# Patient Record
Sex: FEMALE | Race: BLACK OR AFRICAN AMERICAN | NOT HISPANIC OR LATINO | Employment: UNEMPLOYED | RURAL
[De-identification: names, ages, dates, MRNs, and addresses within clinical notes are randomized per-mention and may not be internally consistent; named-entity substitution may affect disease eponyms.]

---

## 2017-05-15 ENCOUNTER — HISTORICAL (OUTPATIENT)
Dept: ADMINISTRATIVE | Facility: HOSPITAL | Age: 47
End: 2017-05-15

## 2017-05-16 LAB
LAB AP CLINICAL INFORMATION: NORMAL
LAB AP DIAGNOSIS - HISTORICAL: NORMAL
LAB AP GROSS PATHOLOGY - HISTORICAL: NORMAL
LAB AP SPECIMEN SUBMITTED - HISTORICAL: NORMAL

## 2017-06-02 ENCOUNTER — HISTORICAL (OUTPATIENT)
Dept: ADMINISTRATIVE | Facility: HOSPITAL | Age: 47
End: 2017-06-02

## 2020-09-28 ENCOUNTER — HISTORICAL (OUTPATIENT)
Dept: ADMINISTRATIVE | Facility: HOSPITAL | Age: 50
End: 2020-09-28

## 2020-10-22 ENCOUNTER — HISTORICAL (OUTPATIENT)
Dept: ADMINISTRATIVE | Facility: HOSPITAL | Age: 50
End: 2020-10-22

## 2020-10-22 LAB
ALBUMIN SERPL BCP-MCNC: 3.9 G/DL (ref 3.5–5)
ALBUMIN/GLOB SERPL: 1.1 {RATIO}
ALP SERPL-CCNC: 159 U/L (ref 39–100)
ALT SERPL W P-5'-P-CCNC: 14 U/L (ref 13–56)
ANION GAP SERPL CALCULATED.3IONS-SCNC: 7 MMOL/L (ref 7–16)
AST SERPL W P-5'-P-CCNC: 8 U/L (ref 15–37)
BACTERIA #/AREA URNS HPF: ABNORMAL /HPF
BILIRUB SERPL-MCNC: 0.3 MG/DL (ref 0–1.2)
BILIRUB UR QL STRIP: NEGATIVE MG/DL
BUN SERPL-MCNC: 18 MG/DL (ref 7–18)
BUN/CREAT SERPL: 14
CALCIUM SERPL-MCNC: 9 MG/DL (ref 8.5–10.1)
CHLORIDE SERPL-SCNC: 113 MMOL/L (ref 98–107)
CHOLEST SERPL-MCNC: 155 MG/DL
CHOLEST/HDLC SERPL: 2.9 {RATIO}
CLARITY UR: CLEAR
CO2 SERPL-SCNC: 28 MMOL/L (ref 21–32)
COLOR UR: YELLOW
CREAT SERPL-MCNC: 1.29 MG/DL (ref 0.5–1.02)
CREAT UR-MCNC: 73 MG/DL (ref 28–217)
GLOBULIN SER-MCNC: 3.6 G/DL (ref 2–4)
GLUCOSE SERPL-MCNC: 130 MG/DL (ref 74–106)
GLUCOSE UR STRIP-MCNC: NEGATIVE MG/DL
HDLC SERPL-MCNC: 53 MG/DL
KETONES UR STRIP-SCNC: NEGATIVE MG/DL
LDLC SERPL CALC-MCNC: 85 MG/DL
LEUKOCYTE ESTERASE UR QL STRIP: NEGATIVE LEU/UL
MICROALBUMIN UR-MCNC: 0.8 MG/DL (ref 0–2.8)
MICROALBUMIN/CREAT RATIO PNL UR: <30 MG/G (ref 0–30)
NITRITE UR QL STRIP: NEGATIVE
PH UR STRIP: 6 PH UNITS (ref 5–8)
POTASSIUM SERPL-SCNC: 4.1 MMOL/L (ref 3.5–5.1)
PROT SERPL-MCNC: 7.5 G/DL (ref 6.4–8.2)
PROT UR QL STRIP: NEGATIVE MG/DL
RBC # UR STRIP: NEGATIVE ERY/UL
RBC #/AREA URNS HPF: ABNORMAL /HPF (ref 0–3)
SODIUM SERPL-SCNC: 144 MMOL/L (ref 136–145)
SP GR UR STRIP: 1.01 (ref 1–1.03)
SQUAMOUS #/AREA URNS LPF: ABNORMAL /LPF
TRIGL SERPL-MCNC: 83 MG/DL
UROBILINOGEN UR STRIP-ACNC: 0.2 EU/DL
WBC #/AREA URNS HPF: ABNORMAL /HPF (ref 0–5)
YEAST #/AREA URNS HPF: ABNORMAL /HPF

## 2021-04-21 RX ORDER — INSULIN ASPART 100 [IU]/ML
INJECTION, SOLUTION INTRAVENOUS; SUBCUTANEOUS
COMMUNITY
End: 2021-04-22 | Stop reason: SDUPTHER

## 2021-04-21 RX ORDER — FLUTICASONE PROPIONATE 50 MCG
2 SPRAY, SUSPENSION (ML) NASAL DAILY
COMMUNITY

## 2021-04-21 RX ORDER — AMLODIPINE AND BENAZEPRIL HYDROCHLORIDE 5; 20 MG/1; MG/1
1 CAPSULE ORAL DAILY
COMMUNITY
End: 2021-07-22

## 2021-04-21 RX ORDER — PANTOPRAZOLE SODIUM 40 MG/1
80 TABLET, DELAYED RELEASE ORAL DAILY
COMMUNITY
End: 2022-11-01 | Stop reason: SDUPTHER

## 2021-04-21 RX ORDER — IBUPROFEN 200 MG
CAPSULE ORAL
COMMUNITY
End: 2021-07-22

## 2021-04-21 RX ORDER — PROMETHAZINE HYDROCHLORIDE 25 MG/1
25 TABLET ORAL EVERY 4 HOURS
COMMUNITY
End: 2023-01-25 | Stop reason: SDUPTHER

## 2021-04-21 RX ORDER — GALCANEZUMAB 120 MG/ML
120 INJECTION, SOLUTION SUBCUTANEOUS
COMMUNITY
End: 2021-05-17 | Stop reason: SDUPTHER

## 2021-04-21 RX ORDER — DIVALPROEX SODIUM 250 MG/1
500 TABLET, FILM COATED, EXTENDED RELEASE ORAL NIGHTLY
COMMUNITY
End: 2021-05-17

## 2021-04-21 RX ORDER — RIZATRIPTAN BENZOATE 10 MG/1
10 TABLET ORAL
COMMUNITY
End: 2021-05-17

## 2021-04-21 RX ORDER — HYDROXYZINE PAMOATE 25 MG/1
25 CAPSULE ORAL 3 TIMES DAILY PRN
COMMUNITY
End: 2023-02-21

## 2021-04-21 RX ORDER — INSULIN GLARGINE 100 [IU]/ML
65 INJECTION, SOLUTION SUBCUTANEOUS DAILY
COMMUNITY
End: 2021-04-22 | Stop reason: SDUPTHER

## 2021-04-21 RX ORDER — BLOOD SUGAR DIAGNOSTIC
STRIP MISCELLANEOUS
COMMUNITY
End: 2021-04-22 | Stop reason: SDUPTHER

## 2021-04-21 RX ORDER — GLUCAGON 1 MG
KIT INJECTION
COMMUNITY
End: 2023-06-13 | Stop reason: SDUPTHER

## 2021-04-21 RX ORDER — PRAVASTATIN SODIUM 40 MG/1
40 TABLET ORAL NIGHTLY
COMMUNITY
End: 2022-04-28 | Stop reason: SDUPTHER

## 2021-04-21 RX ORDER — ZINC GLUCONATE 13.3 MG
200 LOZENGE ORAL DAILY PRN
COMMUNITY
End: 2022-01-25

## 2021-04-21 RX ORDER — METOPROLOL SUCCINATE 25 MG/1
25 TABLET, EXTENDED RELEASE ORAL DAILY
COMMUNITY
End: 2022-05-16 | Stop reason: SDUPTHER

## 2021-04-22 ENCOUNTER — LAB VISIT (OUTPATIENT)
Dept: LAB | Facility: CLINIC | Age: 51
End: 2021-04-22
Payer: OTHER GOVERNMENT

## 2021-04-22 ENCOUNTER — OFFICE VISIT (OUTPATIENT)
Dept: DIABETES SERVICES | Facility: CLINIC | Age: 51
End: 2021-04-22
Payer: OTHER GOVERNMENT

## 2021-04-22 VITALS
RESPIRATION RATE: 16 BRPM | OXYGEN SATURATION: 98 % | WEIGHT: 207.19 LBS | BODY MASS INDEX: 47.95 KG/M2 | HEART RATE: 69 BPM | DIASTOLIC BLOOD PRESSURE: 68 MMHG | HEIGHT: 55 IN | SYSTOLIC BLOOD PRESSURE: 112 MMHG

## 2021-04-22 DIAGNOSIS — G43.009 MIGRAINE WITHOUT AURA AND WITHOUT STATUS MIGRAINOSUS, NOT INTRACTABLE: ICD-10-CM

## 2021-04-22 DIAGNOSIS — E10.65 TYPE 1 DIABETES MELLITUS WITH HYPERGLYCEMIA: ICD-10-CM

## 2021-04-22 DIAGNOSIS — E10.65 TYPE 1 DIABETES MELLITUS WITH HYPERGLYCEMIA: Primary | ICD-10-CM

## 2021-04-22 DIAGNOSIS — I10 HYPERTENSION, UNSPECIFIED TYPE: ICD-10-CM

## 2021-04-22 LAB
ALBUMIN SERPL BCP-MCNC: 3.6 G/DL (ref 3.5–5)
ALBUMIN/GLOB SERPL: 1 {RATIO}
ALP SERPL-CCNC: 155 U/L (ref 39–100)
ALT SERPL W P-5'-P-CCNC: 18 U/L (ref 13–56)
ANION GAP SERPL CALCULATED.3IONS-SCNC: 9 MMOL/L (ref 7–16)
AST SERPL W P-5'-P-CCNC: 9 U/L (ref 15–37)
BILIRUB SERPL-MCNC: 0.3 MG/DL (ref 0–1.2)
BUN SERPL-MCNC: 22 MG/DL (ref 7–18)
BUN/CREAT SERPL: 14 (ref 6–20)
CALCIUM SERPL-MCNC: 9 MG/DL (ref 8.5–10.1)
CHLORIDE SERPL-SCNC: 107 MMOL/L (ref 98–107)
CHOLEST SERPL-MCNC: 170 MG/DL (ref 0–200)
CHOLEST/HDLC SERPL: 4 {RATIO}
CO2 SERPL-SCNC: 28 MMOL/L (ref 21–32)
CREAT SERPL-MCNC: 1.57 MG/DL (ref 0.55–1.02)
GLOBULIN SER-MCNC: 3.7 G/DL (ref 2–4)
GLUCOSE SERPL-MCNC: 197 MG/DL (ref 74–106)
GLUCOSE SERPL-MCNC: 207 MG/DL (ref 70–110)
HBA1C MFR BLD: 7.9 %
HDLC SERPL-MCNC: 43 MG/DL (ref 40–60)
LDLC SERPL CALC-MCNC: 96 MG/DL
LDLC/HDLC SERPL: 2.2 {RATIO}
NONHDLC SERPL-MCNC: 127 MG/DL
POTASSIUM SERPL-SCNC: 4.9 MMOL/L (ref 3.5–5.1)
PROT SERPL-MCNC: 7.3 G/DL (ref 6.4–8.2)
SODIUM SERPL-SCNC: 139 MMOL/L (ref 136–145)
TRIGL SERPL-MCNC: 157 MG/DL (ref 35–150)
VLDLC SERPL-MCNC: 31 MG/DL

## 2021-04-22 PROCEDURE — 80053 COMPREHEN METABOLIC PANEL: CPT | Mod: ,,, | Performed by: CLINICAL MEDICAL LABORATORY

## 2021-04-22 PROCEDURE — 99999 PR PBB SHADOW E&M-EST. PATIENT-LVL V: ICD-10-PCS | Mod: PBBFAC,,, | Performed by: NURSE PRACTITIONER

## 2021-04-22 PROCEDURE — 99214 OFFICE O/P EST MOD 30 MIN: CPT | Mod: S$PBB,,, | Performed by: NURSE PRACTITIONER

## 2021-04-22 PROCEDURE — 82962 GLUCOSE BLOOD TEST: CPT | Mod: PBBFAC | Performed by: NURSE PRACTITIONER

## 2021-04-22 PROCEDURE — 83036 HEMOGLOBIN GLYCOSYLATED A1C: CPT | Mod: PBBFAC | Performed by: NURSE PRACTITIONER

## 2021-04-22 PROCEDURE — 80053 COMPREHENSIVE METABOLIC PANEL: ICD-10-PCS | Mod: ,,, | Performed by: CLINICAL MEDICAL LABORATORY

## 2021-04-22 PROCEDURE — 99999 PR PBB SHADOW E&M-EST. PATIENT-LVL V: CPT | Mod: PBBFAC,,, | Performed by: NURSE PRACTITIONER

## 2021-04-22 PROCEDURE — 99214 PR OFFICE/OUTPT VISIT, EST, LEVL IV, 30-39 MIN: ICD-10-PCS | Mod: S$PBB,,, | Performed by: NURSE PRACTITIONER

## 2021-04-22 PROCEDURE — 80061 LIPID PANEL: CPT | Mod: ,,, | Performed by: CLINICAL MEDICAL LABORATORY

## 2021-04-22 PROCEDURE — 36415 PR COLLECTION VENOUS BLOOD,VENIPUNCTURE: ICD-10-PCS | Mod: ,,, | Performed by: CLINICAL MEDICAL LABORATORY

## 2021-04-22 PROCEDURE — 36415 COLL VENOUS BLD VENIPUNCTURE: CPT

## 2021-04-22 PROCEDURE — 80061 LIPID PANEL: ICD-10-PCS | Mod: ,,, | Performed by: CLINICAL MEDICAL LABORATORY

## 2021-04-22 PROCEDURE — 36415 COLL VENOUS BLD VENIPUNCTURE: CPT | Mod: ,,, | Performed by: CLINICAL MEDICAL LABORATORY

## 2021-04-22 PROCEDURE — 99215 OFFICE O/P EST HI 40 MIN: CPT | Mod: PBBFAC | Performed by: NURSE PRACTITIONER

## 2021-04-22 RX ORDER — ACETAMINOPHEN 500 MG
5000 TABLET ORAL DAILY
COMMUNITY

## 2021-04-22 RX ORDER — BLOOD SUGAR DIAGNOSTIC
STRIP MISCELLANEOUS
Qty: 500 EACH | Refills: 3 | Status: SHIPPED | OUTPATIENT
Start: 2021-04-22 | End: 2021-07-22

## 2021-04-22 RX ORDER — BLOOD-GLUCOSE CONTROL, NORMAL
EACH MISCELLANEOUS
Qty: 40 EACH | Refills: 3 | Status: SHIPPED | OUTPATIENT
Start: 2021-04-22 | End: 2021-07-22 | Stop reason: SDUPTHER

## 2021-04-22 RX ORDER — MAGNESIUM GLUCONATE 27 MG(500)
27 TABLET ORAL NIGHTLY
COMMUNITY
End: 2022-08-10

## 2021-04-22 RX ORDER — METOCLOPRAMIDE 10 MG/1
10 TABLET ORAL EVERY 6 HOURS PRN
COMMUNITY
End: 2023-01-25

## 2021-04-22 RX ORDER — BUPROPION HYDROCHLORIDE 150 MG/1
150 TABLET ORAL DAILY
COMMUNITY
End: 2022-01-25

## 2021-04-22 RX ORDER — INSULIN ASPART 100 [IU]/ML
INJECTION, SOLUTION INTRAVENOUS; SUBCUTANEOUS
Qty: 45 ML | Refills: 1 | Status: SHIPPED | OUTPATIENT
Start: 2021-04-22 | End: 2021-07-22 | Stop reason: SDUPTHER

## 2021-04-22 RX ORDER — INSULIN GLARGINE 100 [IU]/ML
45 INJECTION, SOLUTION SUBCUTANEOUS 2 TIMES DAILY
Qty: 90 ML | Refills: 3 | Status: SHIPPED | OUTPATIENT
Start: 2021-04-22 | End: 2021-07-22 | Stop reason: SDUPTHER

## 2021-04-22 RX ORDER — AMLODIPINE AND BENAZEPRIL HYDROCHLORIDE 5; 40 MG/1; MG/1
1 CAPSULE ORAL DAILY
COMMUNITY
End: 2021-05-17 | Stop reason: SDUPTHER

## 2021-05-05 ENCOUNTER — HISTORICAL (OUTPATIENT)
Dept: ADMINISTRATIVE | Facility: HOSPITAL | Age: 51
End: 2021-05-05

## 2021-05-17 ENCOUNTER — OFFICE VISIT (OUTPATIENT)
Dept: OTOLARYNGOLOGY | Facility: CLINIC | Age: 51
End: 2021-05-17
Payer: OTHER GOVERNMENT

## 2021-05-17 ENCOUNTER — OFFICE VISIT (OUTPATIENT)
Dept: NEUROLOGY | Facility: CLINIC | Age: 51
End: 2021-05-17
Payer: OTHER GOVERNMENT

## 2021-05-17 VITALS
HEIGHT: 66 IN | SYSTOLIC BLOOD PRESSURE: 164 MMHG | BODY MASS INDEX: 33.54 KG/M2 | DIASTOLIC BLOOD PRESSURE: 60 MMHG | WEIGHT: 208.69 LBS | HEART RATE: 79 BPM | OXYGEN SATURATION: 97 %

## 2021-05-17 VITALS — HEIGHT: 55 IN | WEIGHT: 207 LBS | BODY MASS INDEX: 47.9 KG/M2

## 2021-05-17 DIAGNOSIS — R06.2 WHEEZING: Primary | ICD-10-CM

## 2021-05-17 DIAGNOSIS — J32.9 CHRONIC SINUSITIS, UNSPECIFIED LOCATION: Primary | ICD-10-CM

## 2021-05-17 DIAGNOSIS — H90.12 CONDUCTIVE HEARING LOSS OF LEFT EAR, UNSPECIFIED HEARING STATUS ON CONTRALATERAL SIDE: ICD-10-CM

## 2021-05-17 DIAGNOSIS — E55.9 VITAMIN D DEFICIENCY: ICD-10-CM

## 2021-05-17 DIAGNOSIS — G43.019 INTRACTABLE MIGRAINE WITHOUT AURA AND WITHOUT STATUS MIGRAINOSUS: Primary | ICD-10-CM

## 2021-05-17 DIAGNOSIS — H66.92 LEFT OTITIS MEDIA, UNSPECIFIED OTITIS MEDIA TYPE: ICD-10-CM

## 2021-05-17 PROBLEM — E66.9 OBESITY: Status: ACTIVE | Noted: 2021-04-13

## 2021-05-17 PROBLEM — N18.2 STAGE 2 CHRONIC KIDNEY DISEASE DUE TO TYPE 2 DIABETES MELLITUS: Status: ACTIVE | Noted: 2021-04-13

## 2021-05-17 PROBLEM — G47.20 SLEEP PATTERN DISTURBANCE: Status: ACTIVE | Noted: 2021-04-13

## 2021-05-17 PROBLEM — M26.609 TEMPOROMANDIBULAR JOINT DISORDER: Status: ACTIVE | Noted: 2021-04-13

## 2021-05-17 PROBLEM — H93.13 BILATERAL TINNITUS: Status: ACTIVE | Noted: 2021-04-13

## 2021-05-17 PROBLEM — E11.22 STAGE 2 CHRONIC KIDNEY DISEASE DUE TO TYPE 2 DIABETES MELLITUS: Status: ACTIVE | Noted: 2021-04-13

## 2021-05-17 PROBLEM — G43.709 CHRONIC MIGRAINE WITHOUT AURA: Status: ACTIVE | Noted: 2021-04-12

## 2021-05-17 PROBLEM — M54.2 NECK PAIN: Status: ACTIVE | Noted: 2021-04-13

## 2021-05-17 PROCEDURE — 99215 OFFICE O/P EST HI 40 MIN: CPT | Mod: PBBFAC | Performed by: NURSE PRACTITIONER

## 2021-05-17 PROCEDURE — 99203 PR OFFICE/OUTPT VISIT, NEW, LEVL III, 30-44 MIN: ICD-10-PCS | Mod: S$PBB,,, | Performed by: OTOLARYNGOLOGY

## 2021-05-17 PROCEDURE — 99213 OFFICE O/P EST LOW 20 MIN: CPT | Mod: S$PBB,,, | Performed by: NURSE PRACTITIONER

## 2021-05-17 PROCEDURE — 99215 OFFICE O/P EST HI 40 MIN: CPT | Mod: PBBFAC | Performed by: OTOLARYNGOLOGY

## 2021-05-17 PROCEDURE — 99213 PR OFFICE/OUTPT VISIT, EST, LEVL III, 20-29 MIN: ICD-10-PCS | Mod: S$PBB,,, | Performed by: NURSE PRACTITIONER

## 2021-05-17 PROCEDURE — 99203 OFFICE O/P NEW LOW 30 MIN: CPT | Mod: S$PBB,,, | Performed by: OTOLARYNGOLOGY

## 2021-05-17 RX ORDER — ONDANSETRON 8 MG/1
TABLET, ORALLY DISINTEGRATING ORAL
COMMUNITY
End: 2023-01-25

## 2021-05-17 RX ORDER — UBROGEPANT 100 MG/1
TABLET ORAL
COMMUNITY
Start: 2021-05-10 | End: 2023-01-25

## 2021-05-17 RX ORDER — AMLODIPINE AND BENAZEPRIL HYDROCHLORIDE 5; 40 MG/1; MG/1
CAPSULE ORAL
COMMUNITY
End: 2022-04-28 | Stop reason: SDUPTHER

## 2021-05-17 RX ORDER — TOPIRAMATE 50 MG/1
TABLET, FILM COATED ORAL
COMMUNITY
End: 2021-05-17

## 2021-05-17 RX ORDER — GALCANEZUMAB 120 MG/ML
INJECTION, SOLUTION SUBCUTANEOUS
Qty: 1 ML | Refills: 11 | Status: SHIPPED | OUTPATIENT
Start: 2021-05-17 | End: 2022-02-08

## 2021-05-17 RX ORDER — DORZOLAMIDE HYDROCHLORIDE AND TIMOLOL MALEATE 20; 5 MG/ML; MG/ML
SOLUTION/ DROPS OPHTHALMIC
COMMUNITY
Start: 2021-03-30

## 2021-05-17 RX ORDER — PEN NEEDLE, DIABETIC 31 GX5/16"
NEEDLE, DISPOSABLE MISCELLANEOUS
COMMUNITY
Start: 2021-04-30 | End: 2021-07-22

## 2021-05-17 RX ORDER — BUPROPION HYDROCHLORIDE 150 MG/1
TABLET, EXTENDED RELEASE ORAL
COMMUNITY
End: 2021-05-17 | Stop reason: SDUPTHER

## 2021-05-17 RX ORDER — ACETAMINOPHEN 500 MG
5000 TABLET ORAL
COMMUNITY
End: 2021-05-17 | Stop reason: SDUPTHER

## 2021-05-26 DIAGNOSIS — J32.9 CHRONIC SINUSITIS, UNSPECIFIED LOCATION: ICD-10-CM

## 2021-06-07 ENCOUNTER — OFFICE VISIT (OUTPATIENT)
Dept: OTOLARYNGOLOGY | Facility: CLINIC | Age: 51
End: 2021-06-07
Payer: OTHER GOVERNMENT

## 2021-06-07 ENCOUNTER — HOSPITAL ENCOUNTER (OUTPATIENT)
Dept: RADIOLOGY | Facility: HOSPITAL | Age: 51
Discharge: HOME | End: 2021-06-07
Attending: OTOLARYNGOLOGY
Payer: COMMERCIAL

## 2021-06-07 VITALS — HEIGHT: 66 IN | BODY MASS INDEX: 33.54 KG/M2 | WEIGHT: 208.69 LBS

## 2021-06-07 DIAGNOSIS — H92.02 LEFT EAR PAIN: ICD-10-CM

## 2021-06-07 DIAGNOSIS — J32.9 CHRONIC SINUSITIS, UNSPECIFIED LOCATION: ICD-10-CM

## 2021-06-07 DIAGNOSIS — H66.92 LEFT OTITIS MEDIA, UNSPECIFIED OTITIS MEDIA TYPE: Primary | ICD-10-CM

## 2021-06-07 PROCEDURE — 70486 CT MAXILLOFACIAL W/O DYE: CPT | Mod: TC

## 2021-06-07 PROCEDURE — 70486 CT SINUSES WITHOUT CONTRAST: ICD-10-PCS | Mod: 26,,, | Performed by: RADIOLOGY

## 2021-06-07 PROCEDURE — 99215 OFFICE O/P EST HI 40 MIN: CPT | Mod: PBBFAC,25 | Performed by: OTOLARYNGOLOGY

## 2021-06-07 PROCEDURE — 70486 CT MAXILLOFACIAL W/O DYE: CPT | Mod: 26,,, | Performed by: RADIOLOGY

## 2021-06-07 PROCEDURE — 99214 OFFICE O/P EST MOD 30 MIN: CPT | Mod: S$PBB,,, | Performed by: OTOLARYNGOLOGY

## 2021-06-07 PROCEDURE — 99214 PR OFFICE/OUTPT VISIT, EST, LEVL IV, 30-39 MIN: ICD-10-PCS | Mod: S$PBB,,, | Performed by: OTOLARYNGOLOGY

## 2021-06-07 RX ORDER — CLINDAMYCIN HYDROCHLORIDE 300 MG/1
300 CAPSULE ORAL 2 TIMES DAILY
Qty: 28 CAPSULE | Refills: 0 | Status: SHIPPED | OUTPATIENT
Start: 2021-06-07 | End: 2022-01-13

## 2021-06-17 DIAGNOSIS — J32.9 CHRONIC SINUSITIS, UNSPECIFIED LOCATION: Primary | ICD-10-CM

## 2021-06-17 RX ORDER — SULFAMETHOXAZOLE AND TRIMETHOPRIM 800; 160 MG/1; MG/1
1 TABLET ORAL 2 TIMES DAILY
Qty: 20 TABLET | Refills: 0 | Status: SHIPPED | OUTPATIENT
Start: 2021-06-17 | End: 2022-01-13 | Stop reason: ALTCHOICE

## 2021-06-22 ENCOUNTER — OFFICE VISIT (OUTPATIENT)
Dept: OTOLARYNGOLOGY | Facility: CLINIC | Age: 51
End: 2021-06-22
Payer: OTHER GOVERNMENT

## 2021-06-22 ENCOUNTER — OFFICE VISIT (OUTPATIENT)
Dept: PULMONOLOGY | Facility: CLINIC | Age: 51
End: 2021-06-22
Payer: COMMERCIAL

## 2021-06-22 VITALS
HEART RATE: 77 BPM | SYSTOLIC BLOOD PRESSURE: 122 MMHG | RESPIRATION RATE: 18 BRPM | BODY MASS INDEX: 34.23 KG/M2 | WEIGHT: 213 LBS | OXYGEN SATURATION: 98 % | DIASTOLIC BLOOD PRESSURE: 62 MMHG | HEIGHT: 66 IN

## 2021-06-22 VITALS — HEIGHT: 66 IN | WEIGHT: 213 LBS | BODY MASS INDEX: 34.23 KG/M2

## 2021-06-22 DIAGNOSIS — R06.2 WHEEZING: Primary | ICD-10-CM

## 2021-06-22 DIAGNOSIS — H92.02 LEFT EAR PAIN: ICD-10-CM

## 2021-06-22 DIAGNOSIS — H66.92 LEFT OTITIS MEDIA, UNSPECIFIED OTITIS MEDIA TYPE: Primary | ICD-10-CM

## 2021-06-22 PROCEDURE — 99213 PR OFFICE/OUTPT VISIT, EST, LEVL III, 20-29 MIN: ICD-10-PCS | Mod: S$PBB,,, | Performed by: INTERNAL MEDICINE

## 2021-06-22 PROCEDURE — 99213 OFFICE O/P EST LOW 20 MIN: CPT | Mod: PBBFAC | Performed by: OTOLARYNGOLOGY

## 2021-06-22 PROCEDURE — 99214 OFFICE O/P EST MOD 30 MIN: CPT | Mod: S$PBB,,, | Performed by: OTOLARYNGOLOGY

## 2021-06-22 PROCEDURE — 99214 PR OFFICE/OUTPT VISIT, EST, LEVL IV, 30-39 MIN: ICD-10-PCS | Mod: S$PBB,,, | Performed by: OTOLARYNGOLOGY

## 2021-06-22 PROCEDURE — 99215 OFFICE O/P EST HI 40 MIN: CPT | Mod: PBBFAC | Performed by: INTERNAL MEDICINE

## 2021-06-22 PROCEDURE — 99213 OFFICE O/P EST LOW 20 MIN: CPT | Mod: S$PBB,,, | Performed by: INTERNAL MEDICINE

## 2021-06-22 RX ORDER — ALBUTEROL SULFATE 90 UG/1
2 AEROSOL, METERED RESPIRATORY (INHALATION) EVERY 6 HOURS PRN
Qty: 18 G | Refills: 0 | Status: SHIPPED | OUTPATIENT
Start: 2021-06-22 | End: 2021-09-07 | Stop reason: SDUPTHER

## 2021-07-14 ENCOUNTER — OFFICE VISIT (OUTPATIENT)
Dept: OTOLARYNGOLOGY | Facility: CLINIC | Age: 51
End: 2021-07-14
Payer: OTHER GOVERNMENT

## 2021-07-14 VITALS — WEIGHT: 213 LBS | BODY MASS INDEX: 34.23 KG/M2 | HEIGHT: 66 IN

## 2021-07-14 DIAGNOSIS — H66.90 OTITIS MEDIA, UNSPECIFIED LATERALITY, UNSPECIFIED OTITIS MEDIA TYPE: Primary | ICD-10-CM

## 2021-07-14 PROCEDURE — 99214 OFFICE O/P EST MOD 30 MIN: CPT | Mod: S$PBB,,, | Performed by: OTOLARYNGOLOGY

## 2021-07-14 PROCEDURE — 99215 OFFICE O/P EST HI 40 MIN: CPT | Mod: PBBFAC | Performed by: OTOLARYNGOLOGY

## 2021-07-14 PROCEDURE — 99214 PR OFFICE/OUTPT VISIT, EST, LEVL IV, 30-39 MIN: ICD-10-PCS | Mod: S$PBB,,, | Performed by: OTOLARYNGOLOGY

## 2021-07-19 ENCOUNTER — OFFICE VISIT (OUTPATIENT)
Dept: FAMILY MEDICINE | Facility: CLINIC | Age: 51
End: 2021-07-19
Payer: OTHER GOVERNMENT

## 2021-07-19 VITALS
DIASTOLIC BLOOD PRESSURE: 70 MMHG | SYSTOLIC BLOOD PRESSURE: 127 MMHG | TEMPERATURE: 97 F | BODY MASS INDEX: 32.14 KG/M2 | OXYGEN SATURATION: 99 % | WEIGHT: 200 LBS | HEIGHT: 66 IN | HEART RATE: 71 BPM

## 2021-07-19 DIAGNOSIS — E10.9 TYPE 1 DIABETES MELLITUS WITHOUT COMPLICATION: Primary | ICD-10-CM

## 2021-07-19 DIAGNOSIS — E01.0 THYROMEGALY: ICD-10-CM

## 2021-07-19 LAB
GLUCOSE SERPL-MCNC: 235 MG/DL (ref 70–110)
T4 FREE SERPL-MCNC: 1.11 NG/DL (ref 0.76–1.46)
TSH SERPL DL<=0.005 MIU/L-ACNC: 2.26 UIU/ML (ref 0.36–3.74)

## 2021-07-19 PROCEDURE — 84443 ASSAY THYROID STIM HORMONE: CPT | Mod: ,,, | Performed by: CLINICAL MEDICAL LABORATORY

## 2021-07-19 PROCEDURE — 84439 ASSAY OF FREE THYROXINE: CPT | Mod: ,,, | Performed by: CLINICAL MEDICAL LABORATORY

## 2021-07-19 PROCEDURE — 84439 T4, FREE: ICD-10-PCS | Mod: ,,, | Performed by: CLINICAL MEDICAL LABORATORY

## 2021-07-19 PROCEDURE — 99213 PR OFFICE/OUTPT VISIT, EST, LEVL III, 20-29 MIN: ICD-10-PCS | Mod: ,,, | Performed by: NURSE PRACTITIONER

## 2021-07-19 PROCEDURE — 82962 POCT GLUCOSE, HAND-HELD DEVICE: ICD-10-PCS | Mod: QW,,, | Performed by: NURSE PRACTITIONER

## 2021-07-19 PROCEDURE — 99213 OFFICE O/P EST LOW 20 MIN: CPT | Mod: ,,, | Performed by: NURSE PRACTITIONER

## 2021-07-19 PROCEDURE — 84443 TSH: ICD-10-PCS | Mod: ,,, | Performed by: CLINICAL MEDICAL LABORATORY

## 2021-07-19 PROCEDURE — 82962 GLUCOSE BLOOD TEST: CPT | Mod: QW,,, | Performed by: NURSE PRACTITIONER

## 2021-07-20 ENCOUNTER — TELEPHONE (OUTPATIENT)
Dept: FAMILY MEDICINE | Facility: CLINIC | Age: 51
End: 2021-07-20

## 2021-07-22 ENCOUNTER — PATIENT MESSAGE (OUTPATIENT)
Dept: DIABETES SERVICES | Facility: CLINIC | Age: 51
End: 2021-07-22

## 2021-07-22 ENCOUNTER — OFFICE VISIT (OUTPATIENT)
Dept: DIABETES SERVICES | Facility: CLINIC | Age: 51
End: 2021-07-22
Payer: OTHER GOVERNMENT

## 2021-07-22 VITALS
SYSTOLIC BLOOD PRESSURE: 122 MMHG | HEART RATE: 73 BPM | OXYGEN SATURATION: 98 % | RESPIRATION RATE: 16 BRPM | DIASTOLIC BLOOD PRESSURE: 78 MMHG | WEIGHT: 210.38 LBS | BODY MASS INDEX: 33.81 KG/M2 | HEIGHT: 66 IN

## 2021-07-22 DIAGNOSIS — E11.22 STAGE 2 CHRONIC KIDNEY DISEASE DUE TO TYPE 2 DIABETES MELLITUS: ICD-10-CM

## 2021-07-22 DIAGNOSIS — E10.9 TYPE 1 DIABETES MELLITUS WITHOUT COMPLICATION: Primary | ICD-10-CM

## 2021-07-22 DIAGNOSIS — E66.9 OBESITY, UNSPECIFIED CLASSIFICATION, UNSPECIFIED OBESITY TYPE, UNSPECIFIED WHETHER SERIOUS COMORBIDITY PRESENT: ICD-10-CM

## 2021-07-22 DIAGNOSIS — E10.65 TYPE 1 DIABETES MELLITUS WITH HYPERGLYCEMIA: ICD-10-CM

## 2021-07-22 DIAGNOSIS — N18.2 STAGE 2 CHRONIC KIDNEY DISEASE DUE TO TYPE 2 DIABETES MELLITUS: ICD-10-CM

## 2021-07-22 DIAGNOSIS — G43.709 CHRONIC MIGRAINE WITHOUT AURA WITHOUT STATUS MIGRAINOSUS, NOT INTRACTABLE: ICD-10-CM

## 2021-07-22 DIAGNOSIS — I10 HYPERTENSION, UNSPECIFIED TYPE: ICD-10-CM

## 2021-07-22 LAB
GLUCOSE SERPL-MCNC: 161 MG/DL (ref 70–110)
HBA1C MFR BLD: 8 % (ref 4.5–6.6)

## 2021-07-22 PROCEDURE — 99215 OFFICE O/P EST HI 40 MIN: CPT | Mod: PBBFAC | Performed by: NURSE PRACTITIONER

## 2021-07-22 PROCEDURE — 99214 OFFICE O/P EST MOD 30 MIN: CPT | Mod: S$PBB,,, | Performed by: NURSE PRACTITIONER

## 2021-07-22 PROCEDURE — 83036 HEMOGLOBIN GLYCOSYLATED A1C: CPT | Mod: PBBFAC | Performed by: NURSE PRACTITIONER

## 2021-07-22 PROCEDURE — 99214 PR OFFICE/OUTPT VISIT, EST, LEVL IV, 30-39 MIN: ICD-10-PCS | Mod: S$PBB,,, | Performed by: NURSE PRACTITIONER

## 2021-07-22 PROCEDURE — 82962 GLUCOSE BLOOD TEST: CPT | Mod: PBBFAC | Performed by: NURSE PRACTITIONER

## 2021-07-22 RX ORDER — FLASH GLUCOSE SENSOR
KIT MISCELLANEOUS
Qty: 6 KIT | Refills: 1 | Status: SHIPPED | OUTPATIENT
Start: 2021-07-22 | End: 2021-11-01 | Stop reason: SDUPTHER

## 2021-07-22 RX ORDER — PEN NEEDLE, DIABETIC 31 GX5/16"
NEEDLE, DISPOSABLE MISCELLANEOUS
Qty: 100 EACH | Refills: 11 | Status: SHIPPED | OUTPATIENT
Start: 2021-07-22 | End: 2022-04-28 | Stop reason: SDUPTHER

## 2021-07-22 RX ORDER — FLASH GLUCOSE SCANNING READER
EACH MISCELLANEOUS
Qty: 1 EACH | Refills: 0 | Status: SHIPPED | OUTPATIENT
Start: 2021-07-22 | End: 2022-01-25 | Stop reason: SDUPTHER

## 2021-07-22 RX ORDER — INSULIN ASPART 100 [IU]/ML
INJECTION, SOLUTION INTRAVENOUS; SUBCUTANEOUS
Qty: 45 ML | Refills: 1 | Status: SHIPPED | OUTPATIENT
Start: 2021-07-22 | End: 2021-11-15 | Stop reason: SDUPTHER

## 2021-07-22 RX ORDER — BLOOD-GLUCOSE CONTROL, NORMAL
EACH MISCELLANEOUS
Qty: 40 EACH | Refills: 3 | Status: SHIPPED | OUTPATIENT
Start: 2021-07-22 | End: 2022-02-08

## 2021-07-22 RX ORDER — INSULIN GLARGINE 100 [IU]/ML
45 INJECTION, SOLUTION SUBCUTANEOUS 2 TIMES DAILY
Qty: 90 ML | Refills: 3 | Status: SHIPPED | OUTPATIENT
Start: 2021-07-22 | End: 2021-11-01 | Stop reason: SDUPTHER

## 2021-07-26 ENCOUNTER — OFFICE VISIT (OUTPATIENT)
Dept: FAMILY MEDICINE | Facility: CLINIC | Age: 51
End: 2021-07-26
Payer: OTHER GOVERNMENT

## 2021-07-26 DIAGNOSIS — Z01.812 ENCOUNTER FOR PREOPERATIVE SCREENING LABORATORY TESTING FOR COVID-19 VIRUS: Primary | ICD-10-CM

## 2021-07-26 DIAGNOSIS — Z11.52 ENCOUNTER FOR PREOPERATIVE SCREENING LABORATORY TESTING FOR COVID-19 VIRUS: Primary | ICD-10-CM

## 2021-07-26 LAB
CTP QC/QA: YES
SARS-COV-2 AG RESP QL IA.RAPID: NEGATIVE

## 2021-07-26 PROCEDURE — 87426 SARSCOV CORONAVIRUS AG IA: CPT | Mod: QW,,, | Performed by: NURSE PRACTITIONER

## 2021-07-26 PROCEDURE — 87426 SARS CORONAVIRUS 2 ANTIGEN POCT: ICD-10-PCS | Mod: QW,,, | Performed by: NURSE PRACTITIONER

## 2021-07-27 ENCOUNTER — ANESTHESIA EVENT (OUTPATIENT)
Dept: SURGERY | Facility: HOSPITAL | Age: 51
End: 2021-07-27
Payer: COMMERCIAL

## 2021-07-27 ENCOUNTER — HOSPITAL ENCOUNTER (OUTPATIENT)
Facility: HOSPITAL | Age: 51
Discharge: HOME | End: 2021-07-27
Attending: OTOLARYNGOLOGY | Admitting: OTOLARYNGOLOGY
Payer: OTHER GOVERNMENT

## 2021-07-27 ENCOUNTER — ANESTHESIA (OUTPATIENT)
Dept: SURGERY | Facility: HOSPITAL | Age: 51
End: 2021-07-27
Payer: COMMERCIAL

## 2021-07-27 VITALS
DIASTOLIC BLOOD PRESSURE: 71 MMHG | HEIGHT: 66 IN | TEMPERATURE: 98 F | WEIGHT: 213 LBS | OXYGEN SATURATION: 97 % | SYSTOLIC BLOOD PRESSURE: 154 MMHG | BODY MASS INDEX: 34.23 KG/M2 | HEART RATE: 73 BPM | RESPIRATION RATE: 16 BRPM

## 2021-07-27 DIAGNOSIS — H65.23 CHRONIC SEROUS OTITIS MEDIA OF BOTH EARS: Primary | ICD-10-CM

## 2021-07-27 LAB
GLUCOSE SERPL-MCNC: 196 MG/DL (ref 70–105)
GLUCOSE SERPL-MCNC: 236 MG/DL (ref 70–105)

## 2021-07-27 PROCEDURE — D9220A PRA ANESTHESIA: ICD-10-PCS | Mod: CRNA,,, | Performed by: NURSE ANESTHETIST, CERTIFIED REGISTERED

## 2021-07-27 PROCEDURE — 69436 PR CREATE EARDRUM OPENING,GEN ANESTH: ICD-10-PCS | Mod: 50,,, | Performed by: OTOLARYNGOLOGY

## 2021-07-27 PROCEDURE — 71000033 HC RECOVERY, INTIAL HOUR: Performed by: OTOLARYNGOLOGY

## 2021-07-27 PROCEDURE — 36000704 HC OR TIME LEV I 1ST 15 MIN: Performed by: OTOLARYNGOLOGY

## 2021-07-27 PROCEDURE — D9220A PRA ANESTHESIA: Mod: ANES,,, | Performed by: ANESTHESIOLOGY

## 2021-07-27 PROCEDURE — 27201423 OPTIME MED/SURG SUP & DEVICES STERILE SUPPLY: Performed by: OTOLARYNGOLOGY

## 2021-07-27 PROCEDURE — 82962 GLUCOSE BLOOD TEST: CPT

## 2021-07-27 PROCEDURE — 36000705 HC OR TIME LEV I EA ADD 15 MIN: Performed by: OTOLARYNGOLOGY

## 2021-07-27 PROCEDURE — 27800903 OPTIME MED/SURG SUP & DEVICES OTHER IMPLANTS: Performed by: OTOLARYNGOLOGY

## 2021-07-27 PROCEDURE — 37000008 HC ANESTHESIA 1ST 15 MINUTES: Performed by: OTOLARYNGOLOGY

## 2021-07-27 PROCEDURE — 63600175 PHARM REV CODE 636 W HCPCS: Performed by: NURSE ANESTHETIST, CERTIFIED REGISTERED

## 2021-07-27 PROCEDURE — 25000003 PHARM REV CODE 250: Performed by: NURSE ANESTHETIST, CERTIFIED REGISTERED

## 2021-07-27 PROCEDURE — 37000009 HC ANESTHESIA EA ADD 15 MINS: Performed by: OTOLARYNGOLOGY

## 2021-07-27 PROCEDURE — 25000003 PHARM REV CODE 250

## 2021-07-27 PROCEDURE — 71000015 HC POSTOP RECOV 1ST HR: Performed by: OTOLARYNGOLOGY

## 2021-07-27 PROCEDURE — 69436 CREATE EARDRUM OPENING: CPT | Mod: 50,,, | Performed by: OTOLARYNGOLOGY

## 2021-07-27 PROCEDURE — 71000016 HC POSTOP RECOV ADDL HR: Performed by: OTOLARYNGOLOGY

## 2021-07-27 PROCEDURE — D9220A PRA ANESTHESIA: ICD-10-PCS | Mod: ANES,,, | Performed by: ANESTHESIOLOGY

## 2021-07-27 PROCEDURE — 63600175 PHARM REV CODE 636 W HCPCS: Performed by: ANESTHESIOLOGY

## 2021-07-27 PROCEDURE — D9220A PRA ANESTHESIA: Mod: CRNA,,, | Performed by: NURSE ANESTHETIST, CERTIFIED REGISTERED

## 2021-07-27 PROCEDURE — 25000003 PHARM REV CODE 250: Performed by: OTOLARYNGOLOGY

## 2021-07-27 RX ORDER — MORPHINE SULFATE 10 MG/ML
4 INJECTION INTRAMUSCULAR; INTRAVENOUS; SUBCUTANEOUS EVERY 5 MIN PRN
Status: DISCONTINUED | OUTPATIENT
Start: 2021-07-27 | End: 2021-07-27 | Stop reason: HOSPADM

## 2021-07-27 RX ORDER — DIPHENHYDRAMINE HYDROCHLORIDE 50 MG/ML
25 INJECTION INTRAMUSCULAR; INTRAVENOUS EVERY 6 HOURS PRN
Status: DISCONTINUED | OUTPATIENT
Start: 2021-07-27 | End: 2021-07-27 | Stop reason: HOSPADM

## 2021-07-27 RX ORDER — HYDROMORPHONE HYDROCHLORIDE 2 MG/ML
0.5 INJECTION, SOLUTION INTRAMUSCULAR; INTRAVENOUS; SUBCUTANEOUS EVERY 5 MIN PRN
Status: DISCONTINUED | OUTPATIENT
Start: 2021-07-27 | End: 2021-07-27 | Stop reason: HOSPADM

## 2021-07-27 RX ORDER — MEPERIDINE HYDROCHLORIDE 25 MG/ML
25 INJECTION INTRAMUSCULAR; INTRAVENOUS; SUBCUTANEOUS EVERY 10 MIN PRN
Status: DISCONTINUED | OUTPATIENT
Start: 2021-07-27 | End: 2021-07-27 | Stop reason: HOSPADM

## 2021-07-27 RX ORDER — PROPOFOL 10 MG/ML
VIAL (ML) INTRAVENOUS
Status: DISCONTINUED | OUTPATIENT
Start: 2021-07-27 | End: 2021-07-27

## 2021-07-27 RX ORDER — ONDANSETRON 2 MG/ML
INJECTION INTRAMUSCULAR; INTRAVENOUS
Status: DISCONTINUED | OUTPATIENT
Start: 2021-07-27 | End: 2021-07-27

## 2021-07-27 RX ORDER — ONDANSETRON 2 MG/ML
4 INJECTION INTRAMUSCULAR; INTRAVENOUS DAILY PRN
Status: DISCONTINUED | OUTPATIENT
Start: 2021-07-27 | End: 2021-07-27 | Stop reason: HOSPADM

## 2021-07-27 RX ORDER — FENTANYL CITRATE 50 UG/ML
INJECTION, SOLUTION INTRAMUSCULAR; INTRAVENOUS
Status: DISCONTINUED | OUTPATIENT
Start: 2021-07-27 | End: 2021-07-27

## 2021-07-27 RX ORDER — CIPROFLOXACIN AND DEXAMETHASONE 3; 1 MG/ML; MG/ML
SUSPENSION/ DROPS AURICULAR (OTIC)
Status: DISCONTINUED | OUTPATIENT
Start: 2021-07-27 | End: 2021-07-27 | Stop reason: HOSPADM

## 2021-07-27 RX ORDER — SODIUM CHLORIDE 0.9 % (FLUSH) 0.9 %
10 SYRINGE (ML) INJECTION
Status: DISCONTINUED | OUTPATIENT
Start: 2021-07-27 | End: 2021-07-27 | Stop reason: HOSPADM

## 2021-07-27 RX ORDER — LIDOCAINE HYDROCHLORIDE 20 MG/ML
INJECTION, SOLUTION EPIDURAL; INFILTRATION; INTRACAUDAL; PERINEURAL
Status: DISCONTINUED | OUTPATIENT
Start: 2021-07-27 | End: 2021-07-27

## 2021-07-27 RX ORDER — LIDOCAINE HYDROCHLORIDE 10 MG/ML
1 INJECTION, SOLUTION EPIDURAL; INFILTRATION; INTRACAUDAL; PERINEURAL ONCE
Status: DISCONTINUED | OUTPATIENT
Start: 2021-07-27 | End: 2021-07-27 | Stop reason: HOSPADM

## 2021-07-27 RX ORDER — SODIUM CHLORIDE, SODIUM LACTATE, POTASSIUM CHLORIDE, CALCIUM CHLORIDE 600; 310; 30; 20 MG/100ML; MG/100ML; MG/100ML; MG/100ML
INJECTION, SOLUTION INTRAVENOUS CONTINUOUS
Status: DISCONTINUED | OUTPATIENT
Start: 2021-07-27 | End: 2021-07-27 | Stop reason: HOSPADM

## 2021-07-27 RX ADMIN — ONDANSETRON 4 MG: 2 INJECTION INTRAMUSCULAR; INTRAVENOUS at 09:07

## 2021-07-27 RX ADMIN — SODIUM CHLORIDE: 9 INJECTION, SOLUTION INTRAVENOUS at 09:07

## 2021-07-27 RX ADMIN — HUMAN INSULIN 3 UNITS: 100 INJECTION, SOLUTION SUBCUTANEOUS at 08:07

## 2021-07-27 RX ADMIN — MORPHINE SULFATE 4 MG: 10 INJECTION INTRAVENOUS at 09:07

## 2021-07-27 RX ADMIN — FENTANYL CITRATE 100 MCG: 50 INJECTION INTRAMUSCULAR; INTRAVENOUS at 09:07

## 2021-07-27 RX ADMIN — LIDOCAINE HYDROCHLORIDE 80 MG: 20 INJECTION, SOLUTION INTRAVENOUS at 09:07

## 2021-07-27 RX ADMIN — PROPOFOL 100 MG: 10 INJECTION, EMULSION INTRAVENOUS at 09:07

## 2021-07-27 RX ADMIN — MORPHINE SULFATE 2 MG: 10 INJECTION INTRAVENOUS at 10:07

## 2021-08-05 ENCOUNTER — OFFICE VISIT (OUTPATIENT)
Dept: OTOLARYNGOLOGY | Facility: CLINIC | Age: 51
End: 2021-08-05
Payer: COMMERCIAL

## 2021-08-05 VITALS — HEIGHT: 66 IN | WEIGHT: 213 LBS | BODY MASS INDEX: 34.23 KG/M2

## 2021-08-05 DIAGNOSIS — H65.23 CHRONIC SEROUS OTITIS MEDIA OF BOTH EARS: Primary | ICD-10-CM

## 2021-08-05 PROCEDURE — 99024 POSTOP FOLLOW-UP VISIT: CPT | Mod: ,,, | Performed by: OTOLARYNGOLOGY

## 2021-08-05 PROCEDURE — 99024 PR POST-OP FOLLOW-UP VISIT: ICD-10-PCS | Mod: ,,, | Performed by: OTOLARYNGOLOGY

## 2021-08-05 PROCEDURE — 99213 OFFICE O/P EST LOW 20 MIN: CPT | Mod: PBBFAC | Performed by: OTOLARYNGOLOGY

## 2021-08-18 ENCOUNTER — OFFICE VISIT (OUTPATIENT)
Dept: FAMILY MEDICINE | Facility: CLINIC | Age: 51
End: 2021-08-18
Payer: OTHER GOVERNMENT

## 2021-08-18 DIAGNOSIS — Z20.828 EXPOSURE TO SARS-ASSOCIATED CORONAVIRUS: Primary | ICD-10-CM

## 2021-08-18 LAB
CTP QC/QA: YES
FLUAV AG NPH QL: NEGATIVE
FLUBV AG NPH QL: NEGATIVE
SARS-COV-2 AG RESP QL IA.RAPID: NEGATIVE

## 2021-08-18 PROCEDURE — 87428 POCT SARS-COV2 (COVID) WITH FLU ANTIGEN: ICD-10-PCS | Mod: QW,CR,GC, | Performed by: FAMILY MEDICINE

## 2021-08-18 PROCEDURE — 87428 SARSCOV & INF VIR A&B AG IA: CPT | Mod: QW,CR,GC, | Performed by: FAMILY MEDICINE

## 2021-08-18 PROCEDURE — 99212 OFFICE O/P EST SF 10 MIN: CPT | Mod: GC,,, | Performed by: FAMILY MEDICINE

## 2021-08-18 PROCEDURE — 99212 PR OFFICE/OUTPT VISIT, EST, LEVL II, 10-19 MIN: ICD-10-PCS | Mod: GC,,, | Performed by: FAMILY MEDICINE

## 2021-09-02 ENCOUNTER — TELEPHONE (OUTPATIENT)
Dept: OTOLARYNGOLOGY | Facility: CLINIC | Age: 51
End: 2021-09-02

## 2021-09-02 DIAGNOSIS — H70.93 MASTOIDITIS OF BOTH SIDES: Primary | ICD-10-CM

## 2021-09-07 ENCOUNTER — CLINICAL SUPPORT (OUTPATIENT)
Dept: PULMONOLOGY | Facility: HOSPITAL | Age: 51
End: 2021-09-07
Attending: INTERNAL MEDICINE
Payer: OTHER GOVERNMENT

## 2021-09-07 ENCOUNTER — HOSPITAL ENCOUNTER (OUTPATIENT)
Dept: RADIOLOGY | Facility: HOSPITAL | Age: 51
Discharge: HOME | End: 2021-09-07
Attending: INTERNAL MEDICINE
Payer: OTHER GOVERNMENT

## 2021-09-07 ENCOUNTER — OFFICE VISIT (OUTPATIENT)
Dept: PULMONOLOGY | Facility: CLINIC | Age: 51
End: 2021-09-07
Payer: COMMERCIAL

## 2021-09-07 VITALS
HEIGHT: 66 IN | BODY MASS INDEX: 34.23 KG/M2 | HEART RATE: 83 BPM | DIASTOLIC BLOOD PRESSURE: 78 MMHG | WEIGHT: 213 LBS | OXYGEN SATURATION: 99 % | SYSTOLIC BLOOD PRESSURE: 132 MMHG | RESPIRATION RATE: 18 BRPM

## 2021-09-07 DIAGNOSIS — R06.2 WHEEZING: ICD-10-CM

## 2021-09-07 PROCEDURE — 94729 DIFFUSING CAPACITY: CPT

## 2021-09-07 PROCEDURE — 99213 PR OFFICE/OUTPT VISIT, EST, LEVL III, 20-29 MIN: ICD-10-PCS | Mod: S$PBB,25,, | Performed by: INTERNAL MEDICINE

## 2021-09-07 PROCEDURE — 99214 OFFICE O/P EST MOD 30 MIN: CPT | Mod: PBBFAC,25 | Performed by: INTERNAL MEDICINE

## 2021-09-07 PROCEDURE — 71046 X-RAY EXAM CHEST 2 VIEWS: CPT | Mod: TC

## 2021-09-07 PROCEDURE — 94060 EVALUATION OF WHEEZING: CPT | Mod: 26,,, | Performed by: INTERNAL MEDICINE

## 2021-09-07 PROCEDURE — 94726 PLETHYSMOGRAPHY LUNG VOLUMES: CPT

## 2021-09-07 PROCEDURE — 94060 EVALUATION OF WHEEZING: CPT | Performed by: INTERNAL MEDICINE

## 2021-09-07 PROCEDURE — 99213 OFFICE O/P EST LOW 20 MIN: CPT | Mod: S$PBB,25,, | Performed by: INTERNAL MEDICINE

## 2021-09-07 PROCEDURE — 71046 XR CHEST PA AND LATERAL: ICD-10-PCS | Mod: 26,,, | Performed by: RADIOLOGY

## 2021-09-07 PROCEDURE — 94060 EVALUATION OF WHEEZING: CPT

## 2021-09-07 PROCEDURE — 94010 BREATHING CAPACITY TEST: CPT | Mod: PBBFAC | Performed by: INTERNAL MEDICINE

## 2021-09-07 PROCEDURE — 94060 PR EVAL OF BRONCHOSPASM: ICD-10-PCS | Mod: 26,,, | Performed by: INTERNAL MEDICINE

## 2021-09-07 PROCEDURE — 71046 X-RAY EXAM CHEST 2 VIEWS: CPT | Mod: 26,,, | Performed by: RADIOLOGY

## 2021-09-07 RX ORDER — LEVOFLOXACIN 500 MG/1
500 TABLET, FILM COATED ORAL DAILY
Qty: 10 TABLET | Refills: 0 | Status: SHIPPED | OUTPATIENT
Start: 2021-09-07 | End: 2022-01-13

## 2021-09-07 RX ORDER — ALBUTEROL SULFATE 90 UG/1
2 AEROSOL, METERED RESPIRATORY (INHALATION) EVERY 6 HOURS PRN
Qty: 18 G | Refills: 3 | Status: SHIPPED | OUTPATIENT
Start: 2021-09-07 | End: 2022-01-13 | Stop reason: SDUPTHER

## 2021-10-25 ENCOUNTER — PATIENT MESSAGE (OUTPATIENT)
Dept: DIABETES SERVICES | Facility: CLINIC | Age: 51
End: 2021-10-25
Payer: OTHER GOVERNMENT

## 2021-11-01 RX ORDER — INSULIN GLARGINE 100 [IU]/ML
55 INJECTION, SOLUTION SUBCUTANEOUS 2 TIMES DAILY
Qty: 90 ML | Refills: 3 | Status: SHIPPED | OUTPATIENT
Start: 2021-11-01 | End: 2021-11-15 | Stop reason: SDUPTHER

## 2021-11-01 RX ORDER — FLASH GLUCOSE SENSOR
KIT MISCELLANEOUS
Qty: 6 KIT | Refills: 3 | Status: SHIPPED | OUTPATIENT
Start: 2021-11-01 | End: 2022-11-02 | Stop reason: SDUPTHER

## 2021-11-04 ENCOUNTER — OFFICE VISIT (OUTPATIENT)
Dept: OTOLARYNGOLOGY | Facility: CLINIC | Age: 51
End: 2021-11-04
Payer: COMMERCIAL

## 2021-11-04 DIAGNOSIS — H65.23 CHRONIC SEROUS OTITIS MEDIA OF BOTH EARS: Primary | ICD-10-CM

## 2021-11-04 DIAGNOSIS — G44.001 INTRACTABLE CLUSTER HEADACHE SYNDROME, UNSPECIFIED CHRONICITY PATTERN: ICD-10-CM

## 2021-11-04 DIAGNOSIS — J32.8 OTHER CHRONIC SINUSITIS: ICD-10-CM

## 2021-11-04 PROCEDURE — 99212 OFFICE O/P EST SF 10 MIN: CPT | Mod: PBBFAC | Performed by: OTOLARYNGOLOGY

## 2021-11-04 PROCEDURE — 99214 PR OFFICE/OUTPT VISIT, EST, LEVL IV, 30-39 MIN: ICD-10-PCS | Mod: S$PBB,,, | Performed by: OTOLARYNGOLOGY

## 2021-11-04 PROCEDURE — 99214 OFFICE O/P EST MOD 30 MIN: CPT | Mod: S$PBB,,, | Performed by: OTOLARYNGOLOGY

## 2021-11-15 ENCOUNTER — PATIENT MESSAGE (OUTPATIENT)
Dept: DIABETES SERVICES | Facility: CLINIC | Age: 51
End: 2021-11-15
Payer: OTHER GOVERNMENT

## 2021-11-15 RX ORDER — INSULIN GLARGINE 100 [IU]/ML
INJECTION, SOLUTION SUBCUTANEOUS
Qty: 90 ML | Refills: 3 | Status: SHIPPED | OUTPATIENT
Start: 2021-11-15 | End: 2022-01-25 | Stop reason: SDUPTHER

## 2021-11-15 RX ORDER — INSULIN ASPART 100 [IU]/ML
INJECTION, SOLUTION INTRAVENOUS; SUBCUTANEOUS
Qty: 45 ML | Refills: 1 | Status: SHIPPED | OUTPATIENT
Start: 2021-11-15 | End: 2022-01-25 | Stop reason: SDUPTHER

## 2021-11-17 ENCOUNTER — TELEPHONE (OUTPATIENT)
Dept: OTOLARYNGOLOGY | Facility: CLINIC | Age: 51
End: 2021-11-17
Payer: OTHER GOVERNMENT

## 2021-11-17 ENCOUNTER — PATIENT MESSAGE (OUTPATIENT)
Dept: OTOLARYNGOLOGY | Facility: CLINIC | Age: 51
End: 2021-11-17
Payer: OTHER GOVERNMENT

## 2021-11-17 DIAGNOSIS — J32.9 CHRONIC SINUSITIS, UNSPECIFIED LOCATION: Primary | ICD-10-CM

## 2021-11-17 RX ORDER — CIPROFLOXACIN 500 MG/1
500 TABLET ORAL 2 TIMES DAILY
Qty: 28 TABLET | Refills: 0 | Status: SHIPPED | OUTPATIENT
Start: 2021-11-17 | End: 2022-01-13

## 2021-11-22 ENCOUNTER — OFFICE VISIT (OUTPATIENT)
Dept: OTOLARYNGOLOGY | Facility: CLINIC | Age: 51
End: 2021-11-22
Payer: COMMERCIAL

## 2021-11-22 VITALS — HEIGHT: 66 IN | BODY MASS INDEX: 34.23 KG/M2 | WEIGHT: 213 LBS

## 2021-11-22 DIAGNOSIS — J32.9 CHRONIC SINUSITIS, UNSPECIFIED LOCATION: ICD-10-CM

## 2021-11-22 DIAGNOSIS — H65.23 CHRONIC SEROUS OTITIS MEDIA OF BOTH EARS: Primary | ICD-10-CM

## 2021-11-22 PROCEDURE — 99214 OFFICE O/P EST MOD 30 MIN: CPT | Mod: S$PBB,,, | Performed by: OTOLARYNGOLOGY

## 2021-11-22 PROCEDURE — 99214 PR OFFICE/OUTPT VISIT, EST, LEVL IV, 30-39 MIN: ICD-10-PCS | Mod: S$PBB,,, | Performed by: OTOLARYNGOLOGY

## 2021-11-22 PROCEDURE — 99215 OFFICE O/P EST HI 40 MIN: CPT | Mod: PBBFAC | Performed by: OTOLARYNGOLOGY

## 2022-01-13 ENCOUNTER — OFFICE VISIT (OUTPATIENT)
Dept: PULMONOLOGY | Facility: CLINIC | Age: 52
End: 2022-01-13
Payer: OTHER GOVERNMENT

## 2022-01-13 VITALS
DIASTOLIC BLOOD PRESSURE: 70 MMHG | SYSTOLIC BLOOD PRESSURE: 130 MMHG | RESPIRATION RATE: 18 BRPM | HEART RATE: 70 BPM | OXYGEN SATURATION: 97 %

## 2022-01-13 DIAGNOSIS — E66.9 OBESITY, UNSPECIFIED CLASSIFICATION, UNSPECIFIED OBESITY TYPE, UNSPECIFIED WHETHER SERIOUS COMORBIDITY PRESENT: ICD-10-CM

## 2022-01-13 DIAGNOSIS — R06.2 WHEEZING: ICD-10-CM

## 2022-01-13 DIAGNOSIS — R06.2 WHEEZING: Primary | Chronic | ICD-10-CM

## 2022-01-13 PROCEDURE — 99213 PR OFFICE/OUTPT VISIT, EST, LEVL III, 20-29 MIN: ICD-10-PCS | Mod: S$PBB,,, | Performed by: INTERNAL MEDICINE

## 2022-01-13 PROCEDURE — 99215 OFFICE O/P EST HI 40 MIN: CPT | Mod: PBBFAC | Performed by: INTERNAL MEDICINE

## 2022-01-13 PROCEDURE — 99213 OFFICE O/P EST LOW 20 MIN: CPT | Mod: S$PBB,,, | Performed by: INTERNAL MEDICINE

## 2022-01-13 RX ORDER — BUDESONIDE AND FORMOTEROL FUMARATE DIHYDRATE 80; 4.5 UG/1; UG/1
2 AEROSOL RESPIRATORY (INHALATION) 2 TIMES DAILY
Qty: 10.2 G | Refills: 4 | Status: SHIPPED | OUTPATIENT
Start: 2022-01-13 | End: 2022-01-25

## 2022-01-13 NOTE — PROGRESS NOTES
Subjective:       Patient ID: Talia Lawrence is a 51 y.o. female.    Chief Complaint: Follow-up (Here for follow up wheezing. )    51-year-old female with history of diabetes, hypertension, CKD stage 2, gastroesophageal reflux who is referred from Dr. Gastelum for persistent wheeze.  Patient states she has had wheezing for many years that comes and goes.  She notices is both with activity and rest.  He cannot identify any aggravating or relieving factors.  She does have some associated shortness of breath.  She has a significant history of sinus issues and has had recent sinus surgery.  She has been on multiple recent courses of antibiotics and steroids for sinus troubles.  She is a lifelong nonsmoker.  She does have a significant family history of asthma in her father and her children.  She endorses a weight gain, unknown amount.  She has a history of migraines sinus difficulty with postnasal drip, 3-4 pillow orthopnea.  Denies lower extremity edema.  No significant occupational exposures.  Her has does have carpet, denies mold, no pets in the home.  She does not currently have a primary care provider, but sees the acute care clinic in [facility].  Using albuterol 1-2 times per week. Continues to have sporadic wheezing spells. Albuterol seems to be less effective than previously.    Wheezing   Associated symptoms include headaches and rhinorrhea. Pertinent negatives include no abdominal pain, chest pain, chills, coughing, fever, hemoptysis, rash, shortness of breath, sore throat, sputum production or vomiting.   Follow-up  Associated symptoms include headaches. Pertinent negatives include no abdominal pain, arthralgias, chest pain, chills, congestion, coughing, fever, joint swelling, myalgias, nausea, rash, sore throat or vomiting.      Social History     Tobacco Use    Smoking status: Never Smoker    Smokeless tobacco: Never Used   Substance Use Topics    Alcohol use: Not Currently    Drug  use: Never      Review of Systems   Constitutional: Positive for weight gain. Negative for fever, chills, activity change, appetite change and night sweats.   HENT: Positive for postnasal drip, rhinorrhea and sinus pressure. Negative for sore throat, voice change and congestion.    Respiratory: Positive for wheezing and orthopnea. Negative for cough, hemoptysis, sputum production, chest tightness, shortness of breath and use of rescue inhaler.    Cardiovascular: Negative for chest pain, palpitations and leg swelling.   Musculoskeletal: Negative for arthralgias, back pain, joint swelling and myalgias.   Skin: Negative for rash.   Gastrointestinal: Positive for acid reflux. Negative for nausea, vomiting and abdominal pain.   Neurological: Positive for headaches. Negative for syncope and light-headedness.   Hematological: Negative for adenopathy. Does not bruise/bleed easily and no excessive bruising.   Psychiatric/Behavioral: Negative for confusion and sleep disturbance. The patient is not nervous/anxious.        Objective:      Physical Exam   Constitutional: She is oriented to person, place, and time. She appears well-developed. No distress. She is obese.   HENT:   Head: Normocephalic.   Nose: Nose normal.   Neck: No JVD present. No tracheal deviation present. Thyromegaly present.   Cardiovascular: Normal rate, regular rhythm and normal heart sounds.   Pulmonary/Chest: Effort normal and breath sounds normal. No respiratory distress. She has no wheezes. She has no rhonchi. She has no rales.   Abdominal: Soft. Bowel sounds are normal. There is no abdominal tenderness.   Musculoskeletal:         General: No deformity or edema.      Cervical back: Neck supple.   Lymphadenopathy: No supraclavicular adenopathy is present.     She has no cervical adenopathy.   Neurological: She is alert and oriented to person, place, and time. No cranial nerve deficit.   Skin: Skin is warm and dry. No rash noted.   Psychiatric: She has a  "normal mood and affect. Her behavior is normal.   Vitals reviewed.    Personal Diagnostic Review  Spirometry: No obstruction. Low FEV1 and FVC, possible restriction. No significant bronchodilator response.     No flowsheet data found.      Assessment:       1. Wheezing    2. Obesity, unspecified classification, unspecified obesity type, unspecified whether serious comorbidity present        Outpatient Encounter Medications as of 1/13/2022   Medication Sig Dispense Refill    albuterol (PROVENTIL/VENTOLIN HFA) 90 mcg/actuation inhaler Inhale 2 puffs into the lungs every 6 (six) hours as needed for Wheezing. Rescue 18 g 3    metoclopramide HCl (REGLAN) 10 MG tablet Take 10 mg by mouth every 6 (six) hours as needed.      pravastatin (PRAVACHOL) 40 MG tablet Take 40 mg by mouth every evening. Pt states she takes 2 tablest daily      amLODIPine-benazepriL (LOTREL) 5-40 mg per capsule amlodipine 5 mg-benazepril 40 mg capsule   TAKE ONE CAPSULE BY MOUTH EVERY DAY      BD ULTRA-FINE CARLINE PEN NEEDLE 32 gauge x 5/32" Ndle Use to inject insulin 5 times daily. 100 each 11    blood sugar diagnostic (BLOOD GLUCOSE TEST) Strp To check blood sugar  each 3    blood-glucose meter (TRUE METRIX GLUCOSE METER MISC) by Misc.(Non-Drug; Combo Route) route. Use as directed      budesonide-formoterol 80-4.5 mcg (SYMBICORT) 80-4.5 mcg/actuation HFAA Inhale 2 puffs into the lungs 2 (two) times a day. Controller 10.2 g 4    buPROPion (WELLBUTRIN XL) 150 MG TB24 tablet Take 150 mg by mouth once daily.      cholecalciferol, vitamin D3, 125 mcg (5,000 unit) Tab Take 5,000 Units by mouth once daily.      cimetidine (TAGAMET) 200 MG tablet Take 200 mg by mouth daily as needed.      dorzolamide-timolol 2-0.5% (COSOPT) 22.3-6.8 mg/mL ophthalmic solution instill one drop in affected eye twice a day.      flash glucose scanning reader (FREESTYLE CARLOS 2 READER) [facility] Use as directed. 1 each 0    flash glucose sensor (FREESTYLE CARLOS 2 " SENSOR) Kit Apply sensor every 14 days as directed. 6 kit 3    fluticasone propionate (FLONASE) 50 mcg/actuation nasal spray 2 sprays by Each Nostril route once daily.      galcanezumab-gnlm (EMGALITY PEN) 120 mg/mL PnIj Inject 1 ml subcutaneous monthly 1 mL 11    glucagon HCL (GLUCAGON, HCL, EMERGENCY KIT) 1 mg SolR Inject as directed. Use as directed for hypoglycemia      hydrOXYzine pamoate (VISTARIL) 25 MG Cap Take 25 mg by mouth 3 (three) times daily as needed.      insulin (BASAGLAR KWIKPEN U-100 INSULIN) glargine 100 units/mL (3mL) SubQ pen Inject 55 units sq every morning and 55 units sq every evening 90 mL 3    insulin aspart U-100 (NOVOLOG FLEXPEN U-100 INSULIN) 100 unit/mL (3 mL) InPn pen Inject sq following sliding scale not to exceed 50 units daily 45 mL 1    lancets 30 gauge Misc Use to check glucose 4 times daily. 40 each 3    magnesium gluconate (MAG-G) 27 mg magnesium (500 mg) Tab tablet Take 27 mg by mouth every evening.      metoprolol succinate (TOPROL-XL) 25 MG 24 hr tablet Take 25 mg by mouth once daily.      ondansetron (ZOFRAN-ODT) 8 MG TbDL ondansetron 8 mg disintegrating tablet   DISSOLVE ONE TABLET ON TONGUE TWICE DAILY AS NEEDED      pantoprazole (PROTONIX) 40 MG tablet Take 40 mg by mouth once daily.      promethazine (PHENERGAN) 25 MG tablet Take 25 mg by mouth every 4 (four) hours. One tablet every 8 hours prn for nausea or headache, no more than 2 in a day or 3 days a week, no driving when taking this medication.      sulfamethoxazole-trimethoprim 800-160mg (BACTRIM DS) 800-160 mg Tab Take 1 tablet by mouth 2 (two) times daily. 20 tablet 0    UBROGEPANT 100 mg tablet TAKE ONE TABLET BY MOUTH AT ONSET of migraine, MAY REPEAT DOSE AFTER TWO hours. no more THAN TWO doses in APPLY 24 hour period      [DISCONTINUED] ciprofloxacin HCl (CIPRO) 500 MG tablet Take 1 tablet (500 mg total) by mouth 2 (two) times a day. 28 tablet 0    [DISCONTINUED] clindamycin (CLEOCIN) 300 MG  capsule Take 1 capsule (300 mg total) by mouth 2 (two) times a day. (Patient not taking: Reported on 7/19/2021) 28 capsule 0    [DISCONTINUED] levoFLOXacin (LEVAQUIN) 500 MG tablet Take 1 tablet (500 mg total) by mouth once daily. 10 tablet 0     No facility-administered encounter medications on file as of 1/13/2022.     No orders of the defined types were placed in this encounter.      Plan:       Wheezing  - possible mild asthma  - spirometry pre/post, no significant bronchodilator response. No obstruction. Low FEV1 and FVC, possible restriction.   - continue albuterol for use PRN, try low dose symbicort to see if improvement. May discontinue if no noted difference  - encouraged weight loss    RTC in 3-4 months

## 2022-01-14 RX ORDER — ALBUTEROL SULFATE 90 UG/1
2 AEROSOL, METERED RESPIRATORY (INHALATION) EVERY 6 HOURS PRN
Qty: 18 G | Refills: 3 | Status: SHIPPED | OUTPATIENT
Start: 2022-01-14 | End: 2022-04-21 | Stop reason: SDUPTHER

## 2022-01-25 ENCOUNTER — OFFICE VISIT (OUTPATIENT)
Dept: DIABETES SERVICES | Facility: CLINIC | Age: 52
End: 2022-01-25
Payer: OTHER GOVERNMENT

## 2022-01-25 VITALS
OXYGEN SATURATION: 99 % | WEIGHT: 211.19 LBS | BODY MASS INDEX: 33.94 KG/M2 | HEART RATE: 87 BPM | DIASTOLIC BLOOD PRESSURE: 62 MMHG | SYSTOLIC BLOOD PRESSURE: 146 MMHG | RESPIRATION RATE: 16 BRPM | HEIGHT: 66 IN

## 2022-01-25 DIAGNOSIS — E78.5 HYPERLIPIDEMIA, UNSPECIFIED HYPERLIPIDEMIA TYPE: ICD-10-CM

## 2022-01-25 DIAGNOSIS — E10.9 TYPE 1 DIABETES MELLITUS WITHOUT COMPLICATION: Primary | ICD-10-CM

## 2022-01-25 DIAGNOSIS — N18.2 STAGE 2 CHRONIC KIDNEY DISEASE DUE TO TYPE 2 DIABETES MELLITUS: ICD-10-CM

## 2022-01-25 DIAGNOSIS — E11.22 STAGE 2 CHRONIC KIDNEY DISEASE DUE TO TYPE 2 DIABETES MELLITUS: ICD-10-CM

## 2022-01-25 DIAGNOSIS — I10 PRIMARY HYPERTENSION: ICD-10-CM

## 2022-01-25 LAB
GLUCOSE SERPL-MCNC: 52 MG/DL (ref 70–110)
HBA1C MFR BLD: 7.3 % (ref 4.5–6.6)

## 2022-01-25 PROCEDURE — 99214 OFFICE O/P EST MOD 30 MIN: CPT | Mod: S$PBB,,, | Performed by: NURSE PRACTITIONER

## 2022-01-25 PROCEDURE — 99215 OFFICE O/P EST HI 40 MIN: CPT | Mod: PBBFAC | Performed by: NURSE PRACTITIONER

## 2022-01-25 PROCEDURE — 99214 PR OFFICE/OUTPT VISIT, EST, LEVL IV, 30-39 MIN: ICD-10-PCS | Mod: S$PBB,,, | Performed by: NURSE PRACTITIONER

## 2022-01-25 PROCEDURE — 82962 GLUCOSE BLOOD TEST: CPT | Mod: PBBFAC | Performed by: NURSE PRACTITIONER

## 2022-01-25 PROCEDURE — 83036 HEMOGLOBIN GLYCOSYLATED A1C: CPT | Mod: PBBFAC | Performed by: NURSE PRACTITIONER

## 2022-01-25 RX ORDER — INSULIN GLARGINE 100 [IU]/ML
INJECTION, SOLUTION SUBCUTANEOUS
Qty: 90 ML | Refills: 3 | Status: SHIPPED | OUTPATIENT
Start: 2022-01-25 | End: 2022-02-09 | Stop reason: SDUPTHER

## 2022-01-25 RX ORDER — INSULIN ASPART 100 [IU]/ML
INJECTION, SOLUTION INTRAVENOUS; SUBCUTANEOUS
Qty: 45 ML | Refills: 1 | Status: SHIPPED | OUTPATIENT
Start: 2022-01-25 | End: 2022-02-09 | Stop reason: SDUPTHER

## 2022-01-25 RX ORDER — FLASH GLUCOSE SCANNING READER
EACH MISCELLANEOUS
Qty: 1 EACH | Refills: 0 | Status: SHIPPED | OUTPATIENT
Start: 2022-01-25

## 2022-01-25 NOTE — PROGRESS NOTES
Subjective:       Patient ID: Talia Lawrence is a 51 y.o. female.    Chief Complaint: Diabetes Mellitus (Pt here for follow up visit and A1c, pt reported her sugar was 40 just before appt, stated she ate a biscuit and fries before coming into office, sugar 52 at appt time, gave apple juice and crackers with pb. Also reports paramedics were called x 1 for low bs; no trip to ER. Checks sugar at least 4 times a day.)    Here today for routine evaluation and med refill.     Review of Systems   Constitutional: Negative for activity change, appetite change, diaphoresis and fatigue.   HENT: Negative for nasal congestion, facial swelling and sinus pressure/congestion.    Eyes: Negative for visual disturbance.   Respiratory: Negative for shortness of breath and wheezing.    Cardiovascular: Negative for chest pain and leg swelling.   Gastrointestinal: Negative for constipation, diarrhea, nausea and vomiting.   Endocrine: Negative for polydipsia, polyphagia and polyuria.   Genitourinary: Negative for dysuria, frequency and urgency.   Musculoskeletal: Negative for gait problem and myalgias.   Integumentary:  Negative for color change, rash and wound.   Neurological: Negative for dizziness, syncope, weakness, headaches, disturbances in coordination and coordination difficulties.   Hematological: Does not bruise/bleed easily.   Psychiatric/Behavioral: Negative for self-injury, sleep disturbance and suicidal ideas. The patient is not nervous/anxious.          Objective:      Physical Exam  Vitals and nursing note reviewed.   Constitutional:       Appearance: Normal appearance.   HENT:      Head: Normocephalic.   Cardiovascular:      Rate and Rhythm: Normal rate.   Pulmonary:      Effort: Pulmonary effort is normal.   Musculoskeletal:         General: Normal range of motion.   Skin:     General: Skin is warm and dry.   Neurological:      General: No focal deficit present.      Mental Status: She is alert and oriented to  person, place, and time.   Psychiatric:         Mood and Affect: Mood normal.         Behavior: Behavior normal.         Thought Content: Thought content normal.         Judgment: Judgment normal.         Assessment:       Problem List Items Addressed This Visit        Cardiac/Vascular    HTN (hypertension)    Hyperlipidemia       Endocrine    Diabetes mellitus type I - Primary    Relevant Orders    Hemoglobin A1C, POCT (Completed)    POCT Glucose, Hand-Held Device (Completed)    Stage 2 chronic kidney disease due to type 2 diabetes mellitus          Plan:       Problem List Items Addressed This Visit        Cardiac/Vascular    HTN (hypertension)    Hyperlipidemia       Endocrine    Diabetes mellitus type I - Primary    Relevant Orders    Hemoglobin A1C, POCT (Completed)    POCT Glucose, Hand-Held Device (Completed)    Stage 2 chronic kidney disease due to type 2 diabetes mellitus

## 2022-01-25 NOTE — PATIENT INSTRUCTIONS
Pt is advised to monitor and document glucose fasting when you wake up before you eat and 2 hours after meal and bring in meter to next visit.      Ensure to take medications as directed.      Follow diabetic diet as directed.      Work to achieve normal body weight.     Ensure to exercise 4-5 times per week for 20 minutes. Snacks with 0-5 grams carbs   Hard Boiled Egg   Crystal Light, Vitamin Water, Powerade Zero   Herbal tea, unsweetened   8 oz unsweetened almond milk   2 tsp peanut butter on celery   ½ cup sugar-free Jell-O   1 sugar-free popsicle   Non starchy vegetables such as carrots or celery sticks with lowfat dressing   ½ oz lowfat cheese or string cheese   1 closed handful of nuts or tbsp of seeds, unsalted    Snacks with 15 gram carbs  . 1 small piece of fruit or . banana or . cup light canned fruit  . 3 lara cracker squares  . 3 cups popcorn  . 5 Vanilla Wafers  . 1/2 cup low fat, no added sugar ice cream or frozen yogurt  . 1/2 turkey, ham, or chicken sandwich  . 1.2 cup fruit with 1/2 cup of cottage cheese  . 4-6 unsalted wheat crackers with 1 oz low fat cheese or 1 tbsp peanut butter  . 30 goldfish crackers  . 7-8 mini rice cakes  . 1/3 cup hummus dip with raw vegetables  . 1/2 whole wheat adriana, 1 tbsp hummus  . Mini pizza (. whole wheat English muffin, low-fat cheese, tomato sauce)  . 100 calorie snack pack  . 4-6 oz light yogurt  . 1/2 cup sugar-free pudding      basaglar 50 units bid to decrease hypoglycemia

## 2022-02-07 ENCOUNTER — OFFICE VISIT (OUTPATIENT)
Dept: OTOLARYNGOLOGY | Facility: CLINIC | Age: 52
End: 2022-02-07
Payer: OTHER GOVERNMENT

## 2022-02-07 VITALS — BODY MASS INDEX: 33.91 KG/M2 | WEIGHT: 211 LBS | HEIGHT: 66 IN

## 2022-02-07 DIAGNOSIS — H61.23 BILATERAL IMPACTED CERUMEN: ICD-10-CM

## 2022-02-07 DIAGNOSIS — H65.23 BILATERAL CHRONIC SEROUS OTITIS MEDIA: Primary | ICD-10-CM

## 2022-02-07 PROCEDURE — 99214 OFFICE O/P EST MOD 30 MIN: CPT | Mod: PBBFAC | Performed by: OTOLARYNGOLOGY

## 2022-02-07 PROCEDURE — 99214 PR OFFICE/OUTPT VISIT, EST, LEVL IV, 30-39 MIN: ICD-10-PCS | Mod: S$PBB,,, | Performed by: OTOLARYNGOLOGY

## 2022-02-07 PROCEDURE — 99214 OFFICE O/P EST MOD 30 MIN: CPT | Mod: S$PBB,,, | Performed by: OTOLARYNGOLOGY

## 2022-02-07 RX ORDER — CIPROFLOXACIN AND DEXAMETHASONE 3; 1 MG/ML; MG/ML
3 SUSPENSION/ DROPS AURICULAR (OTIC) 2 TIMES DAILY
Qty: 7.5 ML | Refills: 0 | Status: SHIPPED | OUTPATIENT
Start: 2022-02-07 | End: 2022-08-02

## 2022-02-07 NOTE — PROGRESS NOTES
Subjective:       Patient ID: Talia Lawrence is a 51 y.o. female.    Chief Complaint: Follow-up (Patient is a follow up for pe tubes. She states she still cannot hear well.)    HPI  Review of Systems   HENT: Positive for hearing loss.    All other systems reviewed and are negative.      Objective:      Physical Exam  General: NAD  Head: Normocephalic, atraumatic, no facial asymmetry/normal strength,  Ears: Both auricules normal in appearance, w/o deformities tympanic membranes tubes blocked by wax  external auditory canals wax   Nose: External nose w/o deformities normal turbinates no drainage or inflammation  Oral Cavity: Lips, gums, floor of mouth, tongue hard palate, and buccal mucosa without mass/lesion  Oropharynx: Mucosa pink and moist, soft palate, posterior pharynx and oropharyngeal wall without mass/lesion  Neck: Supple, symmetric, trachea midline, no palpable mass/lesion, no palpable cervical lymphadenopathy  Skin: Warm and dry, no concerning lesions  Respiratory: Respirations even, unlabored    Assessment:       1. Bilateral chronic serous otitis media    2. Bilateral impacted cerumen        Plan:       Ciprodex trial   F/u 3 weeks

## 2022-02-08 ENCOUNTER — HOSPITAL ENCOUNTER (OUTPATIENT)
Dept: RADIOLOGY | Facility: HOSPITAL | Age: 52
Discharge: HOME | End: 2022-02-08
Attending: NURSE PRACTITIONER
Payer: OTHER GOVERNMENT

## 2022-02-08 ENCOUNTER — OFFICE VISIT (OUTPATIENT)
Dept: VASCULAR SURGERY | Facility: CLINIC | Age: 52
End: 2022-02-08
Payer: OTHER GOVERNMENT

## 2022-02-08 ENCOUNTER — PATIENT MESSAGE (OUTPATIENT)
Dept: DIABETES SERVICES | Facility: CLINIC | Age: 52
End: 2022-02-08
Payer: OTHER GOVERNMENT

## 2022-02-08 VITALS
HEIGHT: 66 IN | BODY MASS INDEX: 33.33 KG/M2 | RESPIRATION RATE: 16 BRPM | WEIGHT: 207.38 LBS | SYSTOLIC BLOOD PRESSURE: 130 MMHG | HEART RATE: 68 BPM | DIASTOLIC BLOOD PRESSURE: 70 MMHG

## 2022-02-08 DIAGNOSIS — R60.9 EDEMA: ICD-10-CM

## 2022-02-08 DIAGNOSIS — E10.9 TYPE 1 DIABETES MELLITUS WITHOUT COMPLICATION: ICD-10-CM

## 2022-02-08 DIAGNOSIS — R60.0 EDEMA OF BOTH LOWER EXTREMITIES: Primary | ICD-10-CM

## 2022-02-08 DIAGNOSIS — M79.605 PAIN IN BOTH LOWER EXTREMITIES: ICD-10-CM

## 2022-02-08 DIAGNOSIS — L81.9 HYPERPIGMENTATION OF SKIN: ICD-10-CM

## 2022-02-08 DIAGNOSIS — M79.604 PAIN IN BOTH LOWER EXTREMITIES: ICD-10-CM

## 2022-02-08 PROCEDURE — 93970 EXTREMITY STUDY: CPT | Mod: 26,,, | Performed by: FAMILY MEDICINE

## 2022-02-08 PROCEDURE — 93970 EXTREMITY STUDY: CPT | Mod: TC

## 2022-02-08 PROCEDURE — 99204 OFFICE O/P NEW MOD 45 MIN: CPT | Mod: S$PBB,,, | Performed by: NURSE PRACTITIONER

## 2022-02-08 PROCEDURE — 93970 US VENOUS REFLUX STUDY BILATERAL: ICD-10-PCS | Mod: 26,,, | Performed by: FAMILY MEDICINE

## 2022-02-08 PROCEDURE — 99215 OFFICE O/P EST HI 40 MIN: CPT | Mod: PBBFAC,25 | Performed by: NURSE PRACTITIONER

## 2022-02-08 PROCEDURE — 99204 PR OFFICE/OUTPT VISIT, NEW, LEVL IV, 45-59 MIN: ICD-10-PCS | Mod: S$PBB,,, | Performed by: NURSE PRACTITIONER

## 2022-02-08 NOTE — PROGRESS NOTES
VEIN [facility]ENTER CLINIC NOTE    Patient ID: Talia Lawrence is a 51 y.o. female.    I. HISTORY     Chief Complaint:   Chief Complaint   Patient presents with    Referral     New pt referred by STEPHANIE Jiang for edema and pain.        HPI: Talia Lawrence is a 51 y.o. female who is referred here today by STEPHANIE Lewis  for evaluation of bilateral leg edema with discomfort.  Symptoms are as listed in the chart below and began several years ago.   Location is bilateral lower legs. Symptoms are progressive at the end of the day.  History of venous interventions includes None.  No known family history of venous disease.  Patient denies history of HF . No history of DVT. She reports history of Stage 2 kidney disease. Medical history positive for HTN and Diabetes.     Venous Disease Medical Necessity Documentation Initial Visit Date: 2-8-22 Return Check Date:    1. Have you ever had a rupture or bleed from a varicose vein in your leg(s)?              [] Yes  [x] No   [] Yes   [] No   2. Have you ever been diagnosed with phlebitis, cellulitis, or inflammation in the area of the varicose veins of  your leg(s)?  [] Yes  [x] No    [] Yes   [] No   3. Do you have darkened or inflamed skin on your legs?   [x] Yes   [] No   [] Yes   [] No   4. Do you have leg swelling?     [x] Yes   [] No   [] Yes   [] No   5. Do you have leg pain?   [x] Yes   [] No   [] Yes   [] No   If yes, describe the type of pain?    []   Stabbing []  Radiating [x]  Aching   []  Tightness []  Throbbing               []  Burning []  Cramping              6. Do you have leg discomfort?   [] Yes   [x] No   [] Yes   [] No   If yes, describe the type of discomfort?    []  Heaviness []  Fullness   []  Restlessness [] Tired/Fatigued [] Itching              7. Have you ever worn compression hose?   [] Yes   [x] No   [] Yes   [] No   If yes, how long?           8. Do you elevate your legs while sitting?   [x] Yes   [] No   [] Yes   [] No   9. Does venous  disease (varicose veins, ulcers, skin changes, leg pain/swelling) interfere with your daily life?  If yes, check activities you are limited or unable to do.    []  Shower  [x]   Walk  []  Exercise  [] Play with children/grandchildren  []  Shop [] Work [x] Stand for any period of time [] Sleep                               [x] Sitting for an extended period of time.           [x] Yes   [] No   [] Yes   [] No   10. Do you exercise/have you tried to exercise (i.e.  Walk our participate in a regular exercise routine)?  [] Yes  [x] No   [] Yes   [] No   11. BMI 33.5             Past Medical History:   Diagnosis Date    Diabetes mellitus type I     Disorder of kidney and ureter     GERD (gastroesophageal reflux disease)     Glaucoma     Goiter     HTN (hypertension)     Hyperlipidemia     Migraine headache without aura     Vitamin D deficiency         Past Surgical History:   Procedure Laterality Date    ANKLE FRACTURE SURGERY Right     ANKLE SURGERY Right     Plate and screws, fracture repair.    ANKLE SURGERY       SECTION      times 2    COLONOSCOPY  2017    Performed by Dr. Prescott.    HYSTERECTOMY      INJECTION OF FACET JOINT Bilateral 10/25/2018    Lumbar L3-S1 performed by Dr. Summers with Pain Treatment.    MYRINGOTOMY WITH INSERTION OF VENTILATION TUBE Bilateral 2021    Procedure: MYRINGOTOMY, WITH TYMPANOSTOMY TUBE INSERTION (T-TUBES);  Surgeon: Ki Gastelum MD;  Location: [facility];  Service: ENT;  Laterality: Bilateral;    ROTATOR CUFF REPAIR Bilateral     SINUS SURGERY      TENDON RELEASE         Social History     Tobacco Use   Smoking Status Never Smoker   Smokeless Tobacco Never Used         Current Outpatient Medications:     albuterol (PROVENTIL/VENTOLIN HFA) 90 mcg/actuation inhaler, Inhale 2 puffs into the lungs every 6 (six) hours as needed for Wheezing. Rescue, Disp: 18 g, Rfl: 3    amLODIPine-benazepriL (LOTREL) 5-40 mg per capsule, amlodipine 5  "mg-benazepril 40 mg capsule  TAKE ONE CAPSULE BY MOUTH EVERY DAY, Disp: , Rfl:     BD ULTRA-FINE CARLINE PEN NEEDLE 32 gauge x 5/32" Ndle, Use to inject insulin 5 times daily., Disp: 100 each, Rfl: 11    blood sugar diagnostic (BLOOD GLUCOSE TEST) Strp, To check blood sugar QID, Disp: 400 each, Rfl: 3    blood-glucose meter (TRUE METRIX GLUCOSE METER MISC), by Misc.(Non-Drug; Combo Route) route. Use as directed, Disp: , Rfl:     cholecalciferol, vitamin D3, 125 mcg (5,000 unit) Tab, Take 5,000 Units by mouth once daily., Disp: , Rfl:     ciprofloxacin-dexamethasone 0.3-0.1% (CIPRODEX) 0.3-0.1 % DrpS, Place 3 drops into both ears 2 (two) times daily., Disp: 7.5 mL, Rfl: 0    dorzolamide-timolol 2-0.5% (COSOPT) 22.3-6.8 mg/mL ophthalmic solution, instill one drop in affected eye twice a day., Disp: , Rfl:     flash glucose scanning reader (FREESTYLE CARLOS 2 READER) [facility] [place], Use as directed., Disp: 1 each, Rfl: 0    flash glucose sensor (FREESTYLE CARLOS 2 SENSOR) Kit, Apply sensor every 14 days as directed., Disp: 6 kit, Rfl: 3    fluticasone propionate (FLONASE) 50 mcg/actuation nasal spray, 2 sprays by Each Nostril route once daily., Disp: , Rfl:     glucagon HCL (GLUCAGON, HCL, EMERGENCY KIT) 1 mg SolR, Inject as directed. Use as directed for hypoglycemia, Disp: , Rfl:     hydrOXYzine pamoate (VISTARIL) 25 MG Cap, Take 25 mg by mouth 3 (three) times daily as needed., Disp: , Rfl:     insulin (BASAGLAR KWIKPEN U-100 INSULIN) glargine 100 units/mL (3mL) SubQ pen, Inject 55 units sq every morning and 55 units sq every evening, Disp: 90 mL, Rfl: 3    insulin aspart U-100 (NOVOLOG FLEXPEN U-100 INSULIN) 100 unit/mL (3 mL) InPn pen, Inject sq following sliding scale not to exceed 50 units daily, Disp: 45 mL, Rfl: 1    magnesium gluconate (MAG-G) 27 mg magnesium (500 mg) Tab tablet, Take 27 mg by mouth every evening., Disp: , Rfl:     metoclopramide HCl (REGLAN) 10 MG tablet, Take 10 mg by mouth every 6 (six) " hours as needed., Disp: , Rfl:     metoprolol succinate (TOPROL-XL) 25 MG 24 hr tablet, Take 25 mg by mouth once daily., Disp: , Rfl:     ondansetron (ZOFRAN-ODT) 8 MG TbDL, ondansetron 8 mg disintegrating tablet  DISSOLVE ONE TABLET ON TONGUE TWICE DAILY AS NEEDED, Disp: , Rfl:     pantoprazole (PROTONIX) 40 MG tablet, Take 40 mg by mouth once daily., Disp: , Rfl:     pravastatin (PRAVACHOL) 40 MG tablet, Take 40 mg by mouth every evening. Pt states she takes 2 tablest daily, Disp: , Rfl:     promethazine (PHENERGAN) 25 MG tablet, Take 25 mg by mouth every 4 (four) hours. One tablet every 8 hours prn for nausea or headache, no more than 2 in a day or 3 days a week, no driving when taking this medication., Disp: , Rfl:     UBROGEPANT 100 mg tablet, TAKE ONE TABLET BY MOUTH AT ONSET of migraine, MAY REPEAT DOSE AFTER TWO hours. no more THAN TWO doses in APPLY 24 hour period, Disp: , Rfl:     Review of Systems   Constitutional: Negative for fatigue and fever.   Eyes: Negative for pain.   Respiratory: Negative for chest tightness and shortness of breath.    Cardiovascular: Positive for leg swelling. Negative for chest pain.   Gastrointestinal: Negative for abdominal pain.   Musculoskeletal: Positive for leg pain.        Chronic darker skin changes bilaterally   Neurological: Negative for syncope.   Psychiatric/Behavioral: Negative for sleep disturbance.          II. PHYSICAL EXAM     Physical Exam  Vitals reviewed.   Constitutional:       General: She is not in acute distress.     Appearance: She is obese.   HENT:      Head: Normocephalic.   Eyes:      Pupils: Pupils are equal, round, and reactive to light.   Cardiovascular:      Rate and Rhythm: Normal rate and regular rhythm.      Comments: Palpable pulses both feet  Pulmonary:      Effort: Pulmonary effort is normal.   Abdominal:      Palpations: Abdomen is soft.   Musculoskeletal:         General: No swelling. Normal range of motion.      Cervical back: Neck  supple.   Skin:     General: Skin is warm and dry.   Neurological:      Mental Status: She is alert and oriented to person, place, and time.         CEAP Classification     Venous Clinical Severity Score     III. ASSESSMENT & PLAN (MEDICAL DECISION MAKING)     1. Edema of both lower extremities    2. Type 1 diabetes mellitus without complication    3. Pain in both lower extremities    4. Hyperpigmentation of skin        Assessment/Diagnosis and Plan:  Patient has complaints, symptoms and physical exam findings of chronic venous disease.  Therefore, I will order a bilateral complete venous reflux study and see the patient back with results. Study negative for reflux and negative for DVT. No venous issues found. Thorough discussion of test findings and plan of care; she verbalized understanding and agrees with plan.     Recommend she start wearing 20-30 mmHg compression , discussed therapeutic leg elevation and calf pumping exercises. She doesn;t want to purchase compression today but says she may come back to pick them up at a later date.   FU with PCP .   FU with our clinic prn.             STEPHANIE Dsouza

## 2022-02-09 RX ORDER — INSULIN ASPART 100 [IU]/ML
INJECTION, SOLUTION INTRAVENOUS; SUBCUTANEOUS
Qty: 45 ML | Refills: 3 | Status: SHIPPED | OUTPATIENT
Start: 2022-02-09 | End: 2022-04-28 | Stop reason: SDUPTHER

## 2022-02-09 RX ORDER — INSULIN GLARGINE 100 [IU]/ML
INJECTION, SOLUTION SUBCUTANEOUS
Qty: 90 ML | Refills: 3 | Status: SHIPPED | OUTPATIENT
Start: 2022-02-09 | End: 2022-04-28 | Stop reason: SDUPTHER

## 2022-04-13 ENCOUNTER — OFFICE VISIT (OUTPATIENT)
Dept: PULMONOLOGY | Facility: CLINIC | Age: 52
End: 2022-04-13
Payer: OTHER GOVERNMENT

## 2022-04-13 VITALS
HEART RATE: 66 BPM | BODY MASS INDEX: 33.75 KG/M2 | DIASTOLIC BLOOD PRESSURE: 70 MMHG | RESPIRATION RATE: 16 BRPM | OXYGEN SATURATION: 98 % | WEIGHT: 210 LBS | HEIGHT: 66 IN | SYSTOLIC BLOOD PRESSURE: 126 MMHG

## 2022-04-13 DIAGNOSIS — R06.2 WHEEZING: Chronic | ICD-10-CM

## 2022-04-13 DIAGNOSIS — R06.2 WHEEZING: Primary | Chronic | ICD-10-CM

## 2022-04-13 DIAGNOSIS — E66.9 CLASS 1 OBESITY WITH BODY MASS INDEX (BMI) OF 33.0 TO 33.9 IN ADULT, UNSPECIFIED OBESITY TYPE, UNSPECIFIED WHETHER SERIOUS COMORBIDITY PRESENT: ICD-10-CM

## 2022-04-13 PROCEDURE — 99215 OFFICE O/P EST HI 40 MIN: CPT | Mod: PBBFAC | Performed by: INTERNAL MEDICINE

## 2022-04-13 PROCEDURE — 99213 OFFICE O/P EST LOW 20 MIN: CPT | Mod: S$PBB,,, | Performed by: INTERNAL MEDICINE

## 2022-04-13 PROCEDURE — 99213 PR OFFICE/OUTPT VISIT, EST, LEVL III, 20-29 MIN: ICD-10-PCS | Mod: S$PBB,,, | Performed by: INTERNAL MEDICINE

## 2022-04-13 RX ORDER — FLUTICASONE PROPIONATE AND SALMETEROL 100; 50 UG/1; UG/1
1 POWDER RESPIRATORY (INHALATION) 2 TIMES DAILY
Qty: 60 EACH | Refills: 11 | Status: SHIPPED | OUTPATIENT
Start: 2022-04-13 | End: 2022-04-13 | Stop reason: SDUPTHER

## 2022-04-13 RX ORDER — FLUTICASONE PROPIONATE AND SALMETEROL 100; 50 UG/1; UG/1
1 POWDER RESPIRATORY (INHALATION) 2 TIMES DAILY
Qty: 180 EACH | Refills: 3 | Status: SHIPPED | OUTPATIENT
Start: 2022-04-13 | End: 2022-04-21 | Stop reason: SDUPTHER

## 2022-04-13 NOTE — PROGRESS NOTES
Subjective:       Patient ID: Talia Lawrence is a 51 y.o. female.    Chief Complaint: Wheezing (3 month follow up)    51-year-old female with history of diabetes, hypertension, CKD stage 2, gastroesophageal reflux who is referred from Dr. Gastelum for persistent wheeze.  Patient states she has had wheezing for many years that comes and goes.  She notices is both with activity and rest.  He cannot identify any aggravating or relieving factors.  She does have some associated shortness of breath.  She has a significant history of sinus issues and has had recent sinus surgery.  She has been on multiple recent courses of antibiotics and steroids for sinus troubles.  She is a lifelong nonsmoker.  She does have a significant family history of asthma in her father and her children.  She endorses a weight gain, unknown amount.  She has a history of migraines sinus difficulty with postnasal drip, 3-4 pillow orthopnea.  Denies lower extremity edema.  No significant occupational exposures.  Her has does have carpet, denies mold, no pets in the home.  She does not currently have a primary care provider, but sees the acute care clinic in [facility].  Initiated on symbicort, wheezing has improved. Notes increase in blood glucose levels. Infrequent albuterol use. Last time she filled symbicort was $70.    Follow-up  Pertinent negatives include no abdominal pain, arthralgias, chest pain, chills, congestion, coughing, fever, headaches, joint swelling, myalgias, nausea, rash, sore throat or vomiting.   Wheezing   Associated symptoms include rhinorrhea. Pertinent negatives include no abdominal pain, chest pain, chills, coughing, fever, headaches, hemoptysis, rash, shortness of breath, sore throat, sputum production or vomiting.      Social History     Tobacco Use    Smoking status: Never Smoker    Smokeless tobacco: Never Used   Substance Use Topics    Alcohol use: Yes    Drug use: Never      Review of Systems    Constitutional: Positive for weight gain. Negative for fever, chills, activity change, appetite change and night sweats.   HENT: Positive for postnasal drip, rhinorrhea and sinus pressure. Negative for sore throat, voice change and congestion.    Respiratory: Positive for wheezing and orthopnea. Negative for cough, hemoptysis, sputum production, chest tightness, shortness of breath and use of rescue inhaler.    Cardiovascular: Negative for chest pain, palpitations and leg swelling.   Musculoskeletal: Negative for arthralgias, back pain, joint swelling and myalgias.   Skin: Negative for rash.   Gastrointestinal: Positive for acid reflux. Negative for nausea, vomiting and abdominal pain.   Neurological: Negative for syncope, light-headedness and headaches.   Hematological: Negative for adenopathy. Does not bruise/bleed easily and no excessive bruising.   Psychiatric/Behavioral: Negative for confusion and sleep disturbance. The patient is not nervous/anxious.        Objective:      Physical Exam   Constitutional: She is oriented to person, place, and time. She appears well-developed. No distress. She is obese.   HENT:   Head: Normocephalic.   Nose: Nose normal.   Neck: No JVD present. No tracheal deviation present. Thyromegaly present.   Cardiovascular: Normal rate, regular rhythm and normal heart sounds.   Pulmonary/Chest: Effort normal and breath sounds normal. No respiratory distress. She has no wheezes. She has no rhonchi. She has no rales.   Abdominal: Soft. Bowel sounds are normal. There is no abdominal tenderness.   Musculoskeletal:         General: No deformity or edema.      Cervical back: Neck supple.   Lymphadenopathy: No supraclavicular adenopathy is present.     She has no cervical adenopathy.   Neurological: She is alert and oriented to person, place, and time. No cranial nerve deficit.   Skin: Skin is warm and dry. No rash noted.   Psychiatric: She has a normal mood and affect. Her behavior is normal.  "  Vitals reviewed.    Personal Diagnostic Review  Spirometry: No obstruction. Low FEV1 and FVC, possible restriction. No significant bronchodilator response.     No flowsheet data found.      Assessment:       1. Wheezing    2. Class 1 obesity with body mass index (BMI) of 33.0 to 33.9 in adult, unspecified obesity type, unspecified whether serious comorbidity present        Outpatient Encounter Medications as of 4/13/2022   Medication Sig Dispense Refill    albuterol (PROVENTIL/VENTOLIN HFA) 90 mcg/actuation inhaler Inhale 2 puffs into the lungs every 6 (six) hours as needed for Wheezing. Rescue 18 g 3    amLODIPine-benazepriL (LOTREL) 5-40 mg per capsule amlodipine 5 mg-benazepril 40 mg capsule   TAKE ONE CAPSULE BY MOUTH EVERY DAY      BD ULTRA-FINE CARLINE PEN NEEDLE 32 gauge x 5/32" Ndle Use to inject insulin 5 times daily. 100 each 11    blood sugar diagnostic (BLOOD GLUCOSE TEST) Strp To check blood sugar  each 3    blood-glucose meter (TRUE METRIX GLUCOSE METER MISC) by Misc.(Non-Drug; Combo Route) route. Use as directed      cholecalciferol, vitamin D3, 125 mcg (5,000 unit) Tab Take 5,000 Units by mouth once daily.      ciprofloxacin-dexamethasone 0.3-0.1% (CIPRODEX) 0.3-0.1 % DrpS Place 3 drops into both ears 2 (two) times daily. 7.5 mL 0    dorzolamide-timolol 2-0.5% (COSOPT) 22.3-6.8 mg/mL ophthalmic solution instill one drop in affected eye twice a day.      flash glucose scanning reader (FREESTYLE CARLOS 2 READER) [facility] Use as directed. 1 each 0    flash glucose sensor (FREESTYLE CARLOS 2 SENSOR) Kit Apply sensor every 14 days as directed. 6 kit 3    fluticasone propionate (FLONASE) 50 mcg/actuation nasal spray 2 sprays by Each Nostril route once daily.      fluticasone-salmeterol diskus inhaler 100-50 mcg Inhale 1 puff into the lungs 2 (two) times daily. Controller 60 each 11    glucagon HCL (GLUCAGON, HCL, EMERGENCY KIT) 1 mg SolR Inject as directed. Use as directed for hypoglycemia   "    hydrOXYzine pamoate (VISTARIL) 25 MG Cap Take 25 mg by mouth 3 (three) times daily as needed.      insulin (BASAGLAR KWIKPEN U-100 INSULIN) glargine 100 units/mL (3mL) SubQ pen Inject 55 units sq every morning and 55 units sq every evening 90 mL 3    insulin aspart U-100 (NOVOLOG FLEXPEN U-100 INSULIN) 100 unit/mL (3 mL) InPn pen Inject sq following sliding scale not to exceed 50 units daily 45 mL 3    magnesium gluconate (MAG-G) 27 mg magnesium (500 mg) Tab tablet Take 27 mg by mouth every evening.      metoclopramide HCl (REGLAN) 10 MG tablet Take 10 mg by mouth every 6 (six) hours as needed.      metoprolol succinate (TOPROL-XL) 25 MG 24 hr tablet Take 25 mg by mouth once daily.      ondansetron (ZOFRAN-ODT) 8 MG TbDL ondansetron 8 mg disintegrating tablet   DISSOLVE ONE TABLET ON TONGUE TWICE DAILY AS NEEDED      pantoprazole (PROTONIX) 40 MG tablet Take 40 mg by mouth once daily.      pravastatin (PRAVACHOL) 40 MG tablet Take 40 mg by mouth every evening. Pt states she takes 2 tablest daily      promethazine (PHENERGAN) 25 MG tablet Take 25 mg by mouth every 4 (four) hours. One tablet every 8 hours prn for nausea or headache, no more than 2 in a day or 3 days a week, no driving when taking this medication.      UBROGEPANT 100 mg tablet TAKE ONE TABLET BY MOUTH AT ONSET of migraine, MAY REPEAT DOSE AFTER TWO hours. no more THAN TWO doses in APPLY 24 hour period       No facility-administered encounter medications on file as of 4/13/2022.     No orders of the defined types were placed in this encounter.      Plan:       Wheezing  - possible mild asthma/RAD  - spirometry pre/post, no significant bronchodilator response. No obstruction. Low FEV1 and FVC, possible restriction.   - continue albuterol PRN, change symbicort to low dose advair as this is new preferred agent. Patient advised that she may discontinue if no noted difference  - encouraged weight loss    RTC PRN

## 2022-04-21 DIAGNOSIS — R06.2 WHEEZING: ICD-10-CM

## 2022-04-21 RX ORDER — FLUTICASONE PROPIONATE AND SALMETEROL 100; 50 UG/1; UG/1
1 POWDER RESPIRATORY (INHALATION) 2 TIMES DAILY
Qty: 180 EACH | Refills: 3 | Status: SHIPPED | OUTPATIENT
Start: 2022-04-21 | End: 2023-04-21

## 2022-04-21 RX ORDER — ALBUTEROL SULFATE 90 UG/1
2 AEROSOL, METERED RESPIRATORY (INHALATION) EVERY 6 HOURS PRN
Qty: 18 G | Refills: 3 | Status: SHIPPED | OUTPATIENT
Start: 2022-04-21

## 2022-04-28 ENCOUNTER — OFFICE VISIT (OUTPATIENT)
Dept: DIABETES SERVICES | Facility: CLINIC | Age: 52
End: 2022-04-28
Payer: OTHER GOVERNMENT

## 2022-04-28 ENCOUNTER — LAB VISIT (OUTPATIENT)
Dept: LAB | Facility: CLINIC | Age: 52
End: 2022-04-28
Payer: OTHER GOVERNMENT

## 2022-04-28 VITALS
OXYGEN SATURATION: 98 % | RESPIRATION RATE: 16 BRPM | SYSTOLIC BLOOD PRESSURE: 130 MMHG | HEART RATE: 72 BPM | HEIGHT: 66 IN | DIASTOLIC BLOOD PRESSURE: 70 MMHG | BODY MASS INDEX: 33.59 KG/M2 | WEIGHT: 209 LBS

## 2022-04-28 DIAGNOSIS — E10.69 TYPE 1 DIABETES MELLITUS WITH OTHER SPECIFIED COMPLICATION: Primary | ICD-10-CM

## 2022-04-28 DIAGNOSIS — E10.9 TYPE 1 DIABETES MELLITUS WITHOUT COMPLICATION: Primary | ICD-10-CM

## 2022-04-28 LAB
ALBUMIN SERPL BCP-MCNC: 3.6 G/DL (ref 3.5–5)
ALBUMIN/GLOB SERPL: 1 {RATIO}
ALP SERPL-CCNC: 213 U/L (ref 41–108)
ALT SERPL W P-5'-P-CCNC: 20 U/L (ref 13–56)
ANION GAP SERPL CALCULATED.3IONS-SCNC: 11 MMOL/L (ref 7–16)
AST SERPL W P-5'-P-CCNC: 14 U/L (ref 15–37)
BILIRUB SERPL-MCNC: 0.3 MG/DL (ref 0–1.2)
BUN SERPL-MCNC: 14 MG/DL (ref 7–18)
BUN/CREAT SERPL: 12 (ref 6–20)
CALCIUM SERPL-MCNC: 8.9 MG/DL (ref 8.5–10.1)
CHLORIDE SERPL-SCNC: 100 MMOL/L (ref 98–107)
CHOLEST SERPL-MCNC: 172 MG/DL (ref 0–200)
CHOLEST/HDLC SERPL: 4.1 {RATIO}
CO2 SERPL-SCNC: 29 MMOL/L (ref 21–32)
CREAT SERPL-MCNC: 1.18 MG/DL (ref 0.55–1.02)
CREAT UR-MCNC: 132 MG/DL (ref 28–219)
GLOBULIN SER-MCNC: 3.6 G/DL (ref 2–4)
GLUCOSE SERPL-MCNC: 128 MG/DL (ref 70–110)
GLUCOSE SERPL-MCNC: 160 MG/DL (ref 74–106)
HBA1C MFR BLD: 8.1 % (ref 4.5–6.6)
HDLC SERPL-MCNC: 42 MG/DL (ref 40–60)
LDLC SERPL CALC-MCNC: 66 MG/DL
LDLC/HDLC SERPL: 1.6 {RATIO}
MICROALBUMIN UR-MCNC: 1.3 MG/DL (ref 0–2.8)
MICROALBUMIN/CREAT RATIO PNL UR: 9.8 MG/G (ref 0–30)
NONHDLC SERPL-MCNC: 130 MG/DL
POTASSIUM SERPL-SCNC: 4.6 MMOL/L (ref 3.5–5.1)
PROT SERPL-MCNC: 7.2 G/DL (ref 6.4–8.2)
SODIUM SERPL-SCNC: 135 MMOL/L (ref 136–145)
TRIGL SERPL-MCNC: 321 MG/DL (ref 35–150)
VLDLC SERPL-MCNC: 64 MG/DL

## 2022-04-28 PROCEDURE — 80061 LIPID PANEL: ICD-10-PCS | Mod: ,,, | Performed by: CLINICAL MEDICAL LABORATORY

## 2022-04-28 PROCEDURE — 82043 MICROALBUMIN / CREATININE RATIO URINE: ICD-10-PCS | Mod: ,,, | Performed by: CLINICAL MEDICAL LABORATORY

## 2022-04-28 PROCEDURE — 80053 COMPREHENSIVE METABOLIC PANEL: ICD-10-PCS | Mod: ,,, | Performed by: CLINICAL MEDICAL LABORATORY

## 2022-04-28 PROCEDURE — 83036 HEMOGLOBIN GLYCOSYLATED A1C: CPT | Mod: PBBFAC | Performed by: NURSE PRACTITIONER

## 2022-04-28 PROCEDURE — 82043 UR ALBUMIN QUANTITATIVE: CPT | Mod: ,,, | Performed by: CLINICAL MEDICAL LABORATORY

## 2022-04-28 PROCEDURE — 80053 COMPREHEN METABOLIC PANEL: CPT | Mod: ,,, | Performed by: CLINICAL MEDICAL LABORATORY

## 2022-04-28 PROCEDURE — 82570 MICROALBUMIN / CREATININE RATIO URINE: ICD-10-PCS | Mod: ,,, | Performed by: CLINICAL MEDICAL LABORATORY

## 2022-04-28 PROCEDURE — 36415 PR COLLECTION VENOUS BLOOD,VENIPUNCTURE: ICD-10-PCS | Mod: ,,, | Performed by: CLINICAL MEDICAL LABORATORY

## 2022-04-28 PROCEDURE — 99214 PR OFFICE/OUTPT VISIT, EST, LEVL IV, 30-39 MIN: ICD-10-PCS | Mod: S$PBB,,, | Performed by: NURSE PRACTITIONER

## 2022-04-28 PROCEDURE — 82570 ASSAY OF URINE CREATININE: CPT | Mod: ,,, | Performed by: CLINICAL MEDICAL LABORATORY

## 2022-04-28 PROCEDURE — 80061 LIPID PANEL: CPT | Mod: ,,, | Performed by: CLINICAL MEDICAL LABORATORY

## 2022-04-28 PROCEDURE — 99214 OFFICE O/P EST MOD 30 MIN: CPT | Mod: S$PBB,,, | Performed by: NURSE PRACTITIONER

## 2022-04-28 PROCEDURE — 36415 COLL VENOUS BLD VENIPUNCTURE: CPT | Mod: ,,, | Performed by: CLINICAL MEDICAL LABORATORY

## 2022-04-28 PROCEDURE — 99214 OFFICE O/P EST MOD 30 MIN: CPT | Mod: PBBFAC | Performed by: NURSE PRACTITIONER

## 2022-04-28 PROCEDURE — 82962 GLUCOSE BLOOD TEST: CPT | Mod: PBBFAC | Performed by: NURSE PRACTITIONER

## 2022-04-28 RX ORDER — INSULIN ASPART 100 [IU]/ML
INJECTION, SOLUTION INTRAVENOUS; SUBCUTANEOUS
Qty: 45 ML | Refills: 3 | Status: SHIPPED | OUTPATIENT
Start: 2022-04-28 | End: 2023-06-13 | Stop reason: SDUPTHER

## 2022-04-28 RX ORDER — AMLODIPINE AND BENAZEPRIL HYDROCHLORIDE 5; 40 MG/1; MG/1
CAPSULE ORAL
Qty: 90 CAPSULE | Refills: 3 | Status: SHIPPED | OUTPATIENT
Start: 2022-04-28 | End: 2022-05-10 | Stop reason: SDUPTHER

## 2022-04-28 RX ORDER — PEN NEEDLE, DIABETIC 31 GX5/16"
NEEDLE, DISPOSABLE MISCELLANEOUS
Qty: 400 EACH | Refills: 3 | Status: SHIPPED | OUTPATIENT
Start: 2022-04-28 | End: 2022-05-10 | Stop reason: SDUPTHER

## 2022-04-28 RX ORDER — INSULIN GLARGINE 100 [IU]/ML
INJECTION, SOLUTION SUBCUTANEOUS
Qty: 90 ML | Refills: 3 | Status: SHIPPED | OUTPATIENT
Start: 2022-04-28 | End: 2022-11-02

## 2022-04-28 RX ORDER — PRAVASTATIN SODIUM 40 MG/1
40 TABLET ORAL NIGHTLY
Qty: 90 TABLET | Refills: 3 | Status: SHIPPED | OUTPATIENT
Start: 2022-04-28 | End: 2022-05-05

## 2022-04-28 NOTE — PATIENT INSTRUCTIONS
Snacks with 0-5 grams carbs   Hard Boiled Egg   Crystal Light, Vitamin Water, Powerade Zero   Herbal tea, unsweetened   8 oz unsweetened almond milk   2 tsp peanut butter on celery   ½ cup sugar-free Jell-O   1 sugar-free popsicle   Non starchy vegetables such as carrots or celery sticks with lowfat dressing   ½ oz lowfat cheese or string cheese   1 closed handful of nuts or tbsp of seeds, unsalted    Snacks with 15 gram carbs  . 1 small piece of fruit or . banana or . cup light canned fruit  . 3 lara cracker squares  . 3 cups popcorn  . 5 Vanilla Wafers  . 1/2 cup low fat, no added sugar ice cream or frozen yogurt  . 1/2 turkey, ham, or chicken sandwich  . 1.2 cup fruit with 1/2 cup of cottage cheese  . 4-6 unsalted wheat crackers with 1 oz low fat cheese or 1 tbsp peanut butter  . 30 goldfish crackers  . 7-8 mini rice cakes  . 1/3 cup hummus dip with raw vegetables  . 1/2 whole wheat adriana, 1 tbsp hummus  . Mini pizza (. whole wheat English muffin, low-fat cheese, tomato sauce)  . 100 calorie snack pack  . 4-6 oz light yogurt  . 1/2 cup sugar-free pudding

## 2022-04-28 NOTE — PROGRESS NOTES
Subjective:       Patient ID: Talia Lawrence is a 51 y.o. female.    Chief Complaint: Diabetes Mellitus (Follow up diabetes. Pt has freestyle afia 2 and checks glucose 3 or more times daily.)    Here today for routine evaluation and med refill. A1c is up from 7.3 to 8.1.  She is complaining of epigastric squeezing pain for about a year off and on and now has midsternal chest pain that radiates to right shoulder intermittently.  Does have dyspnea upon exertion.  We will refer to cardiology. Father has cardiomegaly secondary to uncontrolled HTN, mother had MI that caused sudden death at the age of 64.     Unable to download the FSL from her phone at this time.  The 90 days very high time was 10%, high27%, targer 61%, low 2%    Review of Systems   Constitutional: Negative for activity change, appetite change, diaphoresis and fatigue.   HENT: Negative for nasal congestion, facial swelling and sinus pressure/congestion.    Eyes: Negative for visual disturbance.   Respiratory: Positive for shortness of breath. Negative for wheezing.    Cardiovascular: Positive for chest pain. Negative for leg swelling.   Gastrointestinal: Negative for constipation, diarrhea, nausea and vomiting.   Endocrine: Negative for polydipsia, polyphagia and polyuria.   Genitourinary: Negative for dysuria, frequency and urgency.   Musculoskeletal: Negative for gait problem and myalgias.   Integumentary:  Negative for color change, rash and wound.   Neurological: Negative for dizziness, syncope, weakness, headaches, disturbances in coordination and coordination difficulties.   Hematological: Does not bruise/bleed easily.   Psychiatric/Behavioral: Negative for self-injury, sleep disturbance and suicidal ideas. The patient is not nervous/anxious.          Objective:      Physical Exam  Vitals and nursing note reviewed.   Constitutional:       Appearance: Normal appearance.   HENT:      Head: Normocephalic.   Cardiovascular:      Rate and Rhythm:  Normal rate.   Pulmonary:      Effort: Pulmonary effort is normal.   Musculoskeletal:         General: Normal range of motion.   Skin:     General: Skin is warm and dry.   Neurological:      General: No focal deficit present.      Mental Status: She is alert and oriented to person, place, and time.   Psychiatric:         Mood and Affect: Mood normal.         Behavior: Behavior normal.         Thought Content: Thought content normal.         Judgment: Judgment normal.         Assessment:             Problem List Items Addressed This Visit          Endocrine    Diabetes mellitus type I - Primary    Relevant Medications    insulin aspart U-100 (NOVOLOG FLEXPEN U-100 INSULIN) 100 unit/mL (3 mL) InPn pen    insulin (BASAGLAR KWIKPEN U-100 INSULIN) glargine 100 units/mL (3mL) SubQ pen    Other Relevant Orders    POCT Glucose, Hand-Held Device (Completed)    Hemoglobin A1C, POCT (Completed)    Comprehensive Metabolic Panel (Completed)    Lipid Panel (Completed)    Microalbumin/Creatinine Ratio, Urine (Completed)    Ambulatory referral/consult to Cardiology       Plan:         Problem List Items Addressed This Visit          Endocrine    Diabetes mellitus type I - Primary    Relevant Medications    insulin aspart U-100 (NOVOLOG FLEXPEN U-100 INSULIN) 100 unit/mL (3 mL) InPn pen    insulin (BASAGLAR KWIKPEN U-100 INSULIN) glargine 100 units/mL (3mL) SubQ pen    Other Relevant Orders    POCT Glucose, Hand-Held Device (Completed)    Hemoglobin A1C, POCT (Completed)    Comprehensive Metabolic Panel (Completed)    Lipid Panel (Completed)    Microalbumin/Creatinine Ratio, Urine (Completed)    Ambulatory referral/consult to Cardiology            Do better with insulin use.  Increase supper sliding scale by 2 units.

## 2022-05-10 ENCOUNTER — PATIENT MESSAGE (OUTPATIENT)
Dept: DIABETES SERVICES | Facility: CLINIC | Age: 52
End: 2022-05-10
Payer: OTHER GOVERNMENT

## 2022-05-10 DIAGNOSIS — E11.9 TYPE 2 DIABETES MELLITUS WITHOUT COMPLICATION, WITH LONG-TERM CURRENT USE OF INSULIN: ICD-10-CM

## 2022-05-10 DIAGNOSIS — Z79.4 TYPE 2 DIABETES MELLITUS WITHOUT COMPLICATION, WITH LONG-TERM CURRENT USE OF INSULIN: ICD-10-CM

## 2022-05-10 DIAGNOSIS — E10.69 TYPE 1 DIABETES MELLITUS WITH OTHER SPECIFIED COMPLICATION: Primary | ICD-10-CM

## 2022-05-10 RX ORDER — PRAVASTATIN SODIUM 40 MG/1
40 TABLET ORAL NIGHTLY
Qty: 90 TABLET | Refills: 3 | Status: ON HOLD | OUTPATIENT
Start: 2022-05-10 | End: 2022-11-16 | Stop reason: HOSPADM

## 2022-05-10 RX ORDER — AMLODIPINE AND BENAZEPRIL HYDROCHLORIDE 5; 40 MG/1; MG/1
CAPSULE ORAL
Qty: 90 CAPSULE | Refills: 3 | Status: SHIPPED | OUTPATIENT
Start: 2022-05-10 | End: 2022-12-19 | Stop reason: SDUPTHER

## 2022-05-10 RX ORDER — PEN NEEDLE, DIABETIC 31 GX5/16"
NEEDLE, DISPOSABLE MISCELLANEOUS
Qty: 400 EACH | Refills: 3 | Status: SHIPPED | OUTPATIENT
Start: 2022-05-10 | End: 2023-06-13 | Stop reason: SDUPTHER

## 2022-05-16 ENCOUNTER — PATIENT MESSAGE (OUTPATIENT)
Dept: DIABETES SERVICES | Facility: CLINIC | Age: 52
End: 2022-05-16
Payer: OTHER GOVERNMENT

## 2022-05-16 RX ORDER — METOPROLOL SUCCINATE 25 MG/1
25 TABLET, EXTENDED RELEASE ORAL DAILY
Qty: 90 TABLET | Refills: 3 | Status: ON HOLD | OUTPATIENT
Start: 2022-05-16 | End: 2022-11-16 | Stop reason: SDUPTHER

## 2022-05-23 ENCOUNTER — PATIENT MESSAGE (OUTPATIENT)
Dept: DIABETES SERVICES | Facility: CLINIC | Age: 52
End: 2022-05-23
Payer: OTHER GOVERNMENT

## 2022-06-29 ENCOUNTER — TELEPHONE (OUTPATIENT)
Dept: DIABETES SERVICES | Facility: CLINIC | Age: 52
End: 2022-06-29
Payer: OTHER GOVERNMENT

## 2022-06-29 ENCOUNTER — PATIENT MESSAGE (OUTPATIENT)
Dept: DIABETES SERVICES | Facility: CLINIC | Age: 52
End: 2022-06-29
Payer: OTHER GOVERNMENT

## 2022-06-29 NOTE — TELEPHONE ENCOUNTER
----- Message from Tana Carmona sent at 6/28/2022 12:23 PM CDT -----  Patient request a refill of protonix sent to [facility] by mail

## 2022-07-30 ENCOUNTER — OFFICE VISIT (OUTPATIENT)
Dept: FAMILY MEDICINE | Facility: CLINIC | Age: 52
End: 2022-07-30
Payer: OTHER GOVERNMENT

## 2022-07-30 VITALS
DIASTOLIC BLOOD PRESSURE: 69 MMHG | HEART RATE: 68 BPM | RESPIRATION RATE: 16 BRPM | SYSTOLIC BLOOD PRESSURE: 152 MMHG | WEIGHT: 212 LBS | TEMPERATURE: 98 F | OXYGEN SATURATION: 99 % | HEIGHT: 66 IN | BODY MASS INDEX: 34.07 KG/M2

## 2022-07-30 DIAGNOSIS — Z20.822 COVID-19 RULED OUT BY LABORATORY TESTING: ICD-10-CM

## 2022-07-30 DIAGNOSIS — Z11.52 ENCOUNTER FOR SCREENING FOR COVID-19: Primary | ICD-10-CM

## 2022-07-30 LAB
CTP QC/QA: YES
SARS-COV-2 AG RESP QL IA.RAPID: NEGATIVE

## 2022-07-30 PROCEDURE — 99213 PR OFFICE/OUTPT VISIT, EST, LEVL III, 20-29 MIN: ICD-10-PCS | Mod: ,,, | Performed by: FAMILY MEDICINE

## 2022-07-30 PROCEDURE — 87426 SARSCOV CORONAVIRUS AG IA: CPT | Mod: QW,,, | Performed by: FAMILY MEDICINE

## 2022-07-30 PROCEDURE — 87426 SARS CORONAVIRUS 2 ANTIGEN POCT: ICD-10-PCS | Mod: QW,,, | Performed by: FAMILY MEDICINE

## 2022-07-30 PROCEDURE — 99051 PR MEDICAL SERVICES, EVE/WKEND/HOLIDAY: ICD-10-PCS | Mod: ,,, | Performed by: FAMILY MEDICINE

## 2022-07-30 PROCEDURE — 99213 OFFICE O/P EST LOW 20 MIN: CPT | Mod: ,,, | Performed by: FAMILY MEDICINE

## 2022-07-30 PROCEDURE — 99051 MED SERV EVE/WKEND/HOLIDAY: CPT | Mod: ,,, | Performed by: FAMILY MEDICINE

## 2022-07-30 NOTE — PROGRESS NOTES
Subjective:       Patient ID: Talia Lawrence is a 52 y.o. female.    Chief Complaint: No chief complaint on file.    HPI  Review of Systems      Objective:      Physical Exam    Assessment:       Problem List Items Addressed This Visit    None     Visit Diagnoses     Encounter for screening for COVID-19    -  Primary    Relevant Orders    SARS Coronavirus 2 Antigen, POCT          Plan:

## 2022-07-30 NOTE — PROGRESS NOTES
Subjective:       Patient ID: Talia Lawrence is a 52 y.o. female.    Chief Complaint: No chief complaint on file.    Mild headache but no other COVID symptoms.  She says she has migraines.  No fever cough for sinus congestion.    Review of Systems      Objective:      Physical Exam  Constitutional:       General: She is not in acute distress.     Appearance: She is not ill-appearing.   HENT:      Nose: No congestion or rhinorrhea.      Mouth/Throat:      Pharynx: No posterior oropharyngeal erythema.   Cardiovascular:      Rate and Rhythm: Normal rate and regular rhythm.   Pulmonary:      Effort: Pulmonary effort is normal.      Breath sounds: Normal breath sounds.   Neurological:      Mental Status: She is alert.         Assessment:       Problem List Items Addressed This Visit    None     Visit Diagnoses     Encounter for screening for COVID-19    -  Primary    Relevant Orders    SARS Coronavirus 2 Antigen, POCT (Completed)    COVID-19 ruled out by laboratory testing              Plan:         COVID negative.  Retest p.r.n.

## 2022-08-01 ENCOUNTER — PATIENT MESSAGE (OUTPATIENT)
Dept: DIABETES SERVICES | Facility: CLINIC | Age: 52
End: 2022-08-01
Payer: OTHER GOVERNMENT

## 2022-08-02 ENCOUNTER — OFFICE VISIT (OUTPATIENT)
Dept: DIABETES SERVICES | Facility: CLINIC | Age: 52
End: 2022-08-02
Payer: OTHER GOVERNMENT

## 2022-08-02 VITALS
OXYGEN SATURATION: 99 % | SYSTOLIC BLOOD PRESSURE: 136 MMHG | HEART RATE: 75 BPM | HEIGHT: 66 IN | DIASTOLIC BLOOD PRESSURE: 76 MMHG | WEIGHT: 210.81 LBS | BODY MASS INDEX: 33.88 KG/M2 | RESPIRATION RATE: 16 BRPM

## 2022-08-02 DIAGNOSIS — E11.22 STAGE 2 CHRONIC KIDNEY DISEASE DUE TO TYPE 2 DIABETES MELLITUS: ICD-10-CM

## 2022-08-02 DIAGNOSIS — E10.69 TYPE 1 DIABETES MELLITUS WITH OTHER SPECIFIED COMPLICATION: Primary | ICD-10-CM

## 2022-08-02 DIAGNOSIS — I10 PRIMARY HYPERTENSION: ICD-10-CM

## 2022-08-02 DIAGNOSIS — N18.2 STAGE 2 CHRONIC KIDNEY DISEASE DUE TO TYPE 2 DIABETES MELLITUS: ICD-10-CM

## 2022-08-02 DIAGNOSIS — E11.3559 STABLE PROLIFERATIVE DIABETIC RETINOPATHY ASSOCIATED WITH TYPE 2 DIABETES MELLITUS, UNSPECIFIED LATERALITY: ICD-10-CM

## 2022-08-02 DIAGNOSIS — E78.5 HYPERLIPIDEMIA, UNSPECIFIED HYPERLIPIDEMIA TYPE: ICD-10-CM

## 2022-08-02 PROBLEM — Z79.4 TYPE 2 DIABETES MELLITUS WITHOUT COMPLICATION, WITH LONG-TERM CURRENT USE OF INSULIN: Status: ACTIVE | Noted: 2022-08-02

## 2022-08-02 PROBLEM — E11.9 TYPE 2 DIABETES MELLITUS WITHOUT COMPLICATION, WITH LONG-TERM CURRENT USE OF INSULIN: Status: ACTIVE | Noted: 2022-08-02

## 2022-08-02 LAB
GLUCOSE SERPL-MCNC: 127 MG/DL (ref 70–110)
HBA1C MFR BLD: 8.1 % (ref 4.5–6.6)

## 2022-08-02 PROCEDURE — 99215 OFFICE O/P EST HI 40 MIN: CPT | Mod: PBBFAC | Performed by: NURSE PRACTITIONER

## 2022-08-02 PROCEDURE — 82962 GLUCOSE BLOOD TEST: CPT | Mod: PBBFAC | Performed by: NURSE PRACTITIONER

## 2022-08-02 PROCEDURE — 99214 OFFICE O/P EST MOD 30 MIN: CPT | Mod: S$PBB,,, | Performed by: NURSE PRACTITIONER

## 2022-08-02 PROCEDURE — 83036 HEMOGLOBIN GLYCOSYLATED A1C: CPT | Mod: PBBFAC | Performed by: NURSE PRACTITIONER

## 2022-08-02 PROCEDURE — 99214 PR OFFICE/OUTPT VISIT, EST, LEVL IV, 30-39 MIN: ICD-10-PCS | Mod: S$PBB,,, | Performed by: NURSE PRACTITIONER

## 2022-08-02 NOTE — PROGRESS NOTES
Subjective:       Patient ID: Talia Lawrence is a 52 y.o. female.    Chief Complaint: Diabetes Mellitus (Pt here for follow up visit and A1c, reports her sugars have been up and down, monitors sugar with Freestyle Aline.)    Here today for reevaluation.  She admits that she alters dose of insulin at times.    Hemoglobin A1C       Date                     Value               Ref Range           Status                08/02/2022               8.1 (A)             4.5 - 6.6 %         Final                 04/28/2022               8.1 (A)             4.5 - 6.6 %         Final                 01/25/2022               7.3 (A)             4.5 - 6.6 %         Final                 04/22/2021               7.9                 %                   Final            ----------  Lab Results       Component                Value               Date                       MICROALBUR               1.3                 04/28/2022            Lab Results       Component                Value               Date                       CHOL                     172                 04/28/2022                 CHOL                     170                 04/22/2021                 CHOL                     155                 10/22/2020            Lab Results       Component                Value               Date                       HDL                      42                  04/28/2022                 HDL                      43                  04/22/2021                 HDL                      53                  10/22/2020            Lab Results       Component                Value               Date                       LDLCALC                  66                  04/28/2022                 LDLCALC                  96                  04/22/2021                 LDLCALC                  85                  10/22/2020            Lab Results       Component                Value               Date                       TRIG                      321 (H)             04/28/2022                 TRIG                     157 (H)             04/22/2021                 TRIG                     83                  10/22/2020            Lab Results       Component                Value               Date                       CHOLHDL                  4.1                 04/28/2022                 CHOLHDL                  4.0                 04/22/2021                 CHOLHDL                  2.9                 10/22/2020            CMP  Sodium       Date                     Value               Ref Range           Status                04/28/2022               135 (L)             136 - 145 mmol*     Final            ----------  Potassium       Date                     Value               Ref Range           Status                04/28/2022               4.6                 3.5 - 5.1 mmol*     Final            ----------  Chloride       Date                     Value               Ref Range           Status                04/28/2022               100                 98 - 107 mmol/L     Final            ----------  CO2       Date                     Value               Ref Range           Status                04/28/2022               29                  21 - 32 mmol/L      Final            ----------  Glucose       Date                     Value               Ref Range           Status                04/28/2022               160 (H)             74 - 106 mg/dL      Final            ----------  BUN       Date                     Value               Ref Range           Status                04/28/2022               14                  7 - 18 mg/dL        Final            ----------  Creatinine       Date                     Value               Ref Range           Status                04/28/2022               1.18 (H)            0.55 - 1.02 mg*     Final            ----------  Calcium       Date                     Value               Ref Range           Status                 04/28/2022               8.9                 8.5 - 10.1 mg/*     Final            ----------  Total Protein       Date                     Value               Ref Range           Status                04/28/2022               7.2                 6.4 - 8.2 g/dL      Final            ----------  Albumin       Date                     Value               Ref Range           Status                04/28/2022               3.6                 3.5 - 5.0 g/dL      Final            ----------  Bilirubin, Total       Date                     Value               Ref Range           Status                04/28/2022               0.3                 0.0 - 1.2 mg/dL     Final            ----------  Alk Phos       Date                     Value               Ref Range           Status                04/28/2022               213 (H)             41 - 108 U/L        Final            ----------  AST       Date                     Value               Ref Range           Status                04/28/2022               14 (L)              15 - 37 U/L         Final            ----------  ALT       Date                     Value               Ref Range           Status                04/28/2022               20                  13 - 56 U/L         Final            ----------  Anion Gap       Date                     Value               Ref Range           Status                04/28/2022               11                  7 - 16 mmol/L       Final            ----------  eGFR        Date                     Value               Ref Range           Status                04/22/2021               45 (L)              >=60 mL/min/1.*     Final            ----------  eGFR       Date                     Value               Ref Range           Status                04/28/2022               52 (L)              >=60 mL/min/1.*     Final            ----------      Review of Systems   Constitutional: Negative for activity change,  appetite change, diaphoresis and fatigue.   HENT: Negative for nasal congestion, facial swelling and sinus pressure/congestion.    Eyes: Negative for visual disturbance.   Respiratory: Positive for shortness of breath. Negative for wheezing.    Cardiovascular: Positive for chest pain. Negative for leg swelling.   Gastrointestinal: Negative for constipation, diarrhea, nausea and vomiting.   Endocrine: Negative for polydipsia, polyphagia and polyuria.   Genitourinary: Negative for dysuria, frequency and urgency.   Musculoskeletal: Negative for gait problem and myalgias.   Integumentary:  Negative for color change, rash and wound.   Neurological: Negative for dizziness, syncope, weakness, headaches, disturbances in coordination and coordination difficulties.   Hematological: Does not bruise/bleed easily.   Psychiatric/Behavioral: Negative for self-injury, sleep disturbance and suicidal ideas. The patient is not nervous/anxious.          Objective:      Physical Exam  Vitals and nursing note reviewed.   Constitutional:       Appearance: Normal appearance.   HENT:      Head: Normocephalic.   Cardiovascular:      Rate and Rhythm: Normal rate and regular rhythm.      Heart sounds: Normal heart sounds.   Pulmonary:      Effort: Pulmonary effort is normal.      Breath sounds: Normal breath sounds.   Musculoskeletal:         General: Normal range of motion.   Skin:     General: Skin is warm and dry.   Neurological:      General: No focal deficit present.      Mental Status: She is alert and oriented to person, place, and time.   Psychiatric:         Mood and Affect: Mood normal.         Behavior: Behavior normal.         Thought Content: Thought content normal.         Judgment: Judgment normal.         Assessment:       Problem List Items Addressed This Visit        Ophtho    Proliferative diabetic retinopathy       Cardiac/Vascular    HTN (hypertension)    Hyperlipidemia       Renal/    Stage 2 chronic kidney disease due  to type 2 diabetes mellitus       Endocrine    Diabetes mellitus type I - Primary    Relevant Orders    Hemoglobin A1C, POCT (Completed)    POCT Glucose, Hand-Held Device (Completed)          Plan:         Problem List Items Addressed This Visit        Ophtho    Proliferative diabetic retinopathy       Cardiac/Vascular    HTN (hypertension)    Hyperlipidemia       Renal/    Stage 2 chronic kidney disease due to type 2 diabetes mellitus       Endocrine    Diabetes mellitus type I - Primary    Relevant Orders    Hemoglobin A1C, POCT (Completed)    POCT Glucose, Hand-Held Device (Completed)        Ensure to take all injections as you should.  Use [employer] and accept share capability.

## 2022-08-02 NOTE — PATIENT INSTRUCTIONS
Ensure to take insulin as directed.     Check glucose before all meals and follow sliding scale.      Check glucose also before bed and have a 15 carb snack that has protein 8-10grams preferably.     Bring you meter with you to all appointments. Snacks with 0-5 grams carbs   Hard Boiled Egg   Crystal Light, Vitamin Water, Powerade Zero   Herbal tea, unsweetened   8 oz unsweetened almond milk   2 tsp peanut butter on celery   ½ cup sugar-free Jell-O   1 sugar-free popsicle   Non starchy vegetables such as carrots or celery sticks with lowfat dressing   ½ oz lowfat cheese or string cheese   1 closed handful of nuts or tbsp of seeds, unsalted    Snacks with 15 gram carbs  . 1 small piece of fruit or . banana or . cup light canned fruit  . 3 lara cracker squares  . 3 cups popcorn  . 5 Vanilla Wafers  . 1/2 cup low fat, no added sugar ice cream or frozen yogurt  . 1/2 turkey, ham, or chicken sandwich  . 1.2 cup fruit with 1/2 cup of cottage cheese  . 4-6 unsalted wheat crackers with 1 oz low fat cheese or 1 tbsp peanut butter  . 30 goldfish crackers  . 7-8 mini rice cakes  . 1/3 cup hummus dip with raw vegetables  . 1/2 whole wheat adriana, 1 tbsp hummus  . Mini pizza (. whole wheat English muffin, low-fat cheese, tomato sauce)  . 100 calorie snack pack  . 4-6 oz light yogurt  . 1/2 cup sugar-free pudding

## 2022-08-10 ENCOUNTER — OFFICE VISIT (OUTPATIENT)
Dept: CARDIOLOGY | Facility: CLINIC | Age: 52
End: 2022-08-10
Payer: OTHER GOVERNMENT

## 2022-08-10 VITALS
HEIGHT: 66 IN | BODY MASS INDEX: 34.07 KG/M2 | RESPIRATION RATE: 18 BRPM | DIASTOLIC BLOOD PRESSURE: 66 MMHG | WEIGHT: 212 LBS | SYSTOLIC BLOOD PRESSURE: 142 MMHG | HEART RATE: 70 BPM

## 2022-08-10 DIAGNOSIS — R07.9 CHEST PAIN, UNSPECIFIED TYPE: Primary | ICD-10-CM

## 2022-08-10 DIAGNOSIS — Z79.4 TYPE 2 DIABETES MELLITUS WITHOUT COMPLICATION, WITH LONG-TERM CURRENT USE OF INSULIN: Chronic | ICD-10-CM

## 2022-08-10 DIAGNOSIS — R10.13 EPIGASTRIC PAIN: ICD-10-CM

## 2022-08-10 DIAGNOSIS — I10 PRIMARY HYPERTENSION: Chronic | ICD-10-CM

## 2022-08-10 DIAGNOSIS — K21.9 GASTROESOPHAGEAL REFLUX DISEASE WITHOUT ESOPHAGITIS: Chronic | ICD-10-CM

## 2022-08-10 DIAGNOSIS — K31.84 GASTROPARESIS: Chronic | ICD-10-CM

## 2022-08-10 DIAGNOSIS — E11.9 TYPE 2 DIABETES MELLITUS WITHOUT COMPLICATION, WITH LONG-TERM CURRENT USE OF INSULIN: Chronic | ICD-10-CM

## 2022-08-10 PROCEDURE — 99215 OFFICE O/P EST HI 40 MIN: CPT | Mod: PBBFAC | Performed by: INTERNAL MEDICINE

## 2022-08-10 PROCEDURE — 99203 OFFICE O/P NEW LOW 30 MIN: CPT | Mod: S$PBB,,, | Performed by: INTERNAL MEDICINE

## 2022-08-10 PROCEDURE — 93010 ELECTROCARDIOGRAM REPORT: CPT | Mod: S$PBB,,, | Performed by: INTERNAL MEDICINE

## 2022-08-10 PROCEDURE — 99203 PR OFFICE/OUTPT VISIT, NEW, LEVL III, 30-44 MIN: ICD-10-PCS | Mod: S$PBB,,, | Performed by: INTERNAL MEDICINE

## 2022-08-10 PROCEDURE — 93010 EKG 12-LEAD: ICD-10-PCS | Mod: S$PBB,,, | Performed by: INTERNAL MEDICINE

## 2022-08-10 PROCEDURE — 93005 ELECTROCARDIOGRAM TRACING: CPT | Mod: PBBFAC | Performed by: INTERNAL MEDICINE

## 2022-08-10 NOTE — PROGRESS NOTES
[facility] note        DATE OF SERVICE: 08/10/2022       PCP: Frank Cazares NP      CHIEF COMPLAINT:   Chief Complaint   Patient presents with    Consult     Referred by LIDIA Rowland for Chest Pain    Chest Pain     Pt states it feels like a muscle spasm in mid chest    Shortness of Breath     SOB with exertion at times    Fatigue    Palpitations     Comes and goes        HISTORY OF PRESENT ILLNESS:  Talia Lawrence is a 52 y.o. female with a PMH of   Past Medical History:   Diagnosis Date    CKD (chronic kidney disease), stage II     Diabetes mellitus type I     Disorder of kidney and ureter     GERD (gastroesophageal reflux disease)     Glaucoma     Goiter     HTN (hypertension)     Hyperlipidemia     Migraine headache without aura     Vitamin D deficiency      who presents for   Chief Complaint   Patient presents with    Consult     Referred by LIDIA Rowland for Chest Pain    Chest Pain     Pt states it feels like a muscle spasm in mid chest    Shortness of Breath     SOB with exertion at times    Fatigue    Palpitations     Comes and goes          Review of Systems: Review of Systems   Constitutional: Positive for malaise/fatigue.   Respiratory: Positive for shortness of breath.    Cardiovascular: Positive for chest pain and palpitations. Negative for leg swelling.   Neurological: Negative for loss of consciousness.        PAST MEDICAL HISTORY:  Past Medical History:   Diagnosis Date    CKD (chronic kidney disease), stage II     Diabetes mellitus type I     Disorder of kidney and ureter     GERD (gastroesophageal reflux disease)     Glaucoma     Goiter     HTN (hypertension)     Hyperlipidemia     Migraine headache without aura     Vitamin D deficiency        PAST SURGICAL HISTORY:  Past Surgical History:   Procedure Laterality Date    ANKLE FRACTURE SURGERY Right     ANKLE SURGERY Right     Plate and screws, fracture repair.    ANKLE SURGERY       SECTION       "times 2    COLONOSCOPY  05/22/2017    Performed by Dr. Prescott.    HYSTERECTOMY      INJECTION OF FACET JOINT Bilateral 10/25/2018    Lumbar L3-S1 performed by Dr. Summers with Pain Treatment.    MYRINGOTOMY WITH INSERTION OF VENTILATION TUBE Bilateral 7/27/2021    Procedure: MYRINGOTOMY, WITH TYMPANOSTOMY TUBE INSERTION (T-TUBES);  Surgeon: Ki Gastelum MD;  Location[facility];  Service: ENT;  Laterality: Bilateral;    ROTATOR CUFF REPAIR Bilateral     SINUS SURGERY      TENDON RELEASE         SOCIAL HISTORY:  Social History     Socioeconomic History    Marital status:    Tobacco Use    Smoking status: Never Smoker    Smokeless tobacco: Never Used   Substance and Sexual Activity    Alcohol use: Yes    Drug use: Never    Sexual activity: Not Currently       FAMILY HISTORY:  Family History   Problem Relation Age of Onset    Heart disease Mother     Diabetes Mother     Thyroid disease Mother     Hypertension Mother     Diabetes Father     Hypertension Father     Cardiomyopathy Father     No Known Problems Brother     Arthritis Daughter     Asthma Daughter     Asthma Son     Heart disease Maternal Grandmother     Hypertension Paternal Grandmother          ALLERGIES:  Review of patient's allergies indicates:   Allergen Reactions    Lyrica [pregabalin] Other (See Comments)     Feels drunk        MEDICATIONS:    Current Outpatient Medications:     albuterol (PROVENTIL/VENTOLIN HFA) 90 mcg/actuation inhaler, Inhale 2 puffs into the lungs every 6 (six) hours as needed for Wheezing. Rescue, Disp: 18 g, Rfl: 3    amLODIPine-benazepriL (LOTREL) 5-40 mg per capsule, amlodipine 5 mg-benazepril 40 mg capsule  TAKE ONE CAPSULE BY MOUTH EVERY DAY, Disp: 90 capsule, Rfl: 3    BD ULTRA-FINE CARLINE PEN NEEDLE 32 gauge x 5/32" Ndle, Use to inject insulin 6 times daily., Disp: 400 each, Rfl: 3    blood sugar diagnostic (BLOOD GLUCOSE TEST) Strp, To check blood sugar 6 times daily, Disp: 600 each, " Rfl: 3    blood-glucose meter (TRUE METRIX GLUCOSE METER MISC), by Misc.(Non-Drug; Combo Route) route. Use as directed, Disp: , Rfl:     cholecalciferol, vitamin D3, 125 mcg (5,000 unit) Tab, Take 5,000 Units by mouth once daily., Disp: , Rfl:     dorzolamide-timolol 2-0.5% (COSOPT) 22.3-6.8 mg/mL ophthalmic solution, instill one drop in affected eye twice a day., Disp: , Rfl:     flash glucose scanning reader (FREESTYLE CARLOS 2 READER) [facility] [place], Use as directed., Disp: 1 each, Rfl: 0    flash glucose sensor (FREESTYLE CARLOS 2 SENSOR) Kit, Apply sensor every 14 days as directed., Disp: 6 kit, Rfl: 3    fluticasone propionate (FLONASE) 50 mcg/actuation nasal spray, 2 sprays by Each Nostril route once daily., Disp: , Rfl:     fluticasone-salmeterol diskus inhaler 100-50 mcg, Inhale 1 puff into the lungs 2 (two) times daily. Controller, Disp: 180 each, Rfl: 3    glucagon HCL (GLUCAGON, HCL, EMERGENCY KIT) 1 mg SolR, Inject as directed. Use as directed for hypoglycemia, Disp: , Rfl:     hydrOXYzine pamoate (VISTARIL) 25 MG Cap, Take 25 mg by mouth 3 (three) times daily as needed., Disp: , Rfl:     insulin (BASAGLAR KWIKPEN U-100 INSULIN) glargine 100 units/mL (3mL) SubQ pen, Inject 55 units sq every morning and 55 units sq every evening, Disp: 90 mL, Rfl: 3    insulin aspart U-100 (NOVOLOG FLEXPEN U-100 INSULIN) 100 unit/mL (3 mL) InPn pen, Inject sq following sliding scale not to exceed 50 units daily, Disp: 45 mL, Rfl: 3    metoclopramide HCl (REGLAN) 10 MG tablet, Take 10 mg by mouth every 6 (six) hours as needed., Disp: , Rfl:     metoprolol succinate (TOPROL-XL) 25 MG 24 hr tablet, Take 1 tablet (25 mg total) by mouth once daily., Disp: 90 tablet, Rfl: 3    ondansetron (ZOFRAN-ODT) 8 MG TbDL, ondansetron 8 mg disintegrating tablet  DISSOLVE ONE TABLET ON TONGUE TWICE DAILY AS NEEDED, Disp: , Rfl:     pantoprazole (PROTONIX) 40 MG tablet, Take 80 mg by mouth once daily., Disp: , Rfl:     pravastatin  "(PRAVACHOL) 40 MG tablet, Take 1 tablet (40 mg total) by mouth every evening., Disp: 90 tablet, Rfl: 3    promethazine (PHENERGAN) 25 MG tablet, Take 25 mg by mouth every 4 (four) hours. One tablet every 8 hours prn for nausea or headache, no more than 2 in a day or 3 days a week, no driving when taking this medication., Disp: , Rfl:     UBROGEPANT 100 mg tablet, TAKE ONE TABLET BY MOUTH AT ONSET of migraine, MAY REPEAT DOSE AFTER TWO hours. no more THAN TWO doses in APPLY 24 hour period, Disp: , Rfl:      PHYSICAL EXAM:  BP (!) 142/66 (BP Location: Left arm, Patient Position: Sitting)   Pulse 70   Resp 18   Ht 5' 6" (1.676 m)   Wt 96.2 kg (212 lb)   BMI 34.22 kg/m²   Wt Readings from Last 3 Encounters:   08/10/22 96.2 kg (212 lb)   08/02/22 95.6 kg (210 lb 12.8 oz)   07/30/22 96.2 kg (212 lb)      Body mass index is 34.22 kg/m².    Physical Exam  Vitals reviewed.   Constitutional:       Appearance: Normal appearance.   HENT:      Head: Normocephalic and atraumatic.   Neck:      Vascular: No carotid bruit or JVD.   Cardiovascular:      Rate and Rhythm: Normal rate and regular rhythm.      Pulses: Normal pulses.           Radial pulses are 2+ on the right side and 2+ on the left side.        Dorsalis pedis pulses are 2+ on the right side and 2+ on the left side.      Heart sounds: Normal heart sounds.   Pulmonary:      Effort: Pulmonary effort is normal.      Breath sounds: Normal breath sounds.   Abdominal:      Comments: + tenderness epigastric   Musculoskeletal:      Right lower leg: No edema.      Left lower leg: No edema.   Skin:     General: Skin is warm and dry.   Neurological:      Mental Status: She is alert.         LABS REVIEWED:  No results found for: WBC, RBC, HGB, HCT, MCV, MCH, MCHC, RDW, PLT, MPV, NRBC, INR  Lab Results   Component Value Date     (L) 04/28/2022    K 4.6 04/28/2022     04/28/2022    CO2 29 04/28/2022    BUN 14 04/28/2022     Lab Results   Component Value Date    AST " 14 (L) 04/28/2022    ALT 20 04/28/2022     Lab Results   Component Value Date     (H) 04/28/2022    HGBA1C 8.1 (A) 08/02/2022     Lab Results   Component Value Date    CHOL 172 04/28/2022    HDL 42 04/28/2022    TRIG 321 (H) 04/28/2022    CHOLHDL 4.1 04/28/2022       CARDIAC STUDIES REVIEWED:EKG: NSR, 77 bpm        ASSESSMENT:   Active Problem List with Overview Notes    Diagnosis Date Noted    Gastroparesis 08/10/2022    Epigastric pain 08/10/2022    Chest pain 08/10/2022    Gastroesophageal reflux disease without esophagitis 08/10/2022    Type 2 diabetes mellitus without complication, with long-term current use of insulin 08/02/2022    Hyperlipidemia     Wheezing 01/13/2022    Chronic serous otitis media of both ears 07/27/2021    Migraine headache without aura     HTN (hypertension)      Well controlled. Continue with lotrel and metoprolol      Diabetes mellitus type I     Bilateral tinnitus 04/13/2021    Neck pain 04/13/2021    Obesity 04/13/2021    Sleep pattern disturbance 04/13/2021    Stage 2 chronic kidney disease due to type 2 diabetes mellitus 04/13/2021    Temporomandibular joint disorder 04/13/2021    Chronic migraine without aura 04/12/2021    Proliferative diabetic retinopathy 05/09/2012     Formatting of this note might be different from the original.  ICD-10 Conversion update       VISIT DIAGNOSIS:  Chest pain, unspecified type  -     Echo; Future; Expected date: 09/10/2022  -     X-Ray Chest PA And Lateral; Future; Expected date: 08/10/2022  -     US Abdomen Limited; Future; Expected date: 08/10/2022  -     Exercise Stress - EKG; Future; Expected date: 09/10/2022    Primary hypertension  -     EKG 12-lead; Future  -     Echo; Future; Expected date: 09/10/2022  -     X-Ray Chest PA And Lateral; Future; Expected date: 08/10/2022  -     Exercise Stress - EKG; Future; Expected date: 09/10/2022    Type 2 diabetes mellitus without complication, with long-term current use of  insulin  -     Ambulatory referral/consult to Cardiology  -     Ambulatory referral/consult to Gastroenterology; Future; Expected date: 09/10/2022    Epigastric pain  -     US Abdomen Limited; Future; Expected date: 08/10/2022    Gastroesophageal reflux disease without esophagitis  -     Ambulatory referral/consult to Gastroenterology; Future; Expected date: 09/10/2022    Gastroparesis  -     Ambulatory referral/consult to Gastroenterology; Future; Expected date: 09/10/2022         PLAN:  1. Low CHO diet  2. Fish oil    Orders Placed This Encounter   Procedures    X-Ray Chest PA And Lateral     Standing Status:   Future     Standing Expiration Date:   8/10/2023     Order Specific Question:   May the Radiologist modify the order per protocol to meet the clinical needs of the patient?     Answer:   Yes     Order Specific Question:   Release to patient     Answer:   Immediate    US Abdomen Limited     Standing Status:   Future     Standing Expiration Date:   8/10/2023     Order Specific Question:   May the Radiologist modify the order per protocol to meet the clinical needs of the patient?     Answer:   Yes     Order Specific Question:   Release to patient     Answer:   Immediate    Ambulatory referral/consult to Gastroenterology     Standing Status:   Future     Standing Expiration Date:   2/10/2024     Referral Priority:   Routine     Referral Type:   Consultation     Referral Reason:   Specialty Services Required     Referred to Provider:   Kyle Patel MD     Requested Specialty:   Gastroenterology     Number of Visits Requested:   1    Exercise Stress - EKG     Standing Status:   Future     Standing Expiration Date:   9/10/2022     Order Specific Question:   Release to patient     Answer:   Immediate    EKG 12-lead     Standing Status:   Future     Number of Occurrences:   1     Standing Expiration Date:   8/10/2023    Echo     Standing Status:   Future     Standing Expiration Date:   9/10/2022      Order Specific Question:   Color Doppler?     Answer:   Yes     Order Specific Question:   Release to patient     Answer:   Immediate      RTC after tests.

## 2022-08-19 ENCOUNTER — PATIENT MESSAGE (OUTPATIENT)
Dept: DIABETES SERVICES | Facility: CLINIC | Age: 52
End: 2022-08-19
Payer: OTHER GOVERNMENT

## 2022-08-22 ENCOUNTER — HOSPITAL ENCOUNTER (OUTPATIENT)
Dept: RADIOLOGY | Facility: HOSPITAL | Age: 52
Discharge: HOME | End: 2022-08-22
Attending: INTERNAL MEDICINE
Payer: OTHER GOVERNMENT

## 2022-08-22 ENCOUNTER — HOSPITAL ENCOUNTER (OUTPATIENT)
Dept: CARDIOLOGY | Facility: HOSPITAL | Age: 52
Discharge: HOME | End: 2022-08-22
Attending: INTERNAL MEDICINE
Payer: OTHER GOVERNMENT

## 2022-08-22 VITALS
BODY MASS INDEX: 34.07 KG/M2 | DIASTOLIC BLOOD PRESSURE: 49 MMHG | WEIGHT: 212 LBS | WEIGHT: 212 LBS | HEART RATE: 87 BPM | BODY MASS INDEX: 34.07 KG/M2 | HEIGHT: 66 IN | HEIGHT: 66 IN | SYSTOLIC BLOOD PRESSURE: 165 MMHG

## 2022-08-22 DIAGNOSIS — R07.9 CHEST PAIN, UNSPECIFIED TYPE: ICD-10-CM

## 2022-08-22 DIAGNOSIS — R10.13 EPIGASTRIC PAIN: ICD-10-CM

## 2022-08-22 DIAGNOSIS — I10 PRIMARY HYPERTENSION: ICD-10-CM

## 2022-08-22 DIAGNOSIS — I10 PRIMARY HYPERTENSION: Chronic | ICD-10-CM

## 2022-08-22 PROCEDURE — 93306 ECHO (CUPID ONLY): ICD-10-PCS | Mod: 26,,, | Performed by: INTERNAL MEDICINE

## 2022-08-22 PROCEDURE — 93306 TTE W/DOPPLER COMPLETE: CPT

## 2022-08-22 PROCEDURE — 93306 TTE W/DOPPLER COMPLETE: CPT | Mod: 26,,, | Performed by: INTERNAL MEDICINE

## 2022-08-22 PROCEDURE — 93018 CV STRESS TEST I&R ONLY: CPT | Mod: ,,, | Performed by: INTERNAL MEDICINE

## 2022-08-22 PROCEDURE — 93016 CV STRESS TEST SUPVJ ONLY: CPT | Mod: ,,, | Performed by: NURSE PRACTITIONER

## 2022-08-22 PROCEDURE — 71046 XR CHEST PA AND LATERAL: ICD-10-PCS | Mod: 26,,, | Performed by: STUDENT IN AN ORGANIZED HEALTH CARE EDUCATION/TRAINING PROGRAM

## 2022-08-22 PROCEDURE — 71046 X-RAY EXAM CHEST 2 VIEWS: CPT | Mod: TC

## 2022-08-22 PROCEDURE — 76705 US ABDOMEN LIMITED: ICD-10-PCS | Mod: 26,,, | Performed by: RADIOLOGY

## 2022-08-22 PROCEDURE — 71046 X-RAY EXAM CHEST 2 VIEWS: CPT | Mod: 26,,, | Performed by: STUDENT IN AN ORGANIZED HEALTH CARE EDUCATION/TRAINING PROGRAM

## 2022-08-22 PROCEDURE — 93016 EXERCISE STRESS - EKG (CUPID ONLY): ICD-10-PCS | Mod: ,,, | Performed by: NURSE PRACTITIONER

## 2022-08-22 PROCEDURE — 76705 ECHO EXAM OF ABDOMEN: CPT | Mod: TC

## 2022-08-22 PROCEDURE — 76705 ECHO EXAM OF ABDOMEN: CPT | Mod: 26,,, | Performed by: RADIOLOGY

## 2022-08-22 PROCEDURE — 93017 CV STRESS TEST TRACING ONLY: CPT

## 2022-08-22 PROCEDURE — 93018 EXERCISE STRESS - EKG (CUPID ONLY): ICD-10-PCS | Mod: ,,, | Performed by: INTERNAL MEDICINE

## 2022-08-29 LAB
AORTIC VALVE CUSP SEPERATION: 16.3 CM
AV INDEX (PROSTH): 0.74
AV MEAN GRADIENT: 5 MMHG
AV PEAK GRADIENT: 9 MMHG
AV VALVE AREA: 2.31 CM2
BSA FOR ECHO PROCEDURE: 2.12 M2
CV ECHO LV RWT: 0.53 CM
DOP CALC AO PEAK VEL: 1.5 M/S
DOP CALC AO VTI: 30.75 CM
DOP CALC LVOT AREA: 3.1 CM2
DOP CALC LVOT DIAMETER: 2 CM
DOP CALC LVOT STROKE VOLUME: 71.18 CM3
DOP CALCLVOT PEAK VEL VTI: 22.67 CM
E WAVE DECELERATION TIME: 192 MSEC
E/A RATIO: 1
E/E' RATIO: 7.43 M/S
ECHO LV POSTERIOR WALL: 1.05 CM (ref 0.6–1.1)
EJECTION FRACTION: 70 %
FRACTIONAL SHORTENING: 47 % (ref 28–44)
INTERVENTRICULAR SEPTUM: 0.92 CM (ref 0.6–1.1)
IVC DIAMETER: 1.47 CM
LEFT ATRIUM SIZE: 2.96 CM
LEFT INTERNAL DIMENSION IN SYSTOLE: 2.08 CM (ref 2.1–4)
LEFT VENTRICLE DIASTOLIC VOLUME INDEX: 32.94 ML/M2
LEFT VENTRICLE DIASTOLIC VOLUME: 67.53 ML
LEFT VENTRICLE MASS INDEX: 59 G/M2
LEFT VENTRICLE SYSTOLIC VOLUME INDEX: 6.9 ML/M2
LEFT VENTRICLE SYSTOLIC VOLUME: 14.06 ML
LEFT VENTRICULAR INTERNAL DIMENSION IN DIASTOLE: 3.94 CM (ref 3.5–6)
LEFT VENTRICULAR MASS: 121.46 G
LV LATERAL E/E' RATIO: 6.5 M/S
LV SEPTAL E/E' RATIO: 8.67 M/S
MV PEAK A VEL: 0.78 M/S
MV PEAK E VEL: 0.78 M/S
TDI LATERAL: 0.12 M/S
TDI SEPTAL: 0.09 M/S
TDI: 0.11 M/S

## 2022-08-31 DIAGNOSIS — K21.9 GERD (GASTROESOPHAGEAL REFLUX DISEASE): Primary | ICD-10-CM

## 2022-09-01 LAB
CV STRESS BASE HR: 87 BPM
DIASTOLIC BLOOD PRESSURE: 49 MMHG
OHS CV CPX 85 PERCENT MAX PREDICTED HEART RATE MALE: 137
OHS CV CPX ESTIMATED METS: 5
OHS CV CPX MAX PREDICTED HEART RATE: 161
OHS CV CPX PATIENT IS FEMALE: 1
OHS CV CPX PATIENT IS MALE: 0
OHS CV CPX PEAK DIASTOLIC BLOOD PRESSURE: 68 MMHG
OHS CV CPX PEAK HEAR RATE: 122 BPM
OHS CV CPX PEAK RATE PRESSURE PRODUCT: NORMAL
OHS CV CPX PEAK SYSTOLIC BLOOD PRESSURE: 160 MMHG
OHS CV CPX PERCENT MAX PREDICTED HEART RATE ACHIEVED: 76
OHS CV CPX RATE PRESSURE PRODUCT PRESENTING: NORMAL
STRESS ECHO POST EXERCISE DUR MIN: 2 MINUTES
STRESS ECHO POST EXERCISE DUR SEC: 21 SECONDS
SYSTOLIC BLOOD PRESSURE: 165 MMHG

## 2022-09-07 ENCOUNTER — OFFICE VISIT (OUTPATIENT)
Dept: CARDIOLOGY | Facility: CLINIC | Age: 52
End: 2022-09-07
Payer: OTHER GOVERNMENT

## 2022-09-07 VITALS
WEIGHT: 207.38 LBS | HEIGHT: 66 IN | HEART RATE: 90 BPM | DIASTOLIC BLOOD PRESSURE: 62 MMHG | SYSTOLIC BLOOD PRESSURE: 102 MMHG | BODY MASS INDEX: 33.33 KG/M2 | OXYGEN SATURATION: 99 %

## 2022-09-07 DIAGNOSIS — R94.39 ABNORMAL STRESS TEST: ICD-10-CM

## 2022-09-07 DIAGNOSIS — Z79.899 OTHER LONG TERM (CURRENT) DRUG THERAPY: ICD-10-CM

## 2022-09-07 DIAGNOSIS — Z01.812 PRE-OPERATIVE LABORATORY EXAMINATION: ICD-10-CM

## 2022-09-07 DIAGNOSIS — R07.9 CHEST PAIN, UNSPECIFIED TYPE: Primary | ICD-10-CM

## 2022-09-07 PROCEDURE — 99214 OFFICE O/P EST MOD 30 MIN: CPT | Mod: S$PBB,,, | Performed by: NURSE PRACTITIONER

## 2022-09-07 PROCEDURE — 99214 OFFICE O/P EST MOD 30 MIN: CPT | Mod: PBBFAC | Performed by: NURSE PRACTITIONER

## 2022-09-07 PROCEDURE — 99214 PR OFFICE/OUTPT VISIT, EST, LEVL IV, 30-39 MIN: ICD-10-PCS | Mod: S$PBB,,, | Performed by: NURSE PRACTITIONER

## 2022-09-07 RX ORDER — DIPHENHYDRAMINE HCL 25 MG
50 CAPSULE ORAL
Status: CANCELLED | OUTPATIENT
Start: 2022-10-18

## 2022-09-07 RX ORDER — NITROGLYCERIN 0.4 MG/1
0.4 TABLET SUBLINGUAL EVERY 5 MIN PRN
Qty: 25 TABLET | Refills: 3 | Status: SHIPPED | OUTPATIENT
Start: 2022-09-07 | End: 2023-09-07

## 2022-09-07 RX ORDER — DIAZEPAM 2 MG/1
5 TABLET ORAL ONCE
Status: CANCELLED | OUTPATIENT
Start: 2022-10-18

## 2022-09-07 NOTE — PROGRESS NOTES
[facility] note        DATE OF SERVICE: 2022       PCP: Frank Cazares NP      CHIEF COMPLAINT:   Chief Complaint   Patient presents with    Results     Patient denies any cardiac complaints today.        HISTORY OF PRESENT ILLNESS:  Talia Lawrence is a 52 y.o. female with a PMH of   Past Medical History:   Diagnosis Date    CKD (chronic kidney disease), stage II     Diabetes mellitus type I     Disorder of kidney and ureter     GERD (gastroesophageal reflux disease)     Glaucoma     Goiter     HTN (hypertension)     Hyperlipidemia     Migraine headache without aura     Vitamin D deficiency      who presents for follow up to discuss the results of the EST/echo.   Chief Complaint   Patient presents with    Results     Patient denies any cardiac complaints today.            PAST MEDICAL HISTORY:  Past Medical History:   Diagnosis Date    CKD (chronic kidney disease), stage II     Diabetes mellitus type I     Disorder of kidney and ureter     GERD (gastroesophageal reflux disease)     Glaucoma     Goiter     HTN (hypertension)     Hyperlipidemia     Migraine headache without aura     Vitamin D deficiency        PAST SURGICAL HISTORY:  Past Surgical History:   Procedure Laterality Date    ANKLE FRACTURE SURGERY Right     ANKLE SURGERY Right     Plate and screws, fracture repair.    ANKLE SURGERY       SECTION      times 2    COLONOSCOPY  2017    Performed by Dr. Prescott.    HYSTERECTOMY      INJECTION OF FACET JOINT Bilateral 10/25/2018    Lumbar L3-S1 performed by Dr. Summers with Pain Treatment.    MYRINGOTOMY WITH INSERTION OF VENTILATION TUBE Bilateral 2021    Procedure: MYRINGOTOMY, WITH TYMPANOSTOMY TUBE INSERTION (T-TUBES);  Surgeon: Ki Gastelum MD;  Location: [facility];  Service: ENT;  Laterality: Bilateral;    ROTATOR CUFF REPAIR Bilateral     SINUS SURGERY      TENDON RELEASE         SOCIAL HISTORY:  Social History     Socioeconomic History    Marital status:  "   Tobacco Use    Smoking status: Never    Smokeless tobacco: Never   Substance and Sexual Activity    Alcohol use: Yes    Drug use: Never    Sexual activity: Not Currently       FAMILY HISTORY:  Family History   Problem Relation Age of Onset    Heart disease Mother     Diabetes Mother     Thyroid disease Mother     Hypertension Mother     Diabetes Father     Hypertension Father     Cardiomyopathy Father     No Known Problems Brother     Arthritis Daughter     Asthma Daughter     Asthma Son     Heart disease Maternal Grandmother     Hypertension Paternal Grandmother          ALLERGIES:  Review of patient's allergies indicates:   Allergen Reactions    Lyrica [pregabalin] Other (See Comments)     Feels drunk        MEDICATIONS:    Current Outpatient Medications:     albuterol (PROVENTIL/VENTOLIN HFA) 90 mcg/actuation inhaler, Inhale 2 puffs into the lungs every 6 (six) hours as needed for Wheezing. Rescue, Disp: 18 g, Rfl: 3    amLODIPine-benazepriL (LOTREL) 5-40 mg per capsule, amlodipine 5 mg-benazepril 40 mg capsule  TAKE ONE CAPSULE BY MOUTH EVERY DAY, Disp: 90 capsule, Rfl: 3    BD ULTRA-FINE CARLINE PEN NEEDLE 32 gauge x 5/32" Ndle, Use to inject insulin 6 times daily., Disp: 400 each, Rfl: 3    blood sugar diagnostic (BLOOD GLUCOSE TEST) Strp, To check blood sugar 6 times daily, Disp: 600 each, Rfl: 3    blood-glucose meter (TRUE METRIX GLUCOSE METER MISC), by Misc.(Non-Drug; Combo Route) route. Use as directed, Disp: , Rfl:     cholecalciferol, vitamin D3, 125 mcg (5,000 unit) Tab, Take 5,000 Units by mouth once daily., Disp: , Rfl:     dorzolamide-timolol 2-0.5% (COSOPT) 22.3-6.8 mg/mL ophthalmic solution, instill one drop in affected eye twice a day., Disp: , Rfl:     flash glucose scanning reader (FREESTYLE CARLOS 2 READER) [facility], Use as directed., Disp: 1 each, Rfl: 0    flash glucose sensor (FREESTYLE CARLOS 2 SENSOR) Kit, Apply sensor every 14 days as directed., Disp: 6 kit, Rfl: 3    fluticasone " propionate (FLONASE) 50 mcg/actuation nasal spray, 2 sprays by Each Nostril route once daily., Disp: , Rfl:     fluticasone-salmeterol diskus inhaler 100-50 mcg, Inhale 1 puff into the lungs 2 (two) times daily. Controller, Disp: 180 each, Rfl: 3    glucagon HCL (GLUCAGON, HCL, EMERGENCY KIT) 1 mg SolR, Inject as directed. Use as directed for hypoglycemia, Disp: , Rfl:     hydrOXYzine pamoate (VISTARIL) 25 MG Cap, Take 25 mg by mouth 3 (three) times daily as needed., Disp: , Rfl:     insulin (BASAGLAR KWIKPEN U-100 INSULIN) glargine 100 units/mL (3mL) SubQ pen, Inject 55 units sq every morning and 55 units sq every evening, Disp: 90 mL, Rfl: 3    insulin aspart U-100 (NOVOLOG FLEXPEN U-100 INSULIN) 100 unit/mL (3 mL) InPn pen, Inject sq following sliding scale not to exceed 50 units daily, Disp: 45 mL, Rfl: 3    metoclopramide HCl (REGLAN) 10 MG tablet, Take 10 mg by mouth every 6 (six) hours as needed., Disp: , Rfl:     metoprolol succinate (TOPROL-XL) 25 MG 24 hr tablet, Take 1 tablet (25 mg total) by mouth once daily., Disp: 90 tablet, Rfl: 3    nitroGLYCERIN (NITROSTAT) 0.4 MG SL tablet, Place 1 tablet (0.4 mg total) under the tongue every 5 (five) minutes as needed for Chest pain., Disp: 25 tablet, Rfl: 3    ondansetron (ZOFRAN-ODT) 8 MG TbDL, ondansetron 8 mg disintegrating tablet  DISSOLVE ONE TABLET ON TONGUE TWICE DAILY AS NEEDED, Disp: , Rfl:     pantoprazole (PROTONIX) 40 MG tablet, Take 80 mg by mouth once daily., Disp: , Rfl:     pravastatin (PRAVACHOL) 40 MG tablet, Take 1 tablet (40 mg total) by mouth every evening., Disp: 90 tablet, Rfl: 3    promethazine (PHENERGAN) 25 MG tablet, Take 25 mg by mouth every 4 (four) hours. One tablet every 8 hours prn for nausea or headache, no more than 2 in a day or 3 days a week, no driving when taking this medication., Disp: , Rfl:     UBROGEPANT 100 mg tablet, TAKE ONE TABLET BY MOUTH AT ONSET of migraine, MAY REPEAT DOSE AFTER TWO hours. no more THAN TWO doses  "in APPLY 24 hour period, Disp: , Rfl:   Medications have been reviewed but not reconciled. She did not bring her meds today.    PHYSICAL EXAM:  /62 (BP Location: Left arm, Patient Position: Sitting)   Pulse 90   Ht 5' 6" (1.676 m)   Wt 94.1 kg (207 lb 6.4 oz)   SpO2 99%   BMI 33.48 kg/m²   Wt Readings from Last 3 Encounters:   09/07/22 94.1 kg (207 lb 6.4 oz)   08/22/22 96.2 kg (212 lb)   08/22/22 96.2 kg (212 lb)      Body mass index is 33.48 kg/m².    Physical Exam  Vitals and nursing note reviewed.   Constitutional:       Appearance: Normal appearance. She is obese.   HENT:      Head: Normocephalic and atraumatic.   Eyes:      Pupils: Pupils are equal, round, and reactive to light.   Neck:      Vascular: No carotid bruit.   Cardiovascular:      Rate and Rhythm: Normal rate and regular rhythm.      Pulses: Normal pulses.      Heart sounds: Normal heart sounds.   Pulmonary:      Effort: Pulmonary effort is normal.      Breath sounds: Normal breath sounds.   Abdominal:      General: Bowel sounds are normal.      Palpations: Abdomen is soft.   Musculoskeletal:      Cervical back: Neck supple.      Right lower leg: No edema.      Left lower leg: No edema.   Skin:     General: Skin is warm and dry.      Capillary Refill: Capillary refill takes less than 2 seconds.   Neurological:      General: No focal deficit present.      Mental Status: She is alert and oriented to person, place, and time.   Psychiatric:         Mood and Affect: Mood normal.         Behavior: Behavior normal.       LABS REVIEWED:  No results found for: WBC, RBC, HGB, HCT, MCV, MCH, MCHC, RDW, PLT, MPV, NRBC, INR  Lab Results   Component Value Date     (L) 04/28/2022    K 4.6 04/28/2022     04/28/2022    CO2 29 04/28/2022    BUN 14 04/28/2022     Lab Results   Component Value Date    AST 14 (L) 04/28/2022    ALT 20 04/28/2022     Lab Results   Component Value Date     (H) 04/28/2022    HGBA1C 8.1 (A) 08/02/2022     Lab " Results   Component Value Date    CHOL 172 2022    HDL 42 2022    TRIG 321 (H) 2022    CHOLHDL 4.1 2022       CARDIAC STUDIES REVIEWED:EK/10/2022 normal EKG, normal sinus rhythm.   Stress test  Results for orders placed during the hospital encounter of 22    Exercise Stress - EKG    Interpretation Summary    The patient reported no chest pain during the stress test.    The blood pressure response to stress was normal.    There were no arrhythmias during stress.    Abnormal EST with 1mm ST depression in stage 1 at 121 bpm.     echocardiogram  Results for orders placed during the hospital encounter of 22    Echo    Interpretation Summary  · Concentric remodeling and normal systolic function.  · The estimated ejection fraction is 70%.  · Normal left ventricular diastolic function.  · Mild pulmonic regurgitation.  · Normal right ventricular size with normal right ventricular systolic function.           ASSESSMENT:   Patient Active Problem List   Diagnosis    Migraine headache without aura    HTN (hypertension)    Diabetes mellitus type I    Bilateral tinnitus    Chronic migraine without aura    Neck pain    Obesity    Proliferative diabetic retinopathy    Sleep pattern disturbance    Stage 2 chronic kidney disease due to type 2 diabetes mellitus    Temporomandibular joint disorder    Chronic serous otitis media of both ears    Wheezing    Hyperlipidemia    Type 2 diabetes mellitus without complication, with long-term current use of insulin    Gastroparesis    Epigastric pain    Chest pain    Gastroesophageal reflux disease without esophagitis            Problem List Items Addressed This Visit          Cardiac/Vascular    Chest pain - Primary    Relevant Medications    nitroGLYCERIN (NITROSTAT) 0.4 MG SL tablet    Other Relevant Orders    CBC Auto Differential    Case Request-Cath Lab: Angiogram, Coronary, with Left Heart Cath, Percutaneous coronary intervention (Completed)      Other Visit Diagnoses       Abnormal stress test        Relevant Medications    nitroGLYCERIN (NITROSTAT) 0.4 MG SL tablet    Other Relevant Orders    Case Request-Cath Lab: Angiogram, Coronary, with Left Heart Cath, Percutaneous coronary intervention (Completed)    Pre-operative laboratory examination        Relevant Orders    CBC Auto Differential    Comprehensive Metabolic Panel    Other long term (current) drug therapy        Relevant Orders    Comprehensive Metabolic Panel             PLAN: d/w the patient the results of her est/echo. We also discussed the r/b/a of [facility] with poss PCI. She wishes to proceed.  Orders Placed This Encounter   Procedures    CBC Auto Differential     Standing Status:   Future     Standing Expiration Date:   12/7/2022    Comprehensive Metabolic Panel     Standing Status:   Future     Standing Expiration Date:   12/7/2022    Case Request-Cath Lab: Angiogram, Coronary, with Left Heart Cath, Percutaneous coronary intervention     Standing Status:   Standing     Number of Occurrences:   1     Order Specific Question:   CPT Code:     Answer:   SD CATH PLACE/CORON ANGIO, IMG SUPER/INTERP,W LEFT HEART VENTRICULOGRAPHY [08094]     Order Specific Question:   CPT Code:     Answer:   SD  [95061]     Order Specific Question:   Medical Necessity:     Answer:   Medically Non-Urgent [100]     Order Specific Question:   Is an on-site pathologist required for this procedure?     Answer:   N/A     Order Specific Question:   Pre-op Diagnosis     Answer:   Chest pain [631752]     Order Specific Question:   Pre-op Diagnosis     Answer:   Cardiomyopathy [982434]     Order Specific Question:   Pre-op Diagnosis     Answer:   ICD (implantable cardioverter-defibrillator) battery depletion [2122072]     Order Specific Question:   Pre-op Diagnosis     Answer:   Abnormal results of cardiovascular function studies [[id number]]      RTC: after LHC with poss pci

## 2022-09-12 ENCOUNTER — PATIENT MESSAGE (OUTPATIENT)
Dept: DIABETES SERVICES | Facility: CLINIC | Age: 52
End: 2022-09-12
Payer: OTHER GOVERNMENT

## 2022-09-13 PROBLEM — E11.9 TYPE 2 DIABETES MELLITUS WITHOUT COMPLICATION, WITH LONG-TERM CURRENT USE OF INSULIN: Status: RESOLVED | Noted: 2022-08-02 | Resolved: 2022-09-13

## 2022-09-13 PROBLEM — Z79.4 TYPE 2 DIABETES MELLITUS WITHOUT COMPLICATION, WITH LONG-TERM CURRENT USE OF INSULIN: Status: RESOLVED | Noted: 2022-08-02 | Resolved: 2022-09-13

## 2022-09-18 ENCOUNTER — OFFICE VISIT (OUTPATIENT)
Dept: FAMILY MEDICINE | Facility: CLINIC | Age: 52
End: 2022-09-18
Payer: OTHER GOVERNMENT

## 2022-09-18 VITALS
WEIGHT: 208.63 LBS | SYSTOLIC BLOOD PRESSURE: 132 MMHG | RESPIRATION RATE: 20 BRPM | HEART RATE: 78 BPM | OXYGEN SATURATION: 99 % | DIASTOLIC BLOOD PRESSURE: 62 MMHG | BODY MASS INDEX: 33.53 KG/M2 | TEMPERATURE: 98 F | HEIGHT: 66 IN

## 2022-09-18 DIAGNOSIS — M25.571 ACUTE RIGHT ANKLE PAIN: Primary | ICD-10-CM

## 2022-09-18 DIAGNOSIS — M25.551 RIGHT HIP PAIN: ICD-10-CM

## 2022-09-18 PROBLEM — I10 HYPERTENSIVE DISORDER: Status: ACTIVE | Noted: 2021-04-13

## 2022-09-18 PROBLEM — E11.9 TYPE 2 DIABETES MELLITUS WITHOUT COMPLICATIONS: Status: ACTIVE | Noted: 2022-09-18

## 2022-09-18 PROCEDURE — 99051 PR MEDICAL SERVICES, EVE/WKEND/HOLIDAY: ICD-10-PCS | Mod: ,,, | Performed by: NURSE PRACTITIONER

## 2022-09-18 PROCEDURE — 99213 PR OFFICE/OUTPT VISIT, EST, LEVL III, 20-29 MIN: ICD-10-PCS | Mod: ,,, | Performed by: NURSE PRACTITIONER

## 2022-09-18 PROCEDURE — 99051 MED SERV EVE/WKEND/HOLIDAY: CPT | Mod: ,,, | Performed by: NURSE PRACTITIONER

## 2022-09-18 PROCEDURE — 99213 OFFICE O/P EST LOW 20 MIN: CPT | Mod: ,,, | Performed by: NURSE PRACTITIONER

## 2022-09-18 RX ORDER — BRIMONIDINE TARTRATE 2 MG/ML
1 SOLUTION/ DROPS OPHTHALMIC 2 TIMES DAILY
COMMUNITY
Start: 2022-09-16

## 2022-09-18 RX ORDER — TRAMADOL HYDROCHLORIDE 50 MG/1
50 TABLET ORAL EVERY 12 HOURS PRN
Qty: 12 TABLET | Refills: 0 | Status: SHIPPED | OUTPATIENT
Start: 2022-09-18 | End: 2023-01-25

## 2022-09-18 RX ORDER — TRAMADOL HYDROCHLORIDE 50 MG/1
TABLET ORAL
Qty: 14 TABLET | Refills: 0 | Status: SHIPPED | OUTPATIENT
Start: 2022-09-18 | End: 2022-09-18 | Stop reason: DRUGHIGH

## 2022-09-18 NOTE — PROGRESS NOTES
"Subjective:       Patient ID: Talia Lawrence is a 52 y.o. female.    Chief Complaint: Hip Pain ( Right  Ankle pain with right hip pain/) and Ankle Pain    Presents to clinic as above. Has broken her ankle in the past. Has hardware in ankle. No acute injury.   Review of Systems   Constitutional: Negative.    Respiratory: Negative.     Cardiovascular: Negative.    Musculoskeletal:  Positive for joint pain and myalgias.        Reviewed family, medical, surgical, and social history.    Objective:      /62 (BP Location: Right arm, Patient Position: Sitting, BP Method: Large (Manual))   Pulse 78   Temp 98.4 °F (36.9 °C) (Oral)   Resp 20   Ht 5' 6" (1.676 m)   Wt 94.6 kg (208 lb 9.6 oz)   SpO2 99%   BMI 33.67 kg/m²   Physical Exam  Vitals and nursing note reviewed.   Constitutional:       General: She is not in acute distress.     Appearance: Normal appearance. She is not ill-appearing, toxic-appearing or diaphoretic.   HENT:      Head: Normocephalic.      Mouth/Throat:      Mouth: Mucous membranes are moist.   Cardiovascular:      Rate and Rhythm: Normal rate and regular rhythm.      Heart sounds: Normal heart sounds.   Pulmonary:      Effort: Pulmonary effort is normal.      Breath sounds: Normal breath sounds.   Musculoskeletal:      Cervical back: Normal range of motion and neck supple.      Right hip: Bony tenderness and crepitus present. No deformity, lacerations or tenderness. Decreased range of motion. Normal strength.      Right ankle: No swelling, deformity, ecchymosis or lacerations. Tenderness present over the lateral malleolus and medial malleolus. Normal range of motion. Normal pulse.      Comments: No redness/swelling/warmth over ankle or hip. Pain in right hip with adduction and abduction.    Skin:     General: Skin is warm and dry.      Capillary Refill: Capillary refill takes less than 2 seconds.   Neurological:      Mental Status: She is alert and oriented to person, place, and time. "   Psychiatric:         Mood and Affect: Mood normal.         Behavior: Behavior normal.         Thought Content: Thought content normal.         Judgment: Judgment normal.          No visits with results within 1 Day(s) from this visit.   Latest known visit with results is:   Hospital Outpatient Visit on 08/22/2022   Component Date Value Ref Range Status    BSA 08/22/2022 2.12  m2 Final    TDI SEPTAL 08/22/2022 0.09  m/s Final    LV LATERAL E/E' RATIO 08/22/2022 6.50  m/s Final    LV SEPTAL E/E' RATIO 08/22/2022 8.67  m/s Final    IVC diameter 08/22/2022 1.47  cm Final    EF 08/22/2022 70  % Final    AORTIC VALVE CUSP SEPERATION 08/22/2022 [id number]  cm Final    TDI LATERAL 08/22/2022 0.12  m/s Final    LVIDd 08/22/2022 3.94  3.5 - 6.0 cm Final    IVS 08/22/2022 0.92  0.6 - 1.1 cm Final    Posterior Wall 08/22/2022 1.05  0.6 - 1.1 cm Final    LVIDs 08/22/2022 2.08 (A)  2.1 - 4.0 cm Final    FS 08/22/2022 47  28 - 44 % Final    LV mass 08/22/2022 121.46  g Final    LA size 08/22/2022 2.96  cm Final    Left Ventricle Relative Wall Thick* 08/22/2022 0.53  cm Final    AV mean gradient 08/22/2022 5  mmHg Final    AV valve area 08/22/2022 2.31  cm2 Final    AV index (prosthetic) 08/22/2022 0.74   Final    E/A ratio 08/22/2022 1.00   Final    Mean e' 08/22/2022 0.11  m/s Final    E wave deceleration time 08/22/2022 192.00  msec Final    LVOT diameter 08/22/2022 2.00  cm Final    LVOT area 08/22/2022 3.1  cm2 Final    LVOT peak VTI 08/22/2022 22.67  cm Final    Ao peak ronn 08/22/2022 1.5  m/s Final    Ao VTI 08/22/2022 30.75  cm Final    LVOT stroke volume 08/22/2022 71.18  cm3 Final    AV peak gradient 08/22/2022 9  mmHg Final    E/E' ratio 08/22/2022 7.43  m/s Final    MV Peak E Ronn 08/22/2022 0.78  m/s Final    MV Peak A Ronn 08/22/2022 0.78  m/s Final    LV Systolic Volume 08/22/2022 14.06  mL Final    LV Systolic Volume Index 08/22/2022 6.9  mL/m2 Final    LV Diastolic Volume 08/22/2022 67.53  mL Final    LV  Diastolic Volume Index 08/22/2022 32.94  mL/m2 Final    LV Mass Index 08/22/2022 59  g/m2 Final      Assessment:       1. Acute right ankle pain    2. Right hip pain        Plan:       Tramadol 50mg po bid prn pain   (The first Tramadol rx was shredded b/c dosing changed to bid due to renal disease. Teresa Lancaster RN witnessed.)  I will call with xray results  RTC PRN          Risks, benefits, and side effects were discussed with the patient. All questions were answered to the fullest satisfaction of the patient, and pt verbalized understanding and agreement to treatment plan. Pt was to call with any new or worsening symptoms, or present to the ER.

## 2022-09-20 ENCOUNTER — TELEPHONE (OUTPATIENT)
Dept: FAMILY MEDICINE | Facility: CLINIC | Age: 52
End: 2022-09-20
Payer: OTHER GOVERNMENT

## 2022-09-20 NOTE — TELEPHONE ENCOUNTER
Pt notified. Voiced understanding.----- Message from STEPHANIE Cruz sent at 9/19/2022  8:38 AM CDT -----  Notify that does have some arthritis. Hardware good.

## 2022-10-05 DIAGNOSIS — R94.39 ABNORMAL CARDIOVASCULAR STRESS TEST: ICD-10-CM

## 2022-10-05 DIAGNOSIS — R07.9 CHEST PAIN, UNSPECIFIED TYPE: Primary | ICD-10-CM

## 2022-10-05 RX ORDER — DIPHENHYDRAMINE HCL 25 MG
50 CAPSULE ORAL
Status: CANCELLED | OUTPATIENT
Start: 2022-10-18

## 2022-10-05 RX ORDER — DIAZEPAM 2 MG/1
5 TABLET ORAL
Status: CANCELLED | OUTPATIENT
Start: 2022-10-18

## 2022-10-05 RX ORDER — SODIUM CHLORIDE 450 MG/100ML
INJECTION, SOLUTION INTRAVENOUS CONTINUOUS
Status: CANCELLED | OUTPATIENT
Start: 2022-10-18

## 2022-10-11 ENCOUNTER — HOSPITAL ENCOUNTER (OUTPATIENT)
Dept: GASTROENTEROLOGY | Facility: HOSPITAL | Age: 52
Discharge: HOME | End: 2022-10-11
Attending: INTERNAL MEDICINE
Payer: OTHER GOVERNMENT

## 2022-10-11 ENCOUNTER — ANESTHESIA EVENT (OUTPATIENT)
Dept: GASTROENTEROLOGY | Facility: HOSPITAL | Age: 52
End: 2022-10-11
Payer: OTHER GOVERNMENT

## 2022-10-11 ENCOUNTER — ANESTHESIA (OUTPATIENT)
Dept: GASTROENTEROLOGY | Facility: HOSPITAL | Age: 52
End: 2022-10-11
Payer: OTHER GOVERNMENT

## 2022-10-11 VITALS
WEIGHT: 210 LBS | DIASTOLIC BLOOD PRESSURE: 69 MMHG | BODY MASS INDEX: 33.89 KG/M2 | RESPIRATION RATE: 16 BRPM | HEART RATE: 71 BPM | TEMPERATURE: 98 F | SYSTOLIC BLOOD PRESSURE: 144 MMHG | OXYGEN SATURATION: 99 %

## 2022-10-11 DIAGNOSIS — R13.19 ESOPHAGEAL DYSPHAGIA: ICD-10-CM

## 2022-10-11 DIAGNOSIS — E11.43 DIABETIC GASTROPARESIS: ICD-10-CM

## 2022-10-11 DIAGNOSIS — K29.00 ACUTE SUPERFICIAL GASTRITIS WITHOUT HEMORRHAGE: ICD-10-CM

## 2022-10-11 DIAGNOSIS — K21.9 GERD (GASTROESOPHAGEAL REFLUX DISEASE): ICD-10-CM

## 2022-10-11 DIAGNOSIS — K21.9 GASTROESOPHAGEAL REFLUX DISEASE WITHOUT ESOPHAGITIS: Primary | ICD-10-CM

## 2022-10-11 DIAGNOSIS — K31.84 DIABETIC GASTROPARESIS: ICD-10-CM

## 2022-10-11 LAB
GLUCOSE SERPL-MCNC: 108 MG/DL (ref 70–105)
GLUCOSE SERPL-MCNC: 44 MG/DL (ref 70–105)

## 2022-10-11 PROCEDURE — 25000003 PHARM REV CODE 250: Performed by: NURSE ANESTHETIST, CERTIFIED REGISTERED

## 2022-10-11 PROCEDURE — 27201423 OPTIME MED/SURG SUP & DEVICES STERILE SUPPLY

## 2022-10-11 PROCEDURE — 88342 SURGICAL PATHOLOGY: ICD-10-PCS | Mod: 26,,, | Performed by: PATHOLOGY

## 2022-10-11 PROCEDURE — D9220A PRA ANESTHESIA: ICD-10-PCS | Mod: ,,, | Performed by: NURSE ANESTHETIST, CERTIFIED REGISTERED

## 2022-10-11 PROCEDURE — D9220A PRA ANESTHESIA: Mod: ,,, | Performed by: NURSE ANESTHETIST, CERTIFIED REGISTERED

## 2022-10-11 PROCEDURE — 88305 SURGICAL PATHOLOGY: ICD-10-PCS | Mod: 26,XU,, | Performed by: PATHOLOGY

## 2022-10-11 PROCEDURE — 88305 TISSUE EXAM BY PATHOLOGIST: CPT | Mod: SUR | Performed by: INTERNAL MEDICINE

## 2022-10-11 PROCEDURE — 82962 GLUCOSE BLOOD TEST: CPT

## 2022-10-11 PROCEDURE — 43450 DILATE ESOPHAGUS 1/MULT PASS: CPT

## 2022-10-11 PROCEDURE — 88342 IMHCHEM/IMCYTCHM 1ST ANTB: CPT | Mod: 26,,, | Performed by: PATHOLOGY

## 2022-10-11 PROCEDURE — 88305 TISSUE EXAM BY PATHOLOGIST: CPT | Mod: 26,,, | Performed by: PATHOLOGY

## 2022-10-11 PROCEDURE — 37000008 HC ANESTHESIA 1ST 15 MINUTES

## 2022-10-11 PROCEDURE — 43239 EGD BIOPSY SINGLE/MULTIPLE: CPT

## 2022-10-11 PROCEDURE — 63600175 PHARM REV CODE 636 W HCPCS: Performed by: NURSE ANESTHETIST, CERTIFIED REGISTERED

## 2022-10-11 RX ORDER — PROPOFOL 10 MG/ML
INJECTION, EMULSION INTRAVENOUS
Status: DISCONTINUED | OUTPATIENT
Start: 2022-10-11 | End: 2022-10-11

## 2022-10-11 RX ORDER — LIDOCAINE HYDROCHLORIDE 20 MG/ML
INJECTION, SOLUTION EPIDURAL; INFILTRATION; INTRACAUDAL; PERINEURAL
Status: DISCONTINUED | OUTPATIENT
Start: 2022-10-11 | End: 2022-10-11

## 2022-10-11 RX ORDER — SODIUM CHLORIDE 0.9 % (FLUSH) 0.9 %
10 SYRINGE (ML) INJECTION
Status: CANCELLED | OUTPATIENT
Start: 2022-10-11

## 2022-10-11 RX ORDER — GLYCOPYRROLATE 0.2 MG/ML
INJECTION INTRAMUSCULAR; INTRAVENOUS
Status: DISCONTINUED | OUTPATIENT
Start: 2022-10-11 | End: 2022-10-11

## 2022-10-11 RX ADMIN — SODIUM CHLORIDE: 9 INJECTION, SOLUTION INTRAVENOUS at 08:10

## 2022-10-11 RX ADMIN — PROPOFOL 120 MG: 10 INJECTION, EMULSION INTRAVENOUS at 08:10

## 2022-10-11 RX ADMIN — LIDOCAINE HYDROCHLORIDE 80 MG: 20 INJECTION, SOLUTION INTRAVENOUS at 08:10

## 2022-10-11 RX ADMIN — GLYCOPYRROLATE 0.2 MG: 0.2 INJECTION INTRAMUSCULAR; INTRAVENOUS at 08:10

## 2022-10-11 RX ADMIN — PROPOFOL 80 MG: 10 INJECTION, EMULSION INTRAVENOUS at 08:10

## 2022-10-11 NOTE — TRANSFER OF CARE
Anesthesia Transfer of Care Note    Patient: Talia Lawrence    Procedure(s) Performed: * No procedures listed *    Patient location: PACU    Anesthesia Type: general    Transport from OR: Transported from OR on room air with adequate spontaneous ventilation    Post pain: adequate analgesia    Post assessment: no apparent anesthetic complications    Post vital signs: stable    Level of consciousness: alert and responds to stimulation    Nausea/Vomiting: no nausea/vomiting    Complications: none    Transfer of care protocol was followed      Last vitals:   Visit Vitals  BP (!) 115/53 (Patient Position: Lying)   Pulse 67   Temp 36.8 °C (98.3 °F) (Tympanic)   Resp 16   Wt 95.3 kg (210 lb)   SpO2 98%   Breastfeeding No   BMI 33.89 kg/m²

## 2022-10-11 NOTE — ANESTHESIA PREPROCEDURE EVALUATION
10/11/2022  Talia Lawrence is a 52 y.o., female.      Pre-op Assessment    I have reviewed the Patient Summary Reports.     I have reviewed the Nursing Notes. I have reviewed the NPO Status.   I have reviewed the Medications.     Review of Systems  Cardiovascular:   Hypertension    Renal/:   Chronic Renal Disease    Hepatic/GI:   GERD, poorly controlled    Neurological:   Headaches    Endocrine:   Diabetes      Diabetes mellitus type I GERD (gastroesophageal reflux disease)   HTN (hypertension) Migraine headache without aura   Vitamin D deficiency Disorder of kidney and ureter   Glaucoma Goiter   Hyperlipidemia CKD (chronic kidney disease), stage II         Physical Exam  General: Well nourished, Alert and Oriented    Airway:  Mallampati: III   Mouth Opening: Normal  TM Distance: Normal  Tongue: Normal  Neck ROM: Normal ROM    Dental:  Intact        Anesthesia Plan  Type of Anesthesia, risks & benefits discussed:    Anesthesia Type: Gen Natural Airway  Intra-op Monitoring Plan: Standard ASA Monitors  Post Op Pain Control Plan: multimodal analgesia  Induction:  IV  Informed Consent: Informed consent signed with the Patient and all parties understand the risks and agree with anesthesia plan.  All questions answered. Patient consented to blood products? Yes  ASA Score: 3  Day of Surgery Review of History & Physical: H&P Update referred to the surgeon/provider.I have interviewed and examined the patient. I have reviewed the patient's H&P dated: There are no significant changes.   Anesthesia Plan Notes: Treated for hypoglycemia in preop holding area, given 12.5G D50  Recheck : 108 mg/dL    Ready For Surgery From Anesthesia Perspective.     .

## 2022-10-11 NOTE — DISCHARGE INSTRUCTIONS
Procedure Date  10/11/22     Impression  Overall Impression: Mucosa of the esophagus was normal with z-line at 40cm. The distal esophagus was biopsied and the esophagus was dilated with a 48F [place]. The stomach had mild antral gastritis without ulcers, biopsies were obtained. A small fundic gland polyp was noted. Mucosa of the duodenum was normal.     Recommendation    Await pathology results      Avoid nsaids, eat small frequent low residue meals for gastroparesis; ppi or H2RA daily for gerd symptoms; repeat dilation prn.   THE NURSE WILL CALL YOU WITH YOUR BIOPSY RESULTS IN A FEW DAYS.   NO DRIVING, OPERATING EQUIPMENT, OR SIGNING LEGAL DOCUMENTS FOR 24 HOURS.

## 2022-10-11 NOTE — ANESTHESIA POSTPROCEDURE EVALUATION
Anesthesia Post Evaluation    Patient: Talia Lawrence    Procedure(s) Performed: * No procedures listed *    Final Anesthesia Type: general      Patient location during evaluation: PACU  Patient participation: Yes- Able to Participate  Level of consciousness: awake and alert  Post-procedure vital signs: reviewed and stable  Pain management: adequate  Airway patency: patent    PONV status at discharge: No PONV  Anesthetic complications: no      Cardiovascular status: blood pressure returned to baseline  Respiratory status: unassisted  Hydration status: euvolemic  Follow-up not needed.          Vitals Value Taken Time   BP 94/44 10/11/22 0844   Temp  10/11/22 0845   Pulse 65 10/11/22 0844   Resp 19 10/11/22 0844   SpO2 97 % 10/11/22 0844   Vitals shown include unvalidated device data.      No case tracking events are documented in the log.      Pain/Alirio Score: Alirio Score: 8 (10/11/2022  8:38 AM)

## 2022-10-11 NOTE — H&P
[facility]  Gastroenterology  H&P    Patient Name: Talia Lawrence  MRN: [id number]  Admission Date: 10/11/2022  Code Status: No Order    Attending Provider: Kyle Patel MD   Primary Care Physician: Frank Cazares NP  Principal Problem:<principal problem not specified>    Subjective:     History of Present Illness: Pt c/o gerd and she has gastroparesis.    Past Medical History:   Diagnosis Date    CKD (chronic kidney disease), stage II     Diabetes mellitus type I     Disorder of kidney and ureter     GERD (gastroesophageal reflux disease)     Glaucoma     Goiter     HTN (hypertension)     Hyperlipidemia     Migraine headache without aura     Vitamin D deficiency        Past Surgical History:   Procedure Laterality Date    ANKLE FRACTURE SURGERY Right     ANKLE SURGERY Right     Plate and screws, fracture repair.    ANKLE SURGERY       SECTION      times 2    COLONOSCOPY  2017    Performed by Dr. Prescott.    HYSTERECTOMY      INJECTION OF FACET JOINT Bilateral 10/25/2018    Lumbar L3-S1 performed by Dr. Summers with Pain Treatment.    MYRINGOTOMY WITH INSERTION OF VENTILATION TUBE Bilateral 2021    Procedure: MYRINGOTOMY, WITH TYMPANOSTOMY TUBE INSERTION (T-TUBES);  Surgeon: Ki Gastelum MD;  Location: [facility], [place];  Service: ENT;  Laterality: Bilateral;    ROTATOR CUFF REPAIR Bilateral     SINUS SURGERY      TENDON RELEASE         Review of patient's allergies indicates:   Allergen Reactions    Lyrica [pregabalin] Other (See Comments)     Feels drunk     Family History       Problem Relation (Age of Onset)    Arthritis Daughter    Asthma Daughter, Son    Cardiomyopathy Father    Diabetes Mother, Father    Heart disease Mother, Maternal Grandmother    Hypertension Mother, Father, Paternal Grandmother    No Known Problems Brother    Thyroid disease Mother          Tobacco Use    Smoking status: Never    Smokeless tobacco: Never   Substance and Sexual Activity    Alcohol use:  Yes    Drug use: Never    Sexual activity: Not Currently     Review of Systems   Respiratory: Negative.     Cardiovascular: Negative.    Gastrointestinal: Negative.    Objective:     Vital Signs (Most Recent):  Temp: 98.3 °F (36.8 °C) (10/11/22 0733)  Pulse: 72 (10/11/22 0733)  Resp: 20 (10/11/22 0733)  BP: (!) 195/87 (10/11/22 0733)  SpO2: 100 % (10/11/22 0733)   Vital Signs (24h Range):  Temp:  [98.3 °F (36.8 °C)] 98.3 °F (36.8 °C)  Pulse:  [72] 72  Resp:  [20] 20  SpO2:  [100 %] 100 %  BP: (195)/(87) 195/87     Weight: 95.3 kg (210 lb) (10/11/22 0733)  Body mass index is 33.89 kg/m².    No intake or output data in the 24 hours ending 10/11/22 0827    Lines/Drains/Airways       Peripheral Intravenous Line  Duration                  Peripheral IV - Single Lumen 10/11/22 0753 22 G Left;Posterior Hand <1 day                    Physical Exam  Vitals reviewed.   Constitutional:       General: She is not in acute distress.     Appearance: Normal appearance. She is well-developed. She is obese. She is not ill-appearing.   HENT:      Head: Normocephalic and atraumatic.      Nose: Nose normal.   Eyes:      Pupils: Pupils are equal, round, and reactive to light.   Cardiovascular:      Rate and Rhythm: Normal rate and regular rhythm.   Pulmonary:      Effort: Pulmonary effort is normal.      Breath sounds: Normal breath sounds. No wheezing.   Abdominal:      General: Abdomen is flat. Bowel sounds are normal. There is no distension.      Palpations: Abdomen is soft.      Tenderness: There is no abdominal tenderness. There is no guarding.   Skin:     General: Skin is warm and dry.      Coloration: Skin is not jaundiced.   Neurological:      Mental Status: She is alert.   Psychiatric:         Attention and Perception: Attention normal.         Mood and Affect: Affect normal.         Speech: Speech normal.         Behavior: Behavior is cooperative.      Comments: Pt was calm while speaking.       Significant Labs:  CBC: No  results for input(s): WBC, HGB, HCT, PLT in the last 48 hours.  CMP: No results for input(s): GLU, CALCIUM, ALBUMIN, PROT, NA, K, CO2, CL, BUN, CREATININE, ALKPHOS, ALT, AST, BILITOT in the last 48 hours.    Significant Imaging:  Imaging results within the past 24 hours have been reviewed.    Assessment/Plan:     There are no hospital problems to display for this patient.        Imp: gerd, esophageal dysphagia, diabetic gastroparesis  Plan: egd with dilation    Kyle Patel MD  Gastroenterology  [facility]

## 2022-10-12 LAB
DHEA SERPL-MCNC: NORMAL
ESTROGEN SERPL-MCNC: NORMAL PG/ML
INSULIN SERPL-ACNC: NORMAL U[IU]/ML
LAB AP GROSS DESCRIPTION: NORMAL
LAB AP LABORATORY NOTES: NORMAL
T3RU NFR SERPL: NORMAL %

## 2022-10-14 ENCOUNTER — OFFICE VISIT (OUTPATIENT)
Dept: FAMILY MEDICINE | Facility: CLINIC | Age: 52
End: 2022-10-14
Payer: OTHER GOVERNMENT

## 2022-10-14 VITALS
WEIGHT: 207 LBS | HEIGHT: 66 IN | HEART RATE: 62 BPM | BODY MASS INDEX: 33.27 KG/M2 | SYSTOLIC BLOOD PRESSURE: 116 MMHG | TEMPERATURE: 99 F | RESPIRATION RATE: 17 BRPM | DIASTOLIC BLOOD PRESSURE: 90 MMHG | OXYGEN SATURATION: 98 %

## 2022-10-14 DIAGNOSIS — J32.9 SINUSITIS, UNSPECIFIED CHRONICITY, UNSPECIFIED LOCATION: Primary | ICD-10-CM

## 2022-10-14 DIAGNOSIS — R68.83 CHILLS: ICD-10-CM

## 2022-10-14 DIAGNOSIS — Z11.52 ENCOUNTER FOR SCREENING LABORATORY TESTING FOR COVID-19 VIRUS: ICD-10-CM

## 2022-10-14 PROCEDURE — 96372 THER/PROPH/DIAG INJ SC/IM: CPT | Mod: ,,, | Performed by: FAMILY MEDICINE

## 2022-10-14 PROCEDURE — 99214 PR OFFICE/OUTPT VISIT, EST, LEVL IV, 30-39 MIN: ICD-10-PCS | Mod: 25,,, | Performed by: FAMILY MEDICINE

## 2022-10-14 PROCEDURE — 87428 POCT SARS-COV2 (COVID) WITH FLU ANTIGEN: ICD-10-PCS | Mod: QW,,, | Performed by: FAMILY MEDICINE

## 2022-10-14 PROCEDURE — 96372 PR INJECTION,THERAP/PROPH/DIAG2ST, IM OR SUBCUT: ICD-10-PCS | Mod: ,,, | Performed by: FAMILY MEDICINE

## 2022-10-14 PROCEDURE — 87428 SARSCOV & INF VIR A&B AG IA: CPT | Mod: QW,,, | Performed by: FAMILY MEDICINE

## 2022-10-14 PROCEDURE — 99214 OFFICE O/P EST MOD 30 MIN: CPT | Mod: 25,,, | Performed by: FAMILY MEDICINE

## 2022-10-14 RX ORDER — CEFTRIAXONE 1 G/1
1 INJECTION, POWDER, FOR SOLUTION INTRAMUSCULAR; INTRAVENOUS
Status: COMPLETED | OUTPATIENT
Start: 2022-10-14 | End: 2022-10-14

## 2022-10-14 RX ORDER — AMOXICILLIN AND CLAVULANATE POTASSIUM 875; 125 MG/1; MG/1
1 TABLET, FILM COATED ORAL 2 TIMES DAILY
Qty: 14 TABLET | Refills: 0 | Status: SHIPPED | OUTPATIENT
Start: 2022-10-14 | End: 2022-10-21

## 2022-10-14 RX ADMIN — CEFTRIAXONE 1 G: 1 INJECTION, POWDER, FOR SOLUTION INTRAMUSCULAR; INTRAVENOUS at 01:10

## 2022-10-14 NOTE — PROGRESS NOTES
Subjective:       Patient ID: Talia Lawrence is a 52 y.o. female.    Chief Complaint: Cough (Productive cough starting Tuesday. ), Sore Throat (Started Tuesday. ), and Chills    HPI  Review of Systems   Constitutional:  Negative for activity change, appetite change, chills, diaphoresis, fatigue, fever and unexpected weight change.   HENT:  Positive for nasal congestion, postnasal drip, rhinorrhea and sinus pressure/congestion. Negative for dental problem, drooling, ear discharge, ear pain, facial swelling, hearing loss, mouth sores, nosebleeds, sneezing, sore throat, tinnitus, trouble swallowing, voice change and goiter.    Eyes:  Negative for photophobia, discharge, itching and visual disturbance.   Respiratory:  Positive for cough. Negative for apnea, choking, chest tightness, shortness of breath, wheezing and stridor.    Cardiovascular:  Negative for chest pain, palpitations, leg swelling and claudication.   Gastrointestinal:  Negative for abdominal distention, abdominal pain, anal bleeding, blood in stool, change in bowel habit, constipation, diarrhea, nausea, vomiting and change in bowel habit.   Endocrine: Negative for cold intolerance, heat intolerance, polydipsia, polyphagia and polyuria.   Genitourinary:  Negative for bladder incontinence, decreased urine volume, difficulty urinating, dysuria, enuresis, flank pain, frequency, hematuria, nocturia, pelvic pain and urgency.   Musculoskeletal:  Negative for arthralgias, back pain, gait problem, joint swelling, leg pain, myalgias, neck pain, neck stiffness and joint deformity.   Integumentary:  Negative for pallor, rash, wound, breast mass and breast tenderness.   Allergic/Immunologic: Negative for environmental allergies, food allergies and immunocompromised state.   Neurological:  Negative for dizziness, vertigo, tremors, seizures, syncope, facial asymmetry, speech difficulty, weakness, light-headedness, numbness, headaches, coordination difficulties,  memory loss and coordination difficulties.   Hematological:  Negative for adenopathy. Does not bruise/bleed easily.   Psychiatric/Behavioral:  Negative for agitation, behavioral problems, confusion, decreased concentration, dysphoric mood, hallucinations, self-injury, sleep disturbance and suicidal ideas. The patient is not nervous/anxious and is not hyperactive.    Breast: Negative for mass and tenderness      Objective:      Physical Exam  Vitals reviewed.   Constitutional:       Appearance: Normal appearance.   HENT:      Head: Normocephalic and atraumatic.      Right Ear: Tympanic membrane, ear canal and external ear normal.      Left Ear: Tympanic membrane, ear canal and external ear normal.      Nose: Congestion and rhinorrhea present.      Mouth/Throat:      Mouth: Mucous membranes are moist.      Pharynx: Oropharynx is clear. Posterior oropharyngeal erythema present.   Eyes:      Extraocular Movements: Extraocular movements intact.      Conjunctiva/sclera: Conjunctivae normal.      Pupils: Pupils are equal, round, and reactive to light.   Cardiovascular:      Rate and Rhythm: Normal rate and regular rhythm.      Pulses: Normal pulses.      Heart sounds: Normal heart sounds.   Pulmonary:      Effort: Pulmonary effort is normal.      Breath sounds: Normal breath sounds.   Abdominal:      General: Bowel sounds are normal.      Palpations: Abdomen is soft.   Musculoskeletal:         General: Normal range of motion.      Cervical back: Normal range of motion and neck supple.   Skin:     General: Skin is warm and dry.   Neurological:      General: No focal deficit present.      Mental Status: She is alert. Mental status is at baseline.   Psychiatric:         Mood and Affect: Mood normal.         Behavior: Behavior normal.         Thought Content: Thought content normal.         Judgment: Judgment normal.       Assessment:       1. Sinusitis, unspecified chronicity, unspecified location    2. Chills    3. Encounter  for screening laboratory testing for COVID-19 virus          Plan:     Sinusitis, unspecified chronicity, unspecified location  -     cefTRIAXone injection 1 g    Chills  -     POCT SARS-COV2 (COVID) with Flu Antigen    Encounter for screening laboratory testing for COVID-19 virus  -     POCT SARS-COV2 (COVID) with Flu Antigen    Other orders  -     amoxicillin-clavulanate 875-125mg (AUGMENTIN) 875-125 mg per tablet; Take 1 tablet by mouth 2 (two) times a day. for 7 days  Dispense: 14 tablet; Refill: 0  -     brompheniramin-phenylephrin-DM (RYNEX DM) 1-2.5-5 mg/5 mL Soln; Take 5 mLs by mouth every 4 (four) hours as needed (cough).  Dispense: 118 mL; Refill: 0

## 2022-10-20 ENCOUNTER — TELEPHONE (OUTPATIENT)
Dept: GASTROENTEROLOGY | Facility: CLINIC | Age: 52
End: 2022-10-20
Payer: OTHER GOVERNMENT

## 2022-10-20 NOTE — TELEPHONE ENCOUNTER
Your EGD bx shows gastritis and reflux esophagitis. Follow recommendations and if you have any questions or concerns please give us a call at our office.    ----- Message from Kyle Patel MD sent at 10/17/2022  2:31 PM CDT -----  EGD bx shows gastritis and reflux esophagitis.  ----- Message -----  From: Interface, Lab In [facility]  Sent: 10/11/2022   7:56 AM CDT  To: Kyle Patel MD

## 2022-10-26 DIAGNOSIS — Z01.812 PRE-OPERATIVE LABORATORY EXAMINATION: Primary | ICD-10-CM

## 2022-10-26 DIAGNOSIS — R07.9 CHEST PAIN, UNSPECIFIED TYPE: Primary | ICD-10-CM

## 2022-10-26 DIAGNOSIS — Z01.818 OTHER SPECIFIED PRE-OPERATIVE EXAMINATION: ICD-10-CM

## 2022-10-26 DIAGNOSIS — R94.39 ABNORMAL CARDIOVASCULAR STRESS TEST: ICD-10-CM

## 2022-10-26 RX ORDER — SODIUM CHLORIDE 0.9 % (FLUSH) 0.9 %
10 SYRINGE (ML) INJECTION
Status: CANCELLED | OUTPATIENT
Start: 2022-11-15

## 2022-11-01 DIAGNOSIS — K21.9 GASTROESOPHAGEAL REFLUX DISEASE, UNSPECIFIED WHETHER ESOPHAGITIS PRESENT: Primary | ICD-10-CM

## 2022-11-01 RX ORDER — PANTOPRAZOLE SODIUM 40 MG/1
80 TABLET, DELAYED RELEASE ORAL DAILY
Qty: 60 TABLET | Refills: 2 | Status: SHIPPED | OUTPATIENT
Start: 2022-11-01 | End: 2023-06-12 | Stop reason: SDUPTHER

## 2022-11-01 NOTE — TELEPHONE ENCOUNTER
----- Message from Isabella Estrada sent at 11/1/2022  8:32 AM CDT -----  Needs protonix refill but needed to talk with nurse about needing it done by her mail order

## 2022-11-02 ENCOUNTER — OFFICE VISIT (OUTPATIENT)
Dept: DIABETES SERVICES | Facility: CLINIC | Age: 52
End: 2022-11-02
Payer: OTHER GOVERNMENT

## 2022-11-02 VITALS
RESPIRATION RATE: 14 BRPM | DIASTOLIC BLOOD PRESSURE: 76 MMHG | HEIGHT: 66 IN | SYSTOLIC BLOOD PRESSURE: 138 MMHG | WEIGHT: 205.19 LBS | HEART RATE: 80 BPM | OXYGEN SATURATION: 97 % | BODY MASS INDEX: 32.98 KG/M2

## 2022-11-02 DIAGNOSIS — E78.5 HYPERLIPIDEMIA, UNSPECIFIED HYPERLIPIDEMIA TYPE: ICD-10-CM

## 2022-11-02 DIAGNOSIS — K31.84 DIABETIC GASTROPARESIS: ICD-10-CM

## 2022-11-02 DIAGNOSIS — I10 PRIMARY HYPERTENSION: ICD-10-CM

## 2022-11-02 DIAGNOSIS — N18.2 STAGE 2 CHRONIC KIDNEY DISEASE DUE TO TYPE 2 DIABETES MELLITUS: ICD-10-CM

## 2022-11-02 DIAGNOSIS — E11.22 STAGE 2 CHRONIC KIDNEY DISEASE DUE TO TYPE 2 DIABETES MELLITUS: ICD-10-CM

## 2022-11-02 DIAGNOSIS — E11.3559 STABLE PROLIFERATIVE DIABETIC RETINOPATHY ASSOCIATED WITH TYPE 2 DIABETES MELLITUS, UNSPECIFIED LATERALITY: ICD-10-CM

## 2022-11-02 DIAGNOSIS — E11.43 DIABETIC GASTROPARESIS: ICD-10-CM

## 2022-11-02 DIAGNOSIS — E10.69 TYPE 1 DIABETES MELLITUS WITH OTHER SPECIFIED COMPLICATION: Primary | ICD-10-CM

## 2022-11-02 PROBLEM — E11.9 TYPE 2 DIABETES MELLITUS WITHOUT COMPLICATIONS: Status: RESOLVED | Noted: 2022-09-18 | Resolved: 2022-11-02

## 2022-11-02 LAB
GLUCOSE SERPL-MCNC: 130 MG/DL (ref 70–110)
HBA1C MFR BLD: 7.5 % (ref 4.5–6.6)

## 2022-11-02 PROCEDURE — 99214 OFFICE O/P EST MOD 30 MIN: CPT | Mod: S$PBB,,, | Performed by: NURSE PRACTITIONER

## 2022-11-02 PROCEDURE — 83036 HEMOGLOBIN GLYCOSYLATED A1C: CPT | Mod: PBBFAC | Performed by: NURSE PRACTITIONER

## 2022-11-02 PROCEDURE — 82962 GLUCOSE BLOOD TEST: CPT | Mod: PBBFAC | Performed by: NURSE PRACTITIONER

## 2022-11-02 PROCEDURE — 99214 PR OFFICE/OUTPT VISIT, EST, LEVL IV, 30-39 MIN: ICD-10-PCS | Mod: S$PBB,,, | Performed by: NURSE PRACTITIONER

## 2022-11-02 PROCEDURE — 99215 OFFICE O/P EST HI 40 MIN: CPT | Mod: PBBFAC | Performed by: NURSE PRACTITIONER

## 2022-11-02 RX ORDER — FLASH GLUCOSE SENSOR
KIT MISCELLANEOUS
Qty: 6 KIT | Refills: 3 | Status: SHIPPED | OUTPATIENT
Start: 2022-11-02 | End: 2023-06-13 | Stop reason: SDUPTHER

## 2022-11-02 RX ORDER — INSULIN GLARGINE 300 U/ML
INJECTION, SOLUTION SUBCUTANEOUS
Qty: 15 PEN | Refills: 1 | Status: ON HOLD | OUTPATIENT
Start: 2022-11-02 | End: 2022-11-16 | Stop reason: HOSPADM

## 2022-11-02 NOTE — PROGRESS NOTES
Subjective:       Patient ID: Talia Lawrence is a 52 y.o. female.    Chief Complaint: Diabetes Mellitus (Pt here for follow up visit and A1c, reports she was supposed to have a heart cath last month, but she had a URI, and now she has redness and soreness in both eyes. States her sugars have been up.  Monitors glucose with FS Aline. )    Here today for routine evaluation and med refill.  Her a1c is improved to to 7.5 from 8.1  Hemoglobin A1C       Date                     Value               Ref Range           Status                11/02/2022               7.5 (A)             4.5 - 6.6 %         Final                 08/02/2022               8.1 (A)             4.5 - 6.6 %         Final                 04/28/2022               8.1 (A)             4.5 - 6.6 %         Final                 04/22/2021               7.9                 %                   Final            ----------  Lab Results       Component                Value               Date                       MICROALBUR               1.3                 04/28/2022            Lab Results       Component                Value               Date                       CHOL                     172                 04/28/2022                 CHOL                     170                 04/22/2021                 CHOL                     155                 10/22/2020            Lab Results       Component                Value               Date                       HDL                      42                  04/28/2022                 HDL                      43                  04/22/2021                 HDL                      53                  10/22/2020            Lab Results       Component                Value               Date                       LDLCALC                  66                  04/28/2022                 LDLCALC                  96                  04/22/2021                 LDLCALC                  85                  10/22/2020             Lab Results       Component                Value               Date                       TRIG                     321 (H)             04/28/2022                 TRIG                     157 (H)             04/22/2021                 TRIG                     83                  10/22/2020            Lab Results       Component                Value               Date                       CHOLHDL                  4.1                 04/28/2022                 CHOLHDL                  4.0                 04/22/2021                 CHOLHDL                  2.9                 10/22/2020            CMP  Sodium       Date                     Value               Ref Range           Status                10/14/2022               136                 136 - 145 mmol*     Final            ----------  Potassium       Date                     Value               Ref Range           Status                10/14/2022               4.4                 3.5 - 5.1 mmol*     Final            ----------  Chloride       Date                     Value               Ref Range           Status                10/14/2022               105                 98 - 107 mmol/L     Final            ----------  CO2       Date                     Value               Ref Range           Status                10/14/2022               27                  21 - 32 mmol/L      Final            ----------  Glucose       Date                     Value               Ref Range           Status                10/14/2022               178 (H)             74 - 106 mg/dL      Final            ----------  BUN       Date                     Value               Ref Range           Status                10/14/2022               18                  7 - 18 mg/dL        Final            ----------  Creatinine       Date                     Value               Ref Range           Status                10/14/2022               1.29 (H)            0.55 - 1.02 mg*      Final            ----------  Calcium       Date                     Value               Ref Range           Status                10/14/2022               9.3                 8.5 - 10.1 mg/*     Final            ----------  Total Protein       Date                     Value               Ref Range           Status                10/14/2022               7.5                 6.4 - 8.2 g/dL      Final            ----------  Albumin       Date                     Value               Ref Range           Status                10/14/2022               3.6                 3.5 - 5.0 g/dL      Final            ----------  Bilirubin, Total       Date                     Value               Ref Range           Status                10/14/2022               0.3                 >0.0 - 1.2 mg/*     Final            ----------  Alk Phos       Date                     Value               Ref Range           Status                10/14/2022               184 (H)             41 - 108 U/L        Final            ----------  AST       Date                     Value               Ref Range           Status                10/14/2022               6 (L)               15 - 37 U/L         Final            ----------  ALT       Date                     Value               Ref Range           Status                10/14/2022               13                  13 - 56 U/L         Final            ----------  Anion Gap       Date                     Value               Ref Range           Status                10/14/2022               8                   7 - 16 mmol/L       Final            ----------  eGFR        Date                     Value               Ref Range           Status                04/22/2021               45 (L)              >=60 mL/min/1.*     Final            ----------  eGFR       Date                     Value               Ref Range           Status                04/28/2022               52 (L)               >=60 mL/min/1.*     Final            ----------      Review of Systems   Constitutional:  Negative for activity change, appetite change, diaphoresis and fatigue.   HENT:  Negative for nasal congestion, facial swelling and sinus pressure/congestion.    Eyes:  Negative for visual disturbance.   Respiratory:  Positive for shortness of breath. Negative for wheezing.    Cardiovascular:  Positive for chest pain. Negative for leg swelling.   Gastrointestinal:  Negative for constipation, diarrhea, nausea and vomiting.   Endocrine: Negative for polydipsia, polyphagia and polyuria.   Genitourinary:  Negative for dysuria, frequency and urgency.   Musculoskeletal:  Negative for gait problem and myalgias.   Integumentary:  Negative for color change, rash and wound.   Neurological:  Negative for dizziness, syncope, weakness, headaches, coordination difficulties and coordination difficulties.   Hematological:  Does not bruise/bleed easily.   Psychiatric/Behavioral:  Negative for self-injury, sleep disturbance and suicidal ideas. The patient is not nervous/anxious.        Objective:      Physical Exam  Vitals and nursing note reviewed.   Constitutional:       Appearance: Normal appearance.   HENT:      Head: Normocephalic.   Cardiovascular:      Rate and Rhythm: Normal rate and regular rhythm.      Heart sounds: Normal heart sounds.   Pulmonary:      Effort: Pulmonary effort is normal.      Breath sounds: Normal breath sounds.   Musculoskeletal:         General: Normal range of motion.   Skin:     General: Skin is warm and dry.   Neurological:      General: No focal deficit present.      Mental Status: She is alert and oriented to person, place, and time.   Psychiatric:         Mood and Affect: Mood normal.         Behavior: Behavior normal.         Thought Content: Thought content normal.         Judgment: Judgment normal.       Assessment:       Problem List Items Addressed This Visit          Neuro    Diabetic gastroparesis     Relevant Medications    insulin glargine U-300 conc (TOUJEO MAX U-300 SOLOSTAR) 300 unit/mL (3 mL) insulin pen       Ophtho    Proliferative diabetic retinopathy    Relevant Medications    insulin glargine U-300 conc (TOUJEO MAX U-300 SOLOSTAR) 300 unit/mL (3 mL) insulin pen       Cardiac/Vascular    Hyperlipidemia    Hypertensive disorder       Renal/    Stage 2 chronic kidney disease due to type 2 diabetes mellitus    Relevant Medications    insulin glargine U-300 conc (TOUJEO MAX U-300 SOLOSTAR) 300 unit/mL (3 mL) insulin pen       Endocrine    Diabetes mellitus type I - Primary    Relevant Medications    insulin glargine U-300 conc (TOUJEO MAX U-300 SOLOSTAR) 300 unit/mL (3 mL) insulin pen         Plan:       Problem List Items Addressed This Visit          Neuro    Diabetic gastroparesis    Relevant Medications    insulin glargine U-300 conc (TOUJEO MAX U-300 SOLOSTAR) 300 unit/mL (3 mL) insulin pen       Ophtho    Proliferative diabetic retinopathy    Relevant Medications    insulin glargine U-300 conc (TOUJEO MAX U-300 SOLOSTAR) 300 unit/mL (3 mL) insulin pen       Cardiac/Vascular    Hyperlipidemia    Hypertensive disorder       Renal/    Stage 2 chronic kidney disease due to type 2 diabetes mellitus    Relevant Medications    insulin glargine U-300 conc (TOUJEO MAX U-300 SOLOSTAR) 300 unit/mL (3 mL) insulin pen       Endocrine    Diabetes mellitus type I - Primary    Relevant Medications    insulin glargine U-300 conc (TOUJEO MAX U-300 SOLOSTAR) 300 unit/mL (3 mL) insulin pen     Change basaglar to toujeo per her request and change dose to 54 units bid    Take sliding scale as directed.

## 2022-11-13 ENCOUNTER — HOSPITAL ENCOUNTER (OUTPATIENT)
Facility: HOSPITAL | Age: 52
Discharge: HOME | End: 2022-11-16
Attending: HOSPITALIST | Admitting: HOSPITALIST
Payer: OTHER GOVERNMENT

## 2022-11-13 DIAGNOSIS — K31.84 DIABETIC GASTROPARESIS: ICD-10-CM

## 2022-11-13 DIAGNOSIS — R94.39 ABNORMAL STRESS TEST: ICD-10-CM

## 2022-11-13 DIAGNOSIS — H93.13 BILATERAL TINNITUS: ICD-10-CM

## 2022-11-13 DIAGNOSIS — R07.9 CHEST PAIN: Primary | ICD-10-CM

## 2022-11-13 DIAGNOSIS — E11.22 STAGE 2 CHRONIC KIDNEY DISEASE DUE TO TYPE 2 DIABETES MELLITUS: ICD-10-CM

## 2022-11-13 DIAGNOSIS — E11.3559 STABLE PROLIFERATIVE DIABETIC RETINOPATHY ASSOCIATED WITH TYPE 2 DIABETES MELLITUS, UNSPECIFIED LATERALITY: ICD-10-CM

## 2022-11-13 DIAGNOSIS — M54.2 NECK PAIN: ICD-10-CM

## 2022-11-13 DIAGNOSIS — K21.9 GASTROESOPHAGEAL REFLUX DISEASE WITHOUT ESOPHAGITIS: Chronic | ICD-10-CM

## 2022-11-13 DIAGNOSIS — R06.2 WHEEZING: Chronic | ICD-10-CM

## 2022-11-13 DIAGNOSIS — I10 PRIMARY HYPERTENSION: ICD-10-CM

## 2022-11-13 DIAGNOSIS — E66.9 CLASS 1 OBESITY WITH BODY MASS INDEX (BMI) OF 33.0 TO 33.9 IN ADULT, UNSPECIFIED OBESITY TYPE, UNSPECIFIED WHETHER SERIOUS COMORBIDITY PRESENT: ICD-10-CM

## 2022-11-13 DIAGNOSIS — E16.2 HYPOGLYCEMIA: ICD-10-CM

## 2022-11-13 DIAGNOSIS — R10.13 EPIGASTRIC PAIN: ICD-10-CM

## 2022-11-13 DIAGNOSIS — G47.20 SLEEP PATTERN DISTURBANCE: ICD-10-CM

## 2022-11-13 DIAGNOSIS — G43.709 CHRONIC MIGRAINE WITHOUT AURA WITHOUT STATUS MIGRAINOSUS, NOT INTRACTABLE: ICD-10-CM

## 2022-11-13 DIAGNOSIS — T68.XXXA HYPOTHERMIA, INITIAL ENCOUNTER: ICD-10-CM

## 2022-11-13 DIAGNOSIS — R13.19 ESOPHAGEAL DYSPHAGIA: ICD-10-CM

## 2022-11-13 DIAGNOSIS — N18.2 STAGE 2 CHRONIC KIDNEY DISEASE DUE TO TYPE 2 DIABETES MELLITUS: ICD-10-CM

## 2022-11-13 DIAGNOSIS — H65.23 CHRONIC SEROUS OTITIS MEDIA OF BOTH EARS: ICD-10-CM

## 2022-11-13 DIAGNOSIS — E10.69 TYPE 1 DIABETES MELLITUS WITH OTHER SPECIFIED COMPLICATION: ICD-10-CM

## 2022-11-13 DIAGNOSIS — M26.609 TEMPOROMANDIBULAR JOINT DISORDER: ICD-10-CM

## 2022-11-13 DIAGNOSIS — I10 HYPERTENSION: ICD-10-CM

## 2022-11-13 DIAGNOSIS — E78.5 HYPERLIPIDEMIA, UNSPECIFIED HYPERLIPIDEMIA TYPE: ICD-10-CM

## 2022-11-13 DIAGNOSIS — G43.009 MIGRAINE WITHOUT AURA AND WITHOUT STATUS MIGRAINOSUS, NOT INTRACTABLE: ICD-10-CM

## 2022-11-13 DIAGNOSIS — E11.43 DIABETIC GASTROPARESIS: ICD-10-CM

## 2022-11-13 DIAGNOSIS — I25.119 CHEST PAIN DUE TO CAD: ICD-10-CM

## 2022-11-13 DIAGNOSIS — K29.00 ACUTE SUPERFICIAL GASTRITIS WITHOUT HEMORRHAGE: ICD-10-CM

## 2022-11-13 LAB
ALBUMIN SERPL BCP-MCNC: 3.5 G/DL (ref 3.5–5)
ALBUMIN/GLOB SERPL: 1 {RATIO}
ALP SERPL-CCNC: 155 U/L (ref 41–108)
ALT SERPL W P-5'-P-CCNC: 16 U/L (ref 13–56)
ANION GAP SERPL CALCULATED.3IONS-SCNC: 14 MMOL/L (ref 7–16)
AST SERPL W P-5'-P-CCNC: 12 U/L (ref 15–37)
BASOPHILS # BLD AUTO: 0.05 K/UL (ref 0–0.2)
BASOPHILS NFR BLD AUTO: 0.5 % (ref 0–1)
BILIRUB SERPL-MCNC: 0.5 MG/DL (ref ?–1.2)
BILIRUB UR QL STRIP: NEGATIVE
BUN SERPL-MCNC: 17 MG/DL (ref 7–18)
BUN/CREAT SERPL: 16 (ref 6–20)
CALCIUM SERPL-MCNC: 8.6 MG/DL (ref 8.5–10.1)
CHLORIDE SERPL-SCNC: 105 MMOL/L (ref 98–107)
CLARITY UR: CLEAR
CO2 SERPL-SCNC: 26 MMOL/L (ref 21–32)
COLOR UR: ABNORMAL
CREAT SERPL-MCNC: 1.06 MG/DL (ref 0.55–1.02)
DIFFERENTIAL METHOD BLD: ABNORMAL
EGFR (NO RACE VARIABLE) (RUSH/TITUS): 64 ML/MIN/1.73M²
EOSINOPHIL # BLD AUTO: 0.14 K/UL (ref 0–0.5)
EOSINOPHIL NFR BLD AUTO: 1.5 % (ref 1–4)
ERYTHROCYTE [DISTWIDTH] IN BLOOD BY AUTOMATED COUNT: 13.9 % (ref 11.5–14.5)
GLOBULIN SER-MCNC: 3.5 G/DL (ref 2–4)
GLUCOSE SERPL-MCNC: 240 MG/DL (ref 70–105)
GLUCOSE SERPL-MCNC: 286 MG/DL (ref 70–105)
GLUCOSE SERPL-MCNC: 61 MG/DL (ref 74–106)
GLUCOSE SERPL-MCNC: 67 MG/DL (ref 70–105)
GLUCOSE SERPL-MCNC: 98 MG/DL (ref 70–105)
GLUCOSE UR STRIP-MCNC: 300 MG/DL
HADV DNA NPH QL NAA+NON-PROBE: NOT DETECTED
HCT VFR BLD AUTO: 40.7 % (ref 38–47)
HGB BLD-MCNC: 12.9 G/DL (ref 12–16)
HMPV RNA NPH QL NAA+NON-PROBE: NOT DETECTED
IMM GRANULOCYTES # BLD AUTO: 0.06 K/UL (ref 0–0.04)
IMM GRANULOCYTES NFR BLD: 0.6 % (ref 0–0.4)
INFLUENZA A (SUBTYPE H1): NOT DETECTED
INFLUENZA A (SUBTYPE H3): NOT DETECTED
INFLUENZA A (VERIGENE): NOT DETECTED
INFLUENZA B (VERIGENE): NOT DETECTED
KETONES UR STRIP-SCNC: NEGATIVE MG/DL
LACTATE SERPL-SCNC: 0.9 MMOL/L (ref 0.4–2)
LEUKOCYTE ESTERASE UR QL STRIP: NEGATIVE
LYMPHOCYTES # BLD AUTO: 1.64 K/UL (ref 1–4.8)
LYMPHOCYTES NFR BLD AUTO: 17.1 % (ref 27–41)
MCH RBC QN AUTO: 23.9 PG (ref 27–31)
MCHC RBC AUTO-ENTMCNC: 31.7 G/DL (ref 32–36)
MCV RBC AUTO: 75.5 FL (ref 80–96)
MONOCYTES # BLD AUTO: 0.52 K/UL (ref 0–0.8)
MONOCYTES NFR BLD AUTO: 5.4 % (ref 2–6)
MPC BLD CALC-MCNC: 11.2 FL (ref 9.4–12.4)
NEUTROPHILS # BLD AUTO: 7.18 K/UL (ref 1.8–7.7)
NEUTROPHILS NFR BLD AUTO: 74.9 % (ref 53–65)
NITRITE UR QL STRIP: NEGATIVE
NRBC # BLD AUTO: 0 X10E3/UL
NRBC, AUTO (.00): 0 %
NT-PROBNP SERPL-MCNC: 73 PG/ML (ref 1–125)
PARAINFLUENZA 1: NOT DETECTED
PARAINFLUENZA 2: NOT DETECTED
PARAINFLUENZA 3: NOT DETECTED
PARAINFLUENZA 4: NOT DETECTED
PH UR STRIP: 7.5 PH UNITS
PLATELET # BLD AUTO: 244 K/UL (ref 150–400)
POTASSIUM SERPL-SCNC: 4.5 MMOL/L (ref 3.5–5.1)
PROT SERPL-MCNC: 7 G/DL (ref 6.4–8.2)
PROT UR QL STRIP: 70
RBC # BLD AUTO: 5.39 M/UL (ref 4.2–5.4)
RBC # UR STRIP: NEGATIVE /UL
RESPIRATORY SYNCYTIAL VIRUS A: NOT DETECTED
RESPIRATORY SYNCYTIAL VIRUS B: NOT DETECTED
RHINOVIRUS: NOT DETECTED
SODIUM SERPL-SCNC: 140 MMOL/L (ref 136–145)
SP GR UR STRIP: 1.01
TROPONIN I SERPL HS-MCNC: 12.9 PG/ML
UROBILINOGEN UR STRIP-ACNC: NORMAL MG/DL
WBC # BLD AUTO: 9.59 K/UL (ref 4.5–11)

## 2022-11-13 PROCEDURE — G0378 HOSPITAL OBSERVATION PER HR: HCPCS

## 2022-11-13 PROCEDURE — 99220 PR INITIAL OBSERVATION CARE,LEVL III: ICD-10-PCS | Mod: 25,,, | Performed by: HOSPITALIST

## 2022-11-13 PROCEDURE — 63600175 PHARM REV CODE 636 W HCPCS: Performed by: NURSE PRACTITIONER

## 2022-11-13 PROCEDURE — 82962 GLUCOSE BLOOD TEST: CPT

## 2022-11-13 PROCEDURE — 83880 ASSAY OF NATRIURETIC PEPTIDE: CPT | Performed by: HOSPITALIST

## 2022-11-13 PROCEDURE — 94761 N-INVAS EAR/PLS OXIMETRY MLT: CPT

## 2022-11-13 PROCEDURE — 81003 URINALYSIS AUTO W/O SCOPE: CPT | Performed by: NURSE PRACTITIONER

## 2022-11-13 PROCEDURE — 93010 ELECTROCARDIOGRAM REPORT: CPT | Mod: ,,, | Performed by: HOSPITALIST

## 2022-11-13 PROCEDURE — 99220 PR INITIAL OBSERVATION CARE,LEVL III: CPT | Mod: 25,,, | Performed by: HOSPITALIST

## 2022-11-13 PROCEDURE — 85025 COMPLETE CBC W/AUTO DIFF WBC: CPT | Performed by: NURSE PRACTITIONER

## 2022-11-13 PROCEDURE — 93010 EKG 12-LEAD: ICD-10-PCS | Mod: ,,, | Performed by: HOSPITALIST

## 2022-11-13 PROCEDURE — 80053 COMPREHEN METABOLIC PANEL: CPT | Performed by: NURSE PRACTITIONER

## 2022-11-13 PROCEDURE — 96361 HYDRATE IV INFUSION ADD-ON: CPT

## 2022-11-13 PROCEDURE — 63600175 PHARM REV CODE 636 W HCPCS: Performed by: HOSPITALIST

## 2022-11-13 PROCEDURE — 84484 ASSAY OF TROPONIN QUANT: CPT | Performed by: HOSPITALIST

## 2022-11-13 PROCEDURE — 99283 PR EMERGENCY DEPT VISIT,LEVEL III: ICD-10-PCS | Mod: ,,, | Performed by: NURSE PRACTITIONER

## 2022-11-13 PROCEDURE — 25000003 PHARM REV CODE 250: Performed by: HOSPITALIST

## 2022-11-13 PROCEDURE — 83605 ASSAY OF LACTIC ACID: CPT | Performed by: NURSE PRACTITIONER

## 2022-11-13 PROCEDURE — 87040 BLOOD CULTURE FOR BACTERIA: CPT | Performed by: NURSE PRACTITIONER

## 2022-11-13 PROCEDURE — 99285 EMERGENCY DEPT VISIT HI MDM: CPT | Mod: 25

## 2022-11-13 PROCEDURE — 99283 EMERGENCY DEPT VISIT LOW MDM: CPT | Mod: ,,, | Performed by: NURSE PRACTITIONER

## 2022-11-13 PROCEDURE — 87633 RESP VIRUS 12-25 TARGETS: CPT | Performed by: HOSPITALIST

## 2022-11-13 PROCEDURE — 96372 THER/PROPH/DIAG INJ SC/IM: CPT | Mod: 59

## 2022-11-13 PROCEDURE — 93005 ELECTROCARDIOGRAM TRACING: CPT

## 2022-11-13 PROCEDURE — 36415 COLL VENOUS BLD VENIPUNCTURE: CPT | Performed by: NURSE PRACTITIONER

## 2022-11-13 RX ORDER — ENOXAPARIN SODIUM 100 MG/ML
40 INJECTION SUBCUTANEOUS EVERY 24 HOURS
Status: DISCONTINUED | OUTPATIENT
Start: 2022-11-13 | End: 2022-11-16 | Stop reason: HOSPADM

## 2022-11-13 RX ORDER — SODIUM CHLORIDE 0.9 % (FLUSH) 0.9 %
10 SYRINGE (ML) INJECTION EVERY 12 HOURS PRN
Status: DISCONTINUED | OUTPATIENT
Start: 2022-11-13 | End: 2022-11-16 | Stop reason: HOSPADM

## 2022-11-13 RX ORDER — NALOXONE HCL 0.4 MG/ML
0.02 VIAL (ML) INJECTION
Status: DISCONTINUED | OUTPATIENT
Start: 2022-11-13 | End: 2022-11-16 | Stop reason: HOSPADM

## 2022-11-13 RX ORDER — HYDRALAZINE HYDROCHLORIDE 25 MG/1
25 TABLET, FILM COATED ORAL EVERY 8 HOURS PRN
Status: DISCONTINUED | OUTPATIENT
Start: 2022-11-13 | End: 2022-11-16 | Stop reason: HOSPADM

## 2022-11-13 RX ORDER — DEXTROSE MONOHYDRATE 100 MG/ML
25 INJECTION, SOLUTION INTRAVENOUS
Status: DISCONTINUED | OUTPATIENT
Start: 2022-11-13 | End: 2022-11-16 | Stop reason: HOSPADM

## 2022-11-13 RX ORDER — LISINOPRIL 10 MG/1
10 TABLET ORAL DAILY
Status: DISCONTINUED | OUTPATIENT
Start: 2022-11-14 | End: 2022-11-14

## 2022-11-13 RX ORDER — METOPROLOL TARTRATE 25 MG/1
25 TABLET, FILM COATED ORAL 2 TIMES DAILY
Status: DISCONTINUED | OUTPATIENT
Start: 2022-11-13 | End: 2022-11-16

## 2022-11-13 RX ORDER — AMLODIPINE BESYLATE 5 MG/1
5 TABLET ORAL DAILY
Status: DISCONTINUED | OUTPATIENT
Start: 2022-11-13 | End: 2022-11-16 | Stop reason: HOSPADM

## 2022-11-13 RX ORDER — ACETAMINOPHEN 325 MG/1
650 TABLET ORAL EVERY 8 HOURS PRN
Status: DISCONTINUED | OUTPATIENT
Start: 2022-11-13 | End: 2022-11-15

## 2022-11-13 RX ORDER — ACETAMINOPHEN 325 MG/1
650 TABLET ORAL EVERY 4 HOURS PRN
Status: DISCONTINUED | OUTPATIENT
Start: 2022-11-13 | End: 2022-11-16 | Stop reason: HOSPADM

## 2022-11-13 RX ORDER — ASPIRIN 81 MG/1
81 TABLET ORAL DAILY
Status: DISCONTINUED | OUTPATIENT
Start: 2022-11-14 | End: 2022-11-14

## 2022-11-13 RX ORDER — DEXTROSE MONOHYDRATE AND SODIUM CHLORIDE 5; .45 G/100ML; G/100ML
INJECTION, SOLUTION INTRAVENOUS CONTINUOUS
Status: DISCONTINUED | OUTPATIENT
Start: 2022-11-13 | End: 2022-11-13

## 2022-11-13 RX ORDER — DEXTROSE MONOHYDRATE 100 MG/ML
12.5 INJECTION, SOLUTION INTRAVENOUS
Status: DISCONTINUED | OUTPATIENT
Start: 2022-11-13 | End: 2022-11-16 | Stop reason: HOSPADM

## 2022-11-13 RX ORDER — GLUCAGON 1 MG
1 KIT INJECTION
Status: DISCONTINUED | OUTPATIENT
Start: 2022-11-13 | End: 2022-11-16 | Stop reason: HOSPADM

## 2022-11-13 RX ADMIN — ENOXAPARIN SODIUM 40 MG: 100 INJECTION SUBCUTANEOUS at 06:11

## 2022-11-13 RX ADMIN — METOPROLOL TARTRATE 25 MG: 25 TABLET, FILM COATED ORAL at 09:11

## 2022-11-13 RX ADMIN — SODIUM CHLORIDE, POTASSIUM CHLORIDE, SODIUM LACTATE AND CALCIUM CHLORIDE 2790 ML: 600; 310; 30; 20 INJECTION, SOLUTION INTRAVENOUS at 01:11

## 2022-11-13 NOTE — Clinical Note
The catheter was inserted over the wire into the left ventricle. Pullback was recorded.  The angiography was performed via power injection. The injected amount was 45 mL contrast at 14 mL/s. The PSI from the power injection was 800.

## 2022-11-13 NOTE — Clinical Note
105 ml of contrast were injected throughout the case. 95 mL of contrast was the total wasted during the case. 200 mL was the total amount used during the case.

## 2022-11-13 NOTE — HPI
51yo woman history of type 1 diabetes, obesity, HTN, HLD, GERD, CKD stage 2, glaucoma, migraines who presents with multiple episodes of hypoglycemia.  She states she passed out.  EMS was called multiple time.  She was also found to have hypothermia in the ED.   She is followed by Cherrie Rowland for diabetes.    Diabetes medications: Novolog sliding scale, basaglor (55 units bid and recently decreased to 54 units bid).  She states she has not started Farxiga nor has she started Toujeo.      Of note, she states she was having a squeezing pain in her left, lower chest.  She subsequently had a stress test and it was positive.  A LHC was scheduled for 11/15.  Case discussed with cardiology and patient can have the stress test while in-patient since she has hypothermia and intermittent symptoms.

## 2022-11-13 NOTE — LETTER
Fax Transmission                                                                                                                                                       Date: November 16, 2022       To:  Dee Dee Obregon MD From: Cecelia Crane, [facility]   Fax:  [phone] Fax:    Phone:  [phone] Phone: [phone]     Special Instructions:     **For questions or issues, please contact department listed on attached report.**                            CONFIDENTIALITY NOTICE:  The documents accompanying this telecopy transmission contain confidential information belonging to the sender. The information is intended only for the use of the individual or entity named above. If you are not the intended recipient you are hereby notified that any disclosure, copying, distribution or taking of any action in reliance on the contents of this telecopied information is strictly prohibited. If you have received this telecopy in error, please immediately notify this office by telephone at [phone].

## 2022-11-13 NOTE — ED PROVIDER NOTES
Encounter Date: 2022       History     Chief Complaint   Patient presents with    Hypoglycemia     52 year old female presents to the emergency department to be evaluated by EMS for hypoglycemia. EMS went to her home earlier this morning, her glucose read low. She received an ampule of 50 and her glucose increase to 107. EMS was called back to the home because her glucose was low again. It was 21 when EMS arrived. She received another ampule of D50. Her glucose increased to 185. Patient said that her insulin was recently decreased due to hypoglycemia, but she continues to have hypoglycemic episodes. Denies any runny nose, cough, fever, chills, chest pain, abdominal pain, nausea, vomiting, diarrhea. She said that she has only eaten a few bites of food today.    The history is provided by the patient.   Review of patient's allergies indicates:   Allergen Reactions    Lyrica [pregabalin] Other (See Comments)     Feels drunk     Past Medical History:   Diagnosis Date    CKD (chronic kidney disease), stage II     Diabetes mellitus type I     Disorder of kidney and ureter     GERD (gastroesophageal reflux disease)     Glaucoma     Goiter     HTN (hypertension)     Hyperlipidemia     Migraine headache without aura     Vitamin D deficiency      Past Surgical History:   Procedure Laterality Date    ANKLE FRACTURE SURGERY Right     ANKLE SURGERY Right     Plate and screws, fracture repair.    ANKLE SURGERY       SECTION      times 2    COLONOSCOPY  2017    Performed by Dr. Prescott.    HYSTERECTOMY      INJECTION OF FACET JOINT Bilateral 10/25/2018    Lumbar L3-S1 performed by Dr. Summers with Pain Treatment.    MYRINGOTOMY WITH INSERTION OF VENTILATION TUBE Bilateral 2021    Procedure: MYRINGOTOMY, WITH TYMPANOSTOMY TUBE INSERTION (T-TUBES);  Surgeon: Ki Gastelum MD;  Location: [facility];  Service: ENT;  Laterality: Bilateral;    ROTATOR CUFF REPAIR Bilateral     SINUS SURGERY      TENDON  RELEASE       Family History   Problem Relation Age of Onset    Heart disease Mother     Diabetes Mother     Thyroid disease Mother     Hypertension Mother     Diabetes Father     Hypertension Father     Cardiomyopathy Father     No Known Problems Brother     Arthritis Daughter     Asthma Daughter     Asthma Son     Heart disease Maternal Grandmother     Hypertension Paternal Grandmother      Social History     Tobacco Use    Smoking status: Never    Smokeless tobacco: Never   Substance Use Topics    Alcohol use: Yes    Drug use: Never     Review of Systems   Constitutional:  Negative for chills and fever.   Gastrointestinal:  Negative for constipation, diarrhea, nausea and vomiting.   All other systems reviewed and are negative.    Physical Exam     Initial Vitals   BP Pulse Resp Temp SpO2   11/13/22 1319 11/13/22 1319 11/13/22 1319 11/13/22 1322 11/13/22 1322   (!) 188/82 87 20 (!) 93.5 °F (34.2 °C) 100 %      MAP       --                Physical Exam    Vitals reviewed.  Constitutional: She appears well-developed and well-nourished.   HENT:   Head: Normocephalic and atraumatic.   Eyes: EOM are normal. Pupils are equal, round, and reactive to light.   Neck: Neck supple.   Cardiovascular:  Normal rate and regular rhythm.           Pulmonary/Chest: Breath sounds normal.   Abdominal: Abdomen is soft. Bowel sounds are normal. She exhibits no distension and no mass. There is no abdominal tenderness. There is no rebound and no guarding.   Musculoskeletal:         General: Normal range of motion.      Cervical back: Neck supple.     Neurological: She is alert and oriented to person, place, and time. She has normal strength. GCS score is 15. GCS eye subscore is 4. GCS verbal subscore is 5. GCS motor subscore is 6.   Skin: Skin is warm and dry. Capillary refill takes less than 2 seconds.   Psychiatric: She has a normal mood and affect.       Medical Screening Exam   See Full Note    ED Course   Procedures  Labs Reviewed    POCT GLUCOSE MONITORING CONTINUOUS - Abnormal; Notable for the following components:       Result Value    POC Glucose 67 (*)     All other components within normal limits   CULTURE, BLOOD   CULTURE, BLOOD   CBC W/ AUTO DIFFERENTIAL    Narrative:     The following orders were created for panel order CBC auto differential.  Procedure                               Abnormality         Status                     ---------                               -----------         ------                     CBC with Differential[[id number]]                                                         Please view results for these tests on the individual orders.   URINALYSIS   CBC WITH DIFFERENTIAL   COMPREHENSIVE METABOLIC PANEL   LACTIC ACID, PLASMA   URINALYSIS, REFLEX TO URINE CULTURE          Imaging Results              X-Ray Chest AP Portable (Final result)  Result time 11/13/22 13:51:44      Final result by Kirill Carroll DO (11/13/22 13:51:44)                   Impression:      No acute cardiopulmonary process demonstrated.    Point of Service: [facility]      Electronically signed by: Kirill Carroll  Date:    11/13/2022  Time:    13:51               Narrative:    EXAMINATION:  XR CHEST AP PORTABLE    CLINICAL HISTORY:  Sepsis;    COMPARISON:  Chest x-ray August 22, 2022    TECHNIQUE:  Frontal view/views of the chest.    FINDINGS:  Heart size appears within normal limits.  No focal consolidation, pleural effusion, or pneumothorax.  Visualized osseous and surrounding soft tissue structures demonstrate no acute abnormality.  Suspect prior distal right clavicular resection.                                       Medications   lactated ringers bolus 2,790 mL (2,790 mLs Intravenous New Bag 11/13/22 1336)                       Clinical Impression:   Final diagnoses:  [I10] Hypertension               Humaira Crane, FNP  11/14/22 9314

## 2022-11-13 NOTE — Clinical Note
The right groin was prepped. The site was prepped with ChloraPrep and chlorhexidine. The site was clipped. The patient was draped. The patient was positioned supine. The patient was secured using safety straps and with ulnar pads.

## 2022-11-13 NOTE — PROVIDER PROGRESS NOTES - EMERGENCY DEPT.
Encounter Date: 11/13/2022    ED Physician Progress Notes        Discussed with ; will admit to  for observation

## 2022-11-14 LAB
ANION GAP SERPL CALCULATED.3IONS-SCNC: 13 MMOL/L (ref 7–16)
BASOPHILS # BLD AUTO: 0.06 K/UL (ref 0–0.2)
BASOPHILS NFR BLD AUTO: 0.6 % (ref 0–1)
BUN SERPL-MCNC: 15 MG/DL (ref 7–18)
BUN/CREAT SERPL: 12 (ref 6–20)
CALCIUM SERPL-MCNC: 8.9 MG/DL (ref 8.5–10.1)
CHLORIDE SERPL-SCNC: 103 MMOL/L (ref 98–107)
CO2 SERPL-SCNC: 24 MMOL/L (ref 21–32)
CREAT SERPL-MCNC: 1.22 MG/DL (ref 0.55–1.02)
DIFFERENTIAL METHOD BLD: ABNORMAL
EGFR (NO RACE VARIABLE) (RUSH/TITUS): 54 ML/MIN/1.73M²
EOSINOPHIL # BLD AUTO: 0.18 K/UL (ref 0–0.5)
EOSINOPHIL NFR BLD AUTO: 1.7 % (ref 1–4)
ERYTHROCYTE [DISTWIDTH] IN BLOOD BY AUTOMATED COUNT: 14.1 % (ref 11.5–14.5)
GLUCOSE SERPL-MCNC: 215 MG/DL (ref 70–105)
GLUCOSE SERPL-MCNC: 235 MG/DL (ref 70–105)
GLUCOSE SERPL-MCNC: 250 MG/DL (ref 70–105)
GLUCOSE SERPL-MCNC: 253 MG/DL (ref 74–106)
GLUCOSE SERPL-MCNC: 292 MG/DL (ref 70–105)
GLUCOSE SERPL-MCNC: 304 MG/DL (ref 70–105)
GLUCOSE SERPL-MCNC: 343 MG/DL (ref 70–105)
GLUCOSE SERPL-MCNC: 371 MG/DL (ref 70–105)
GLUCOSE SERPL-MCNC: 379 MG/DL (ref 70–105)
GLUCOSE SERPL-MCNC: 399 MG/DL (ref 70–105)
GLUCOSE SERPL-MCNC: 435 MG/DL (ref 70–105)
HCT VFR BLD AUTO: 41.1 % (ref 38–47)
HGB BLD-MCNC: 12.9 G/DL (ref 12–16)
IMM GRANULOCYTES # BLD AUTO: 0.04 K/UL (ref 0–0.04)
IMM GRANULOCYTES NFR BLD: 0.4 % (ref 0–0.4)
LYMPHOCYTES # BLD AUTO: 2.33 K/UL (ref 1–4.8)
LYMPHOCYTES NFR BLD AUTO: 21.5 % (ref 27–41)
MAGNESIUM SERPL-MCNC: 2.2 MG/DL (ref 1.7–2.3)
MCH RBC QN AUTO: 24.1 PG (ref 27–31)
MCHC RBC AUTO-ENTMCNC: 31.4 G/DL (ref 32–36)
MCV RBC AUTO: 76.7 FL (ref 80–96)
MONOCYTES # BLD AUTO: 0.51 K/UL (ref 0–0.8)
MONOCYTES NFR BLD AUTO: 4.7 % (ref 2–6)
MPC BLD CALC-MCNC: 11.7 FL (ref 9.4–12.4)
NEUTROPHILS # BLD AUTO: 7.72 K/UL (ref 1.8–7.7)
NEUTROPHILS NFR BLD AUTO: 71.1 % (ref 53–65)
NRBC # BLD AUTO: 0 X10E3/UL
NRBC, AUTO (.00): 0 %
PHOSPHATE SERPL-MCNC: 3.1 MG/DL (ref 2.5–4.5)
PLATELET # BLD AUTO: 288 K/UL (ref 150–400)
POTASSIUM SERPL-SCNC: 5.1 MMOL/L (ref 3.5–5.1)
RBC # BLD AUTO: 5.36 M/UL (ref 4.2–5.4)
SODIUM SERPL-SCNC: 135 MMOL/L (ref 136–145)
WBC # BLD AUTO: 10.84 K/UL (ref 4.5–11)

## 2022-11-14 PROCEDURE — 96372 THER/PROPH/DIAG INJ SC/IM: CPT

## 2022-11-14 PROCEDURE — 36415 COLL VENOUS BLD VENIPUNCTURE: CPT | Performed by: HOSPITALIST

## 2022-11-14 PROCEDURE — 99226 PR SUBSEQUENT OBSERVATION CARE,LEVEL III: ICD-10-PCS | Mod: ,,, | Performed by: HOSPITALIST

## 2022-11-14 PROCEDURE — 94761 N-INVAS EAR/PLS OXIMETRY MLT: CPT

## 2022-11-14 PROCEDURE — G0378 HOSPITAL OBSERVATION PER HR: HCPCS

## 2022-11-14 PROCEDURE — 63600175 PHARM REV CODE 636 W HCPCS: Performed by: HOSPITALIST

## 2022-11-14 PROCEDURE — 99226 PR SUBSEQUENT OBSERVATION CARE,LEVEL III: CPT | Mod: ,,, | Performed by: HOSPITALIST

## 2022-11-14 PROCEDURE — 25000003 PHARM REV CODE 250: Performed by: HOSPITALIST

## 2022-11-14 PROCEDURE — 99214 OFFICE O/P EST MOD 30 MIN: CPT | Mod: ,,, | Performed by: INTERNAL MEDICINE

## 2022-11-14 PROCEDURE — 80048 BASIC METABOLIC PNL TOTAL CA: CPT | Performed by: HOSPITALIST

## 2022-11-14 PROCEDURE — 82962 GLUCOSE BLOOD TEST: CPT

## 2022-11-14 PROCEDURE — 85025 COMPLETE CBC W/AUTO DIFF WBC: CPT | Performed by: HOSPITALIST

## 2022-11-14 PROCEDURE — 84100 ASSAY OF PHOSPHORUS: CPT | Performed by: HOSPITALIST

## 2022-11-14 PROCEDURE — 83735 ASSAY OF MAGNESIUM: CPT | Performed by: HOSPITALIST

## 2022-11-14 PROCEDURE — 99214 PR OFFICE/OUTPT VISIT, EST, LEVL IV, 30-39 MIN: ICD-10-PCS | Mod: ,,, | Performed by: INTERNAL MEDICINE

## 2022-11-14 RX ORDER — GLUCAGON 1 MG
1 KIT INJECTION
Status: DISCONTINUED | OUTPATIENT
Start: 2022-11-14 | End: 2022-11-16

## 2022-11-14 RX ORDER — LISINOPRIL 40 MG/1
40 TABLET ORAL DAILY
Status: DISCONTINUED | OUTPATIENT
Start: 2022-11-14 | End: 2022-11-15

## 2022-11-14 RX ORDER — INSULIN ASPART 100 [IU]/ML
0-5 INJECTION, SOLUTION INTRAVENOUS; SUBCUTANEOUS
Status: DISCONTINUED | OUTPATIENT
Start: 2022-11-14 | End: 2022-11-16

## 2022-11-14 RX ADMIN — AMLODIPINE BESYLATE 5 MG: 5 TABLET ORAL at 09:11

## 2022-11-14 RX ADMIN — ACETAMINOPHEN 650 MG: 325 TABLET ORAL at 03:11

## 2022-11-14 RX ADMIN — INSULIN ASPART 3 UNITS: 100 INJECTION, SOLUTION INTRAVENOUS; SUBCUTANEOUS at 09:11

## 2022-11-14 RX ADMIN — ENOXAPARIN SODIUM 40 MG: 100 INJECTION SUBCUTANEOUS at 05:11

## 2022-11-14 RX ADMIN — INSULIN ASPART 4 UNITS: 100 INJECTION, SOLUTION INTRAVENOUS; SUBCUTANEOUS at 05:11

## 2022-11-14 RX ADMIN — METOPROLOL TARTRATE 25 MG: 25 TABLET, FILM COATED ORAL at 09:11

## 2022-11-14 RX ADMIN — HYDRALAZINE HYDROCHLORIDE 25 MG: 25 TABLET ORAL at 11:11

## 2022-11-14 RX ADMIN — INSULIN ASPART 5 UNITS: 100 INJECTION, SOLUTION INTRAVENOUS; SUBCUTANEOUS at 03:11

## 2022-11-14 NOTE — ASSESSMENT & PLAN NOTE
Body mass index is 33.38 kg/m². Morbid obesity complicates all aspects of disease management from diagnostic modalities to treatment. Weight loss encouraged and health benefits explained to patient.

## 2022-11-14 NOTE — HOSPITAL COURSE
11/14:  No further hypoglycemic episodes.  Patient will need follow-up with Endocrinology.    Cardiology consulted for further evaluation given that patient was scheduled for left heart catheterization for abnormal stress test.    11/15: s/p LHC. Non-obstructive CAD.     11/16: Patient is eager to be discharged today. Referral sent for Endocrinologist in [place] per patient's request. Advised patient that MD's nurse will have to review records prior to arranging appointment and reinforced the need for patient to also call their clinic for follow up. She will follow up with cardiology in 4 weeks and with PCP within 1 week of hospital d/c. Dr. Marcos completed patient's med rec for d/c and also reviewed medications in the room with the patient.    Patient counseled on the importance of keeping all hospital follow up appointments and compliance with medications, therapies, or devices as prescribed in order to provide for the best health outcomes and decrease the risk of hospital readmission. Additionally, patient given written literature regarding their current disease processes and home care recommendations. Patient given opportunity to ask questions and verbalized understanding of all information discussed.

## 2022-11-14 NOTE — HPI
Talia Lawrence is a 52 y.o. female with PMHx of type 1 diabetes, obesity, HTN, HLD, GERD, CKD stage 2, glaucoma, migraines. Pt presented with hypoglycemia and was found to be hypothermic. Pt has had c/o chest tightness and has been seen CARI Askew in clinic who sent pt for stress testing which was positive. Pt was scheduled for [facility] with Dr. Rosado tomorrow. Pt states she has had squeezing chest pain at rest, denies this chest pain with exertion. She denies SOB, diaphoresis, N/V, palpitations, edema, orthopnea, PND. She states the chest pain is relieved when she lays flat on her back and raises her arms above her head. She denies pain on exam today.     Troponin is negative. EKG shows NSR. NTpBNP is WNL. Echo from 08/2022 shows EF 70% with no significant wall motion abnormality and no significant valvular abnormalities.

## 2022-11-14 NOTE — ASSESSMENT & PLAN NOTE
Chest pain may be related to GERD based on location and description. Consider GI referral on discharge.

## 2022-11-14 NOTE — ASSESSMENT & PLAN NOTE
- not well controlled at present  - pt is only on amlodipine 5mg po daily and metoprolol 25mg po bid currently  - takes lotrel (amlodipine + benazapril) 5/40mg daily at home  - continue amlodipine 5mg po daily and metoprolol 25mg po bid  - add lisinopril 40mg po daily while here in the hospital (benazapril not on our hospital formulary)  - continue to monitor BP  - add/uptitrate meds as needed to control BP

## 2022-11-14 NOTE — ASSESSMENT & PLAN NOTE
Recurrent episodes of life-threatening hypoglycemia.   Stop long-acting insulin for now.   Allow blood glucose to run high.   Referral to endocrinology for proven strategies to better manage her blood pressure.   For now, stop all long acting insulin.  Sliding scale only.

## 2022-11-14 NOTE — ASSESSMENT & PLAN NOTE
Patient with chest pain that has features of GERD.   Abnormal stress test and plan for LHC on 11/15 as outpatient.    Cardiology consulted to consider LHC as in-patient.      Prior echo:     Results for orders placed during the hospital encounter of 08/22/22    Echo    Interpretation Summary  · Concentric remodeling and normal systolic function.  · The estimated ejection fraction is 70%.  · Normal left ventricular diastolic function.  · Mild pulmonic regurgitation.  · Normal right ventricular size with normal right ventricular systolic function.

## 2022-11-14 NOTE — CONSULTS
[facility]  Cardiology  Consult Note    Patient Name: Talia Lawrence  MRN: [id number]  Admission Date: 11/13/2022  Hospital Length of Stay: 0 days  Code Status: Full Code   Attending Provider: Maximus Marcos MD   Consulting Provider: STEPHANIE Frias  Primary Care Physician: Frank Cazares NP  Principal Problem:Hypoglycemia    Patient information was obtained from patient and ER records.     Inpatient consult to Cardiology  Consult performed by: STEPHANIE Frias  Consult ordered by: Maximus Marcos MD        Subjective:     Chief Complaint:  hypoglycemia     HPI:   Talia Lawrence is a 52 y.o. female with PMHx of type 1 diabetes, obesity, HTN, HLD, GERD, CKD stage 2, glaucoma, migraines. Pt presented with hypoglycemia and was found to be hypothermic. Pt has had c/o chest tightness and has been seen CARI Askew in clinic who sent pt for stress testing which was positive. Pt was scheduled for LHC with Dr. Rosado tomorrow. Pt states she has had squeezing chest pain at rest, denies this chest pain with exertion. She denies SOB, diaphoresis, N/V, palpitations, edema, orthopnea, PND. She states the chest pain is relieved when she lays flat on her back and raises her arms above her head. She denies pain on exam today.     Troponin is negative. EKG shows NSR. NTpBNP is WNL. Echo from 08/2022 shows EF 70% with no significant wall motion abnormality and no significant valvular abnormalities.                Past Medical History:   Diagnosis Date    CKD (chronic kidney disease), stage II     Diabetes mellitus type I     Disorder of kidney and ureter     GERD (gastroesophageal reflux disease)     Glaucoma     Goiter     HTN (hypertension)     Hyperlipidemia     Migraine headache without aura     Vitamin D deficiency        Past Surgical History:   Procedure Laterality Date    ANKLE FRACTURE SURGERY Right     ANKLE SURGERY Right     Plate and screws, fracture repair.     "ANKLE SURGERY       SECTION      times 2    COLONOSCOPY  2017    Performed by Dr. Prescott.    HYSTERECTOMY      INJECTION OF FACET JOINT Bilateral 10/25/2018    Lumbar L3-S1 performed by Dr. Summers with Pain Treatment.    MYRINGOTOMY WITH INSERTION OF VENTILATION TUBE Bilateral 2021    Procedure: MYRINGOTOMY, WITH TYMPANOSTOMY TUBE INSERTION (T-TUBES);  Surgeon: Ki Gastelum MD;  Location: [facility];  Service: ENT;  Laterality: Bilateral;    ROTATOR CUFF REPAIR Bilateral     SINUS SURGERY      TENDON RELEASE         Review of patient's allergies indicates:   Allergen Reactions    Lyrica [pregabalin] Other (See Comments)     Feels drunk       No current facility-administered medications on file prior to encounter.     Current Outpatient Medications on File Prior to Encounter   Medication Sig    albuterol (PROVENTIL/VENTOLIN HFA) 90 mcg/actuation inhaler Inhale 2 puffs into the lungs every 6 (six) hours as needed for Wheezing. Rescue    amLODIPine-benazepriL (LOTREL) 5-40 mg per capsule amlodipine 5 mg-benazepril 40 mg capsule   TAKE ONE CAPSULE BY MOUTH EVERY DAY    BD ULTRA-FINE CARLINE PEN NEEDLE 32 gauge x 5/32" Ndle Use to inject insulin 6 times daily.    blood sugar diagnostic (BLOOD GLUCOSE TEST) Strp To check blood sugar 6 times daily    blood-glucose meter (TRUE METRIX GLUCOSE METER MISC) by Misc.(Non-Drug; Combo Route) route. Use as directed    brimonidine 0.2% (ALPHAGAN) 0.2 % Drop Place 1 drop into both eyes 2 (two) times daily.    brompheniramin-phenylephrin-DM (RYNEX DM) 1-2.5-5 mg/5 mL Soln Take 5 mLs by mouth every 4 (four) hours as needed (cough). (Patient not taking: Reported on 2022)    cholecalciferol, vitamin D3, 125 mcg (5,000 unit) Tab Take 5,000 Units by mouth once daily.    dorzolamide-timolol 2-0.5% (COSOPT) 22.3-6.8 mg/mL ophthalmic solution instill one drop in affected eye twice a day.    flash glucose scanning reader (Supportie CARLOS 2 READER) [facility] Use as directed. "    flash glucose sensor (FREESTYLE CARLOS 2 SENSOR) Kit Apply sensor every 14 days as directed.    fluticasone propionate (FLONASE) 50 mcg/actuation nasal spray 2 sprays by Each Nostril route once daily.    fluticasone-salmeterol diskus inhaler 100-50 mcg Inhale 1 puff into the lungs 2 (two) times daily. Controller    glucagon HCL (GLUCAGON, HCL, EMERGENCY KIT) 1 mg SolR Inject as directed. Use as directed for hypoglycemia    hydrOXYzine pamoate (VISTARIL) 25 MG Cap Take 25 mg by mouth 3 (three) times daily as needed.    insulin aspart U-100 (NOVOLOG FLEXPEN U-100 INSULIN) 100 unit/mL (3 mL) InPn pen Inject sq following sliding scale not to exceed 50 units daily    insulin glargine U-300 conc (TOUJEO MAX U-300 SOLOSTAR) 300 unit/mL (3 mL) insulin pen 54 units sq twice daily.    metoclopramide HCl (REGLAN) 10 MG tablet Take 10 mg by mouth every 6 (six) hours as needed.    metoprolol succinate (TOPROL-XL) 25 MG 24 hr tablet Take 1 tablet (25 mg total) by mouth once daily.    nitroGLYCERIN (NITROSTAT) 0.4 MG SL tablet Place 1 tablet (0.4 mg total) under the tongue every 5 (five) minutes as needed for Chest pain.    ondansetron (ZOFRAN-ODT) 8 MG TbDL ondansetron 8 mg disintegrating tablet   DISSOLVE ONE TABLET ON TONGUE TWICE DAILY AS NEEDED    pantoprazole (PROTONIX) 40 MG tablet Take 2 tablets (80 mg total) by mouth once daily.    pravastatin (PRAVACHOL) 40 MG tablet Take 1 tablet (40 mg total) by mouth every evening.    promethazine (PHENERGAN) 25 MG tablet Take 25 mg by mouth every 4 (four) hours. One tablet every 8 hours prn for nausea or headache, no more than 2 in a day or 3 days a week, no driving when taking this medication.    traMADoL (ULTRAM) 50 mg tablet Take 1 tablet (50 mg total) by mouth every 12 (twelve) hours as needed for Pain (right ankle or hip pain).    UBROGEPANT 100 mg tablet TAKE ONE TABLET BY MOUTH AT ONSET of migraine, MAY REPEAT DOSE AFTER TWO hours. no more THAN TWO doses in APPLY 24 hour  period     Family History       Problem Relation (Age of Onset)    Arthritis Daughter    Asthma Daughter, Son    Cardiomyopathy Father    Diabetes Mother, Father    Heart disease Mother, Maternal Grandmother    Hypertension Mother, Father, Paternal Grandmother    No Known Problems Brother    Thyroid disease Mother          Tobacco Use    Smoking status: Never    Smokeless tobacco: Never   Substance and Sexual Activity    Alcohol use: Yes    Drug use: Never    Sexual activity: Not Currently     Review of Systems   Constitutional: Negative for diaphoresis.   Cardiovascular:  Positive for chest pain. Negative for irregular heartbeat, leg swelling, orthopnea, palpitations and paroxysmal nocturnal dyspnea.   Respiratory:  Negative for shortness of breath.    Gastrointestinal:  Negative for nausea and vomiting.   Objective:     Vital Signs (Most Recent):  Temp: 97.9 °F (36.6 °C) (11/14/22 1149)  Pulse: 61 (11/14/22 1149)  Resp: 17 (11/14/22 1149)  BP: (!) 164/65 (11/14/22 1149)  SpO2: 99 % (11/14/22 1149)   Vital Signs (24h Range):  Temp:  [97.1 °F (36.2 °C)-98.2 °F (36.8 °C)] 97.9 °F (36.6 °C)  Pulse:  [61-80] 61  Resp:  [17-21] 17  SpO2:  [96 %-100 %] 99 %  BP: (117-186)/(53-84) 164/65     Weight: 93.8 kg (206 lb 12.7 oz)  Body mass index is 33.38 kg/m².    SpO2: 99 %  O2 Device (Oxygen Therapy): room air      Intake/Output Summary (Last 24 hours) at 11/14/2022 1401  Last data filed at 11/13/2022 1604  Gross per 24 hour   Intake 1500 ml   Output --   Net 1500 ml       Lines/Drains/Airways       Peripheral Intravenous Line  Duration                  Peripheral IV - Single Lumen 11/13/22 1310 20 G Posterior;Right Hand 1 day                    Physical Exam  Vitals reviewed.   Constitutional:       General: She is not in acute distress.     Appearance: Normal appearance.   HENT:      Mouth/Throat:      Mouth: Mucous membranes are moist.   Eyes:      Pupils: Pupils are equal, round, and reactive to light.   Cardiovascular:       Rate and Rhythm: Normal rate and regular rhythm.      Pulses: Normal pulses.      Heart sounds: Normal heart sounds.   Pulmonary:      Effort: Pulmonary effort is normal.      Breath sounds: Normal breath sounds.   Abdominal:      Palpations: Abdomen is soft.   Musculoskeletal:      Cervical back: Neck supple. No rigidity.   Skin:     General: Skin is warm and dry.   Neurological:      Mental Status: She is alert and oriented to person, place, and time.   Psychiatric:         Mood and Affect: Mood normal.         Behavior: Behavior normal.       Significant Labs: All pertinent lab results from the last 24 hours have been reviewed. and   Recent Lab Results  (Last 5 results in the past 24 hours)        11/14/22  1151   11/14/22  1126   11/14/22  0815   11/14/22  0741   11/14/22  0617        Anion Gap       13         Baso #   0.06             Basophil %   0.6             BUN       15         BUN/CREAT RATIO       12         Calcium       8.9         Chloride       103         CO2       24         Creatinine       1.22         Differential Type   Auto             eGFR       54         Eos #   0.18             Eosinophil %   1.7             Glucose       253         Hematocrit   41.1             Hemoglobin   12.9             Immature Grans (Abs)   0.04             Immature Granulocytes   0.4             Lymph #   2.33             Lymph %   21.5             Magnesium       2.2         MCH   24.1             MCHC   31.4             MCV   76.7             Mono #   0.51             Mono %   4.7             MPV   11.7             Neutrophils, Abs   7.72             Neutrophils Relative   71.1             nRBC   0.0             NUCLEATED RBC ABSOLUTE   0.00             Phosphorus       3.1         Platelets   288             POC Glucose 343     292     215       Potassium       5.1         RBC   5.36             RDW   14.1             Sodium       135         WBC   10.84                                    Significant  Imaging: Echocardiogram: Transthoracic echo (TTE) complete (Cupid Only):   Results for orders placed or performed during the hospital encounter of 08/22/22   Echo   Result Value Ref Range    BSA 2.12 m2    TDI SEPTAL 0.09 m/s    LV LATERAL E/E' RATIO 6.50 m/s    LV SEPTAL E/E' RATIO 8.67 m/s    IVC diameter 1.47 cm    EF 70 %    AORTIC VALVE CUSP SEPERATION [id number] cm    TDI LATERAL 0.12 m/s    LVIDd 3.94 3.5 - 6.0 cm    IVS 0.92 0.6 - 1.1 cm    Posterior Wall 1.05 0.6 - 1.1 cm    LVIDs 2.08 (A) 2.1 - 4.0 cm    FS 47 28 - 44 %    LV mass 121.46 g    LA size 2.96 cm    Left Ventricle Relative Wall Thickness 0.53 cm    AV mean gradient 5 mmHg    AV valve area 2.31 cm2    AV index (prosthetic) 0.74     E/A ratio 1.00     Mean e' 0.11 m/s    E wave deceleration time 192.00 msec    LVOT diameter 2.00 cm    LVOT area 3.1 cm2    LVOT peak VTI 22.67 cm    Ao peak ronn 1.5 m/s    Ao VTI 30.75 cm    LVOT stroke volume 71.18 cm3    AV peak gradient 9 mmHg    E/E' ratio 7.43 m/s    MV Peak E Ronn 0.78 m/s    MV Peak A Ronn 0.78 m/s    LV Systolic Volume 14.06 mL    LV Systolic Volume Index 6.9 mL/m2    LV Diastolic Volume 67.53 mL    LV Diastolic Volume Index 32.94 mL/m2    LV Mass Index 59 g/m2    Narrative    · Concentric remodeling and normal systolic function.  · The estimated ejection fraction is 70%.  · Normal left ventricular diastolic function.  · Mild pulmonic regurgitation.  · Normal right ventricular size with normal right ventricular systolic   function.        Assessment and Plan:     * Hypoglycemia  - primary team managing    Abnormal stress test  - keep NPO after midnight for [facility] tomorrow    Stage 2 chronic kidney disease due to type 2 diabetes mellitus  - creatinine today is 1.22  - continue to monitor    Hypertensive disorder  - not well controlled at present  - pt is only on amlodipine 5mg po daily and metoprolol 25mg po bid currently  - takes lotrel (amlodipine + benazapril) 5/40mg daily at home  -  continue amlodipine 5mg po daily and metoprolol 25mg po bid  - add lisinopril 40mg po daily while here in the hospital (benazapril not on our hospital formulary)  - continue to monitor BP  - add/uptitrate meds as needed to control BP        VTE Risk Mitigation (From admission, onward)           Ordered     enoxaparin injection 40 mg  Daily         11/13/22 1714     IP VTE HIGH RISK PATIENT  Once         11/13/22 1714     Place sequential compression device  Until discontinued         11/13/22 1714                    Thank you for your consult. I will follow-up with patient. Please contact us if you have any additional questions.    Patience Molina, SHANIQUEP  Cardiology   [facility]  PT seen and examined, chart reviewed, essentially agree with findings as documented, corrections made as necessary.

## 2022-11-14 NOTE — SUBJECTIVE & OBJECTIVE
"Past Medical History:   Diagnosis Date    CKD (chronic kidney disease), stage II     Diabetes mellitus type I     Disorder of kidney and ureter     GERD (gastroesophageal reflux disease)     Glaucoma     Goiter     HTN (hypertension)     Hyperlipidemia     Migraine headache without aura     Vitamin D deficiency        Past Surgical History:   Procedure Laterality Date    ANKLE FRACTURE SURGERY Right     ANKLE SURGERY Right     Plate and screws, fracture repair.    ANKLE SURGERY       SECTION      times 2    COLONOSCOPY  2017    Performed by Dr. Prescott.    HYSTERECTOMY      INJECTION OF FACET JOINT Bilateral 10/25/2018    Lumbar L3-S1 performed by Dr. Summers with Pain Treatment.    MYRINGOTOMY WITH INSERTION OF VENTILATION TUBE Bilateral 2021    Procedure: MYRINGOTOMY, WITH TYMPANOSTOMY TUBE INSERTION (T-TUBES);  Surgeon: Ki Gastelum MD;  Location: [facility], [place];  Service: ENT;  Laterality: Bilateral;    ROTATOR CUFF REPAIR Bilateral     SINUS SURGERY      TENDON RELEASE         Review of patient's allergies indicates:   Allergen Reactions    Lyrica [pregabalin] Other (See Comments)     Feels drunk       No current facility-administered medications on file prior to encounter.     Current Outpatient Medications on File Prior to Encounter   Medication Sig    albuterol (PROVENTIL/VENTOLIN HFA) 90 mcg/actuation inhaler Inhale 2 puffs into the lungs every 6 (six) hours as needed for Wheezing. Rescue    amLODIPine-benazepriL (LOTREL) 5-40 mg per capsule amlodipine 5 mg-benazepril 40 mg capsule   TAKE ONE CAPSULE BY MOUTH EVERY DAY    BD ULTRA-FINE CARLINE PEN NEEDLE 32 gauge x " Ndle Use to inject insulin 6 times daily.    blood sugar diagnostic (BLOOD GLUCOSE TEST) Strp To check blood sugar 6 times daily    blood-glucose meter (TRUE METRIX GLUCOSE METER MISC) by Misc.(Non-Drug; Combo Route) route. Use as directed    brimonidine 0.2% (ALPHAGAN) 0.2 % Drop Place 1 drop into both eyes 2 (two) times daily. "    brompheniramin-phenylephrin-DM (RYNEX DM) 1-2.5-5 mg/5 mL Soln Take 5 mLs by mouth every 4 (four) hours as needed (cough). (Patient not taking: Reported on 11/2/2022)    cholecalciferol, vitamin D3, 125 mcg (5,000 unit) Tab Take 5,000 Units by mouth once daily.    dorzolamide-timolol 2-0.5% (COSOPT) 22.3-6.8 mg/mL ophthalmic solution instill one drop in affected eye twice a day.    flash glucose scanning reader (FREESTYLE CARLOS 2 READER) [facility] Use as directed.    flash glucose sensor (FREESTYLE CARLOS 2 SENSOR) Kit Apply sensor every 14 days as directed.    fluticasone propionate (FLONASE) 50 mcg/actuation nasal spray 2 sprays by Each Nostril route once daily.    fluticasone-salmeterol diskus inhaler 100-50 mcg Inhale 1 puff into the lungs 2 (two) times daily. Controller    glucagon HCL (GLUCAGON, HCL, EMERGENCY KIT) 1 mg SolR Inject as directed. Use as directed for hypoglycemia    hydrOXYzine pamoate (VISTARIL) 25 MG Cap Take 25 mg by mouth 3 (three) times daily as needed.    insulin aspart U-100 (NOVOLOG FLEXPEN U-100 INSULIN) 100 unit/mL (3 mL) InPn pen Inject sq following sliding scale not to exceed 50 units daily    insulin glargine U-300 conc (TOUJEO MAX U-300 SOLOSTAR) 300 unit/mL (3 mL) insulin pen 54 units sq twice daily.    metoclopramide HCl (REGLAN) 10 MG tablet Take 10 mg by mouth every 6 (six) hours as needed.    metoprolol succinate (TOPROL-XL) 25 MG 24 hr tablet Take 1 tablet (25 mg total) by mouth once daily.    nitroGLYCERIN (NITROSTAT) 0.4 MG SL tablet Place 1 tablet (0.4 mg total) under the tongue every 5 (five) minutes as needed for Chest pain.    ondansetron (ZOFRAN-ODT) 8 MG TbDL ondansetron 8 mg disintegrating tablet   DISSOLVE ONE TABLET ON TONGUE TWICE DAILY AS NEEDED    pantoprazole (PROTONIX) 40 MG tablet Take 2 tablets (80 mg total) by mouth once daily.    pravastatin (PRAVACHOL) 40 MG tablet Take 1 tablet (40 mg total) by mouth every evening.    promethazine (PHENERGAN) 25 MG tablet  Take 25 mg by mouth every 4 (four) hours. One tablet every 8 hours prn for nausea or headache, no more than 2 in a day or 3 days a week, no driving when taking this medication.    traMADoL (ULTRAM) 50 mg tablet Take 1 tablet (50 mg total) by mouth every 12 (twelve) hours as needed for Pain (right ankle or hip pain).    UBROGEPANT 100 mg tablet TAKE ONE TABLET BY MOUTH AT ONSET of migraine, MAY REPEAT DOSE AFTER TWO hours. no more THAN TWO doses in APPLY 24 hour period     Family History       Problem Relation (Age of Onset)    Arthritis Daughter    Asthma Daughter, Son    Cardiomyopathy Father    Diabetes Mother, Father    Heart disease Mother, Maternal Grandmother    Hypertension Mother, Father, Paternal Grandmother    No Known Problems Brother    Thyroid disease Mother          Tobacco Use    Smoking status: Never    Smokeless tobacco: Never   Substance and Sexual Activity    Alcohol use: Yes    Drug use: Never    Sexual activity: Not Currently     Review of Systems   Constitutional: Negative for diaphoresis.   Cardiovascular:  Positive for chest pain. Negative for irregular heartbeat, leg swelling, orthopnea, palpitations and paroxysmal nocturnal dyspnea.   Respiratory:  Negative for shortness of breath.    Gastrointestinal:  Negative for nausea and vomiting.   Objective:     Vital Signs (Most Recent):  Temp: 97.9 °F (36.6 °C) (11/14/22 1149)  Pulse: 61 (11/14/22 1149)  Resp: 17 (11/14/22 1149)  BP: (!) 164/65 (11/14/22 1149)  SpO2: 99 % (11/14/22 1149)   Vital Signs (24h Range):  Temp:  [97.1 °F (36.2 °C)-98.2 °F (36.8 °C)] 97.9 °F (36.6 °C)  Pulse:  [61-80] 61  Resp:  [17-21] 17  SpO2:  [96 %-100 %] 99 %  BP: (117-186)/(53-84) 164/65     Weight: 93.8 kg (206 lb 12.7 oz)  Body mass index is 33.38 kg/m².    SpO2: 99 %  O2 Device (Oxygen Therapy): room air      Intake/Output Summary (Last 24 hours) at 11/14/2022 1401  Last data filed at 11/13/2022 1604  Gross per 24 hour   Intake 1500 ml   Output --   Net 1500 ml        Lines/Drains/Airways       Peripheral Intravenous Line  Duration                  Peripheral IV - Single Lumen 11/13/22 1310 20 G Posterior;Right Hand 1 day                    Physical Exam  Vitals reviewed.   Constitutional:       General: She is not in acute distress.     Appearance: Normal appearance.   HENT:      Mouth/Throat:      Mouth: Mucous membranes are moist.   Eyes:      Pupils: Pupils are equal, round, and reactive to light.   Cardiovascular:      Rate and Rhythm: Normal rate and regular rhythm.      Pulses: Normal pulses.      Heart sounds: Normal heart sounds.   Pulmonary:      Effort: Pulmonary effort is normal.      Breath sounds: Normal breath sounds.   Abdominal:      Palpations: Abdomen is soft.   Musculoskeletal:      Cervical back: Neck supple. No rigidity.   Skin:     General: Skin is warm and dry.   Neurological:      Mental Status: She is alert and oriented to person, place, and time.   Psychiatric:         Mood and Affect: Mood normal.         Behavior: Behavior normal.       Significant Labs: All pertinent lab results from the last 24 hours have been reviewed. and   Recent Lab Results  (Last 5 results in the past 24 hours)        11/14/22  1151   11/14/22  1126   11/14/22  0815   11/14/22  0741   11/14/22  0617        Anion Gap       13         Baso #   0.06             Basophil %   0.6             BUN       15         BUN/CREAT RATIO       12         Calcium       8.9         Chloride       103         CO2       24         Creatinine       1.22         Differential Type   Auto             eGFR       54         Eos #   0.18             Eosinophil %   1.7             Glucose       253         Hematocrit   41.1             Hemoglobin   12.9             Immature Grans (Abs)   0.04             Immature Granulocytes   0.4             Lymph #   2.33             Lymph %   21.5             Magnesium       2.2         MCH   24.1             MCHC   31.4             MCV   76.7             Mono  #   0.51             Mono %   4.7             MPV   11.7             Neutrophils, Abs   7.72             Neutrophils Relative   71.1             nRBC   0.0             NUCLEATED RBC ABSOLUTE   0.00             Phosphorus       3.1         Platelets   288             POC Glucose 343     292     215       Potassium       5.1         RBC   5.36             RDW   14.1             Sodium       135         WBC   10.84                                    Significant Imaging: Echocardiogram: Transthoracic echo (TTE) complete (Cupid Only):   Results for orders placed or performed during the hospital encounter of 08/22/22   Echo   Result Value Ref Range    BSA 2.12 m2    TDI SEPTAL 0.09 m/s    LV LATERAL E/E' RATIO 6.50 m/s    LV SEPTAL E/E' RATIO 8.67 m/s    IVC diameter 1.47 cm    EF 70 %    AORTIC VALVE CUSP SEPERATION [id number] cm    TDI LATERAL 0.12 m/s    LVIDd 3.94 3.5 - 6.0 cm    IVS 0.92 0.6 - 1.1 cm    Posterior Wall 1.05 0.6 - 1.1 cm    LVIDs 2.08 (A) 2.1 - 4.0 cm    FS 47 28 - 44 %    LV mass 121.46 g    LA size 2.96 cm    Left Ventricle Relative Wall Thickness 0.53 cm    AV mean gradient 5 mmHg    AV valve area 2.31 cm2    AV index (prosthetic) 0.74     E/A ratio 1.00     Mean e' 0.11 m/s    E wave deceleration time 192.00 msec    LVOT diameter 2.00 cm    LVOT area 3.1 cm2    LVOT peak VTI 22.67 cm    Ao peak ronn 1.5 m/s    Ao VTI 30.75 cm    LVOT stroke volume 71.18 cm3    AV peak gradient 9 mmHg    E/E' ratio 7.43 m/s    MV Peak E Ronn 0.78 m/s    MV Peak A Ronn 0.78 m/s    LV Systolic Volume 14.06 mL    LV Systolic Volume Index 6.9 mL/m2    LV Diastolic Volume 67.53 mL    LV Diastolic Volume Index 32.94 mL/m2    LV Mass Index 59 g/m2    Narrative    · Concentric remodeling and normal systolic function.  · The estimated ejection fraction is 70%.  · Normal left ventricular diastolic function.  · Mild pulmonic regurgitation.  · Normal right ventricular size with normal right ventricular systolic    function.

## 2022-11-14 NOTE — NURSING
NOTIFIED DR. FOSTER OF PT GLUCOSE AND HER CONCERNS FOR NO SLIDING SCALE.    pt admitted for hypoglycemia; checking blood glucose q2h; current glucose 233. pt is asking abut sliding scale. no orders for sliding scale.     PER DR. FOSTER RESPONSE WE WILL CONTINUE TO MONITOR WITH NO INTERVENTION DUE TO HER HYPOGLYCEMIC EPISODES.

## 2022-11-14 NOTE — ASSESSMENT & PLAN NOTE
Patient's FSGs are uncontrolled due to hypoglycemia on current medication regimen.  Last A1c reviewed-   Lab Results   Component Value Date    HGBA1C 7.5 (A) 11/02/2022     Most recent fingerstick glucose reviewed- No results for input(s): POCTGLUCOSE in the last 24 hours.  Current correctional scale  Low  Decrease anti-hyperglycemic dose as follows-   Antihyperglycemics (From admission, onward)    None        Hold Oral hypoglycemics while patient is in the hospital.

## 2022-11-14 NOTE — SUBJECTIVE & OBJECTIVE
"Past Medical History:   Diagnosis Date    CKD (chronic kidney disease), stage II     Diabetes mellitus type I     Disorder of kidney and ureter     GERD (gastroesophageal reflux disease)     Glaucoma     Goiter     HTN (hypertension)     Hyperlipidemia     Migraine headache without aura     Vitamin D deficiency        Past Surgical History:   Procedure Laterality Date    ANKLE FRACTURE SURGERY Right     ANKLE SURGERY Right     Plate and screws, fracture repair.    ANKLE SURGERY       SECTION      times 2    COLONOSCOPY  2017    Performed by Dr. Prescott.    HYSTERECTOMY      INJECTION OF FACET JOINT Bilateral 10/25/2018    Lumbar L3-S1 performed by Dr. Summers with Pain Treatment.    MYRINGOTOMY WITH INSERTION OF VENTILATION TUBE Bilateral 2021    Procedure: MYRINGOTOMY, WITH TYMPANOSTOMY TUBE INSERTION (T-TUBES);  Surgeon: Ki Gastelum MD;  Location: [facility], [place];  Service: ENT;  Laterality: Bilateral;    ROTATOR CUFF REPAIR Bilateral     SINUS SURGERY      TENDON RELEASE         Review of patient's allergies indicates:   Allergen Reactions    Lyrica [pregabalin] Other (See Comments)     Feels drunk       No current facility-administered medications on file prior to encounter.     Current Outpatient Medications on File Prior to Encounter   Medication Sig    albuterol (PROVENTIL/VENTOLIN HFA) 90 mcg/actuation inhaler Inhale 2 puffs into the lungs every 6 (six) hours as needed for Wheezing. Rescue    amLODIPine-benazepriL (LOTREL) 5-40 mg per capsule amlodipine 5 mg-benazepril 40 mg capsule   TAKE ONE CAPSULE BY MOUTH EVERY DAY    BD ULTRA-FINE CARLINE PEN NEEDLE 32 gauge x " Ndle Use to inject insulin 6 times daily.    blood sugar diagnostic (BLOOD GLUCOSE TEST) Strp To check blood sugar 6 times daily    blood-glucose meter (TRUE METRIX GLUCOSE METER MISC) by Misc.(Non-Drug; Combo Route) route. Use as directed    brimonidine 0.2% (ALPHAGAN) 0.2 % Drop Place 1 drop into both eyes 2 (two) times daily. "    brompheniramin-phenylephrin-DM (RYNEX DM) 1-2.5-5 mg/5 mL Soln Take 5 mLs by mouth every 4 (four) hours as needed (cough). (Patient not taking: Reported on 11/2/2022)    cholecalciferol, vitamin D3, 125 mcg (5,000 unit) Tab Take 5,000 Units by mouth once daily.    dorzolamide-timolol 2-0.5% (COSOPT) 22.3-6.8 mg/mL ophthalmic solution instill one drop in affected eye twice a day.    flash glucose scanning reader (FREESTYLE CARLOS 2 READER) [facility] [place].    flash glucose sensor (FREESTYLE CARLOS 2 SENSOR) Kit Apply sensor every 14 days as directed.    fluticasone propionate (FLONASE) 50 mcg/actuation nasal spray 2 sprays by Each Nostril route once daily.    fluticasone-salmeterol diskus inhaler 100-50 mcg Inhale 1 puff into the lungs 2 (two) times daily. Controller    glucagon HCL (GLUCAGON, HCL, EMERGENCY KIT) 1 mg SolR Inject as directed. Use as directed for hypoglycemia    hydrOXYzine pamoate (VISTARIL) 25 MG Cap Take 25 mg by mouth 3 (three) times daily as needed.    insulin aspart U-100 (NOVOLOG FLEXPEN U-100 INSULIN) 100 unit/mL (3 mL) InPn pen Inject sq following sliding scale not to exceed 50 units daily    insulin glargine U-300 conc (TOUJEO MAX U-300 SOLOSTAR) 300 unit/mL (3 mL) insulin pen 54 units sq twice daily.    metoclopramide HCl (REGLAN) 10 MG tablet Take 10 mg by mouth every 6 (six) hours as needed.    metoprolol succinate (TOPROL-XL) 25 MG 24 hr tablet Take 1 tablet (25 mg total) by mouth once daily.    nitroGLYCERIN (NITROSTAT) 0.4 MG SL tablet Place 1 tablet (0.4 mg total) under the tongue every 5 (five) minutes as needed for Chest pain.    ondansetron (ZOFRAN-ODT) 8 MG TbDL ondansetron 8 mg disintegrating tablet   DISSOLVE ONE TABLET ON TONGUE TWICE DAILY AS NEEDED    pantoprazole (PROTONIX) 40 MG tablet Take 2 tablets (80 mg total) by mouth once daily.    pravastatin (PRAVACHOL) 40 MG tablet Take 1 tablet (40 mg total) by mouth every evening.    promethazine (PHENERGAN) 25 MG tablet  Take 25 mg by mouth every 4 (four) hours. One tablet every 8 hours prn for nausea or headache, no more than 2 in a day or 3 days a week, no driving when taking this medication.    traMADoL (ULTRAM) 50 mg tablet Take 1 tablet (50 mg total) by mouth every 12 (twelve) hours as needed for Pain (right ankle or hip pain).    UBROGEPANT 100 mg tablet TAKE ONE TABLET BY MOUTH AT ONSET of migraine, MAY REPEAT DOSE AFTER TWO hours. no more THAN TWO doses in APPLY 24 hour period     Family History       Problem Relation (Age of Onset)    Arthritis Daughter    Asthma Daughter, Son    Cardiomyopathy Father    Diabetes Mother, Father    Heart disease Mother, Maternal Grandmother    Hypertension Mother, Father, Paternal Grandmother    No Known Problems Brother    Thyroid disease Mother          Tobacco Use    Smoking status: Never    Smokeless tobacco: Never   Substance and Sexual Activity    Alcohol use: Yes    Drug use: Never    Sexual activity: Not Currently     Review of Systems   Constitutional:  Negative for activity change, chills, fatigue and fever.   HENT:  Negative for congestion and sore throat.    Respiratory:  Negative for chest tightness.    Gastrointestinal:  Positive for nausea. Negative for abdominal distention, abdominal pain and diarrhea.   Endocrine: Negative for cold intolerance and heat intolerance.   Genitourinary:  Negative for dysuria.   Musculoskeletal:  Negative for arthralgias and back pain.   Skin:  Negative for color change and rash.   Neurological:  Positive for weakness. Negative for dizziness, tremors and headaches.   Psychiatric/Behavioral:  Negative for agitation. The patient is not nervous/anxious.    Objective:     Vital Signs (Most Recent):  Temp: 98.2 °F (36.8 °C) (11/13/22 1819)  Pulse: 70 (11/13/22 1819)  Resp: 18 (11/13/22 1819)  BP: (!) 167/55 (11/13/22 1819)  SpO2: 99 % (11/13/22 1829)   Vital Signs (24h Range):  Temp:  [93.5 °F (34.2 °C)-98.2 °F (36.8 °C)] 98.2 °F (36.8 °C)  Pulse:   [70-87] 70  Resp:  [18-20] 18  SpO2:  [99 %-100 %] 99 %  BP: (167-188)/(55-84) 167/55     Weight: 93.8 kg (206 lb 12.7 oz)  Body mass index is 33.38 kg/m².    Physical Exam  Constitutional:       Appearance: She is obese.   HENT:      Head: Normocephalic and atraumatic.      Nose: Nose normal.      Mouth/Throat:      Mouth: Mucous membranes are moist.      Pharynx: Oropharynx is clear.   Eyes:      Extraocular Movements: Extraocular movements intact.      Conjunctiva/sclera: Conjunctivae normal.      Pupils: Pupils are equal, round, and reactive to light.   Cardiovascular:      Rate and Rhythm: Normal rate and regular rhythm.      Pulses: Normal pulses.      Heart sounds: Normal heart sounds.   Pulmonary:      Effort: Pulmonary effort is normal.      Breath sounds: Normal breath sounds.   Abdominal:      General: Abdomen is flat. Bowel sounds are normal.      Palpations: Abdomen is soft.   Musculoskeletal:         General: Normal range of motion.      Cervical back: Normal range of motion and neck supple.   Skin:     General: Skin is warm and dry.   Neurological:      General: No focal deficit present.      Mental Status: She is alert and oriented to person, place, and time.      Motor: Weakness present.   Psychiatric:         Mood and Affect: Mood normal.         Behavior: Behavior normal.         CRANIAL NERVES     CN III, IV, VI   Pupils are equal, round, and reactive to light.     Significant Labs: All pertinent labs within the past 24 hours have been reviewed.    Significant Imaging: I have reviewed all pertinent imaging results/findings within the past 24 hours.

## 2022-11-14 NOTE — H&P
[facility]  [facility]  History & Physical    Patient Name: Talia Lawrence  MRN: [id number]  Patient Class: OP- Observation  Admission Date: 2022  Attending Physician: Maximus Marcos MD   Primary Care Provider: Frank Cazares NP         Patient information was obtained from patient, past medical records and ER records.     Subjective:     Principal Problem:Hypoglycemia    Chief Complaint:   Chief Complaint   Patient presents with    Hypoglycemia        HPI: 53yo woman history of type 1 diabetes, obesity, HTN, HLD, GERD, CKD stage 2, glaucoma, migraines who presents with multiple episodes of hypoglycemia.  She states she passed out.  EMS was called multiple time.  She was also found to have hypothermia in the ED.   She is followed by Cherrie Rowland for diabetes.    Diabetes medications: Novolog sliding scale, basaglor (55 units bid and recently decreased to 54 units bid).  She states she has not started Farxiga nor has she started Toujeo.      Of note, she states she was having a squeezing pain in her left, lower chest.  She subsequently had a stress test and it was positive.  A [facility] was scheduled for 11/15.  Case discussed with cardiology and patient can have the stress test while in-patient since she has hypothermia and intermittent symptoms.        Past Medical History:   Diagnosis Date    CKD (chronic kidney disease), stage II     Diabetes mellitus type I     Disorder of kidney and ureter     GERD (gastroesophageal reflux disease)     Glaucoma     Goiter     HTN (hypertension)     Hyperlipidemia     Migraine headache without aura     Vitamin D deficiency        Past Surgical History:   Procedure Laterality Date    ANKLE FRACTURE SURGERY Right     ANKLE SURGERY Right     Plate and screws, fracture repair.    ANKLE SURGERY       SECTION      times 2    COLONOSCOPY  2017    Performed by Dr. Prescott.    HYSTERECTOMY      INJECTION OF FACET  "JOINT Bilateral 10/25/2018    Lumbar L3-S1 performed by Dr. Summers with Pain Treatment.    MYRINGOTOMY WITH INSERTION OF VENTILATION TUBE Bilateral 7/27/2021    Procedure: MYRINGOTOMY, WITH TYMPANOSTOMY TUBE INSERTION (T-TUBES);  Surgeon: Ki Gastelum MD;  Location: [facility];  Service: ENT;  Laterality: Bilateral;    ROTATOR CUFF REPAIR Bilateral     SINUS SURGERY      TENDON RELEASE         Review of patient's allergies indicates:   Allergen Reactions    Lyrica [pregabalin] Other (See Comments)     Feels drunk       No current facility-administered medications on file prior to encounter.     Current Outpatient Medications on File Prior to Encounter   Medication Sig    albuterol (PROVENTIL/VENTOLIN HFA) 90 mcg/actuation inhaler Inhale 2 puffs into the lungs every 6 (six) hours as needed for Wheezing. Rescue    amLODIPine-benazepriL (LOTREL) 5-40 mg per capsule amlodipine 5 mg-benazepril 40 mg capsule   TAKE ONE CAPSULE BY MOUTH EVERY DAY    BD ULTRA-FINE CARLINE PEN NEEDLE 32 gauge x 5/32" Ndle Use to inject insulin 6 times daily.    blood sugar diagnostic (BLOOD GLUCOSE TEST) Strp To check blood sugar 6 times daily    blood-glucose meter (TRUE METRIX GLUCOSE METER MISC) by Misc.(Non-Drug; Combo Route) route. Use as directed    brimonidine 0.2% (ALPHAGAN) 0.2 % Drop Place 1 drop into both eyes 2 (two) times daily.    brompheniramin-phenylephrin-DM (RYNEX DM) 1-2.5-5 mg/5 mL Soln Take 5 mLs by mouth every 4 (four) hours as needed (cough). (Patient not taking: Reported on 11/2/2022)    cholecalciferol, vitamin D3, 125 mcg (5,000 unit) Tab Take 5,000 Units by mouth once daily.    dorzolamide-timolol 2-0.5% (COSOPT) 22.3-6.8 mg/mL ophthalmic solution instill one drop in affected eye twice a day.    flash glucose scanning reader (MedypalYLE CARLOS 2 READER) [facility] Use as directed.    flash glucose sensor (FREESTYLE CARLOS 2 SENSOR) Kit Apply sensor every 14 days as directed.    fluticasone propionate " (FLONASE) 50 mcg/actuation nasal spray 2 sprays by Each Nostril route once daily.    fluticasone-salmeterol diskus inhaler 100-50 mcg Inhale 1 puff into the lungs 2 (two) times daily. Controller    glucagon HCL (GLUCAGON, HCL, EMERGENCY KIT) 1 mg SolR Inject as directed. Use as directed for hypoglycemia    hydrOXYzine pamoate (VISTARIL) 25 MG Cap Take 25 mg by mouth 3 (three) times daily as needed.    insulin aspart U-100 (NOVOLOG FLEXPEN U-100 INSULIN) 100 unit/mL (3 mL) InPn pen Inject sq following sliding scale not to exceed 50 units daily    insulin glargine U-300 conc (TOUJEO MAX U-300 SOLOSTAR) 300 unit/mL (3 mL) insulin pen 54 units sq twice daily.    metoclopramide HCl (REGLAN) 10 MG tablet Take 10 mg by mouth every 6 (six) hours as needed.    metoprolol succinate (TOPROL-XL) 25 MG 24 hr tablet Take 1 tablet (25 mg total) by mouth once daily.    nitroGLYCERIN (NITROSTAT) 0.4 MG SL tablet Place 1 tablet (0.4 mg total) under the tongue every 5 (five) minutes as needed for Chest pain.    ondansetron (ZOFRAN-ODT) 8 MG TbDL ondansetron 8 mg disintegrating tablet   DISSOLVE ONE TABLET ON TONGUE TWICE DAILY AS NEEDED    pantoprazole (PROTONIX) 40 MG tablet Take 2 tablets (80 mg total) by mouth once daily.    pravastatin (PRAVACHOL) 40 MG tablet Take 1 tablet (40 mg total) by mouth every evening.    promethazine (PHENERGAN) 25 MG tablet Take 25 mg by mouth every 4 (four) hours. One tablet every 8 hours prn for nausea or headache, no more than 2 in a day or 3 days a week, no driving when taking this medication.    traMADoL (ULTRAM) 50 mg tablet Take 1 tablet (50 mg total) by mouth every 12 (twelve) hours as needed for Pain (right ankle or hip pain).    UBROGEPANT 100 mg tablet TAKE ONE TABLET BY MOUTH AT ONSET of migraine, MAY REPEAT DOSE AFTER TWO hours. no more THAN TWO doses in APPLY 24 hour period     Family History       Problem Relation (Age of Onset)    Arthritis Daughter    Asthma Daughter, Son     Cardiomyopathy Father    Diabetes Mother, Father    Heart disease Mother, Maternal Grandmother    Hypertension Mother, Father, Paternal Grandmother    No Known Problems Brother    Thyroid disease Mother          Tobacco Use    Smoking status: Never    Smokeless tobacco: Never   Substance and Sexual Activity    Alcohol use: Yes    Drug use: Never    Sexual activity: Not Currently     Review of Systems   Constitutional:  Negative for activity change, chills, fatigue and fever.   HENT:  Negative for congestion and sore throat.    Respiratory:  Negative for chest tightness.    Gastrointestinal:  Positive for nausea. Negative for abdominal distention, abdominal pain and diarrhea.   Endocrine: Negative for cold intolerance and heat intolerance.   Genitourinary:  Negative for dysuria.   Musculoskeletal:  Negative for arthralgias and back pain.   Skin:  Negative for color change and rash.   Neurological:  Positive for weakness. Negative for dizziness, tremors and headaches.   Psychiatric/Behavioral:  Negative for agitation. The patient is not nervous/anxious.    Objective:     Vital Signs (Most Recent):  Temp: 98.2 °F (36.8 °C) (11/13/22 1819)  Pulse: 70 (11/13/22 1819)  Resp: 18 (11/13/22 1819)  BP: (!) 167/55 (11/13/22 1819)  SpO2: 99 % (11/13/22 1829)   Vital Signs (24h Range):  Temp:  [93.5 °F (34.2 °C)-98.2 °F (36.8 °C)] 98.2 °F (36.8 °C)  Pulse:  [70-87] 70  Resp:  [18-20] 18  SpO2:  [99 %-100 %] 99 %  BP: (167-188)/(55-84) 167/55     Weight: 93.8 kg (206 lb 12.7 oz)  Body mass index is 33.38 kg/m².    Physical Exam  Constitutional:       Appearance: She is obese.   HENT:      Head: Normocephalic and atraumatic.      Nose: Nose normal.      Mouth/Throat:      Mouth: Mucous membranes are moist.      Pharynx: Oropharynx is clear.   Eyes:      Extraocular Movements: Extraocular movements intact.      Conjunctiva/sclera: Conjunctivae normal.      Pupils: Pupils are equal, round, and reactive to light.    Cardiovascular:      Rate and Rhythm: Normal rate and regular rhythm.      Pulses: Normal pulses.      Heart sounds: Normal heart sounds.   Pulmonary:      Effort: Pulmonary effort is normal.      Breath sounds: Normal breath sounds.   Abdominal:      General: Abdomen is flat. Bowel sounds are normal.      Palpations: Abdomen is soft.   Musculoskeletal:         General: Normal range of motion.      Cervical back: Normal range of motion and neck supple.   Skin:     General: Skin is warm and dry.   Neurological:      General: No focal deficit present.      Mental Status: She is alert and oriented to person, place, and time.      Motor: Weakness present.   Psychiatric:         Mood and Affect: Mood normal.         Behavior: Behavior normal.         CRANIAL NERVES     CN III, IV, VI   Pupils are equal, round, and reactive to light.     Significant Labs: All pertinent labs within the past 24 hours have been reviewed.    Significant Imaging: I have reviewed all pertinent imaging results/findings within the past 24 hours.    Assessment/Plan:     * Hypoglycemia  Recurrent episodes of life-threatening hypoglycemia.   Stop long-acting insulin for now.   Allow blood glucose to run high.   Referral to endocrinology for proven strategies to better manage her blood pressure.   For now, stop all long acting insulin.  Sliding scale only.        Abnormal stress test  Patient with chest pain that has features of GERD.   Abnormal stress test and plan for LHC on 11/15 as outpatient.    Cardiology consulted to consider LHC as in-patient.      Prior echo:     Results for orders placed during the hospital encounter of 08/22/22    Echo    Interpretation Summary  · Concentric remodeling and normal systolic function.  · The estimated ejection fraction is 70%.  · Normal left ventricular diastolic function.  · Mild pulmonic regurgitation.  · Normal right ventricular size with normal right ventricular systolic  function.        Gastroesophageal reflux disease without esophagitis  Chest pain may be related to GERD based on location and description. Consider GI referral on discharge.       Hypothermia  Infection work-up initiated.   Troponin and BNP ordered. Recent echo so did not reorder.           Stage 2 chronic kidney disease due to type 2 diabetes mellitus  Will renally dose meds       Obesity  Body mass index is 33.38 kg/m². Morbid obesity complicates all aspects of disease management from diagnostic modalities to treatment. Weight loss encouraged and health benefits explained to patient.         Diabetes mellitus type I  Patient's FSGs are uncontrolled due to hypoglycemia on current medication regimen.  Last A1c reviewed-   Lab Results   Component Value Date    HGBA1C 7.5 (A) 11/02/2022     Most recent fingerstick glucose reviewed- No results for input(s): POCTGLUCOSE in the last 24 hours.  Current correctional scale  Low  Decrease anti-hyperglycemic dose as follows-   Antihyperglycemics (From admission, onward)    None        Hold Oral hypoglycemics while patient is in the hospital.    Hypertensive disorder  Blood pressure uncontrolled.    Added home blood pressure medications.        Migraine headache without aura  Resume home meds based on clinical course.         VTE Risk Mitigation (From admission, onward)         Ordered     enoxaparin injection 40 mg  Daily         11/13/22 1714     IP VTE HIGH RISK PATIENT  Once         11/13/22 1714     Place sequential compression device  Until discontinued         11/13/22 1714                   Maximus Marcos MD  Department of Hospital Medicine   [facility]

## 2022-11-14 NOTE — SUBJECTIVE & OBJECTIVE
Interval History:     Review of Systems   Constitutional:  Negative for activity change, chills, fatigue and fever.   HENT:  Negative for congestion and sore throat.    Respiratory:  Negative for chest tightness.    Gastrointestinal:  Positive for nausea. Negative for abdominal distention, abdominal pain and diarrhea.   Endocrine: Negative for cold intolerance and heat intolerance.   Genitourinary:  Negative for dysuria.   Musculoskeletal:  Negative for arthralgias and back pain.   Skin:  Negative for color change and rash.   Neurological:  Positive for weakness. Negative for dizziness, tremors and headaches.   Psychiatric/Behavioral:  Negative for agitation. The patient is not nervous/anxious.    Objective:     Vital Signs (Most Recent):  Temp: 97.9 °F (36.6 °C) (11/14/22 1149)  Pulse: 61 (11/14/22 1149)  Resp: 17 (11/14/22 1149)  BP: (!) 164/65 (11/14/22 1149)  SpO2: 99 % (11/14/22 1149)   Vital Signs (24h Range):  Temp:  [93.5 °F (34.2 °C)-98.2 °F (36.8 °C)] 97.9 °F (36.6 °C)  Pulse:  [61-87] 61  Resp:  [17-21] 17  SpO2:  [96 %-100 %] 99 %  BP: (117-188)/(53-84) 164/65     Weight: 93.8 kg (206 lb 12.7 oz)  Body mass index is 33.38 kg/m².    Intake/Output Summary (Last 24 hours) at 11/14/2022 1308  Last data filed at 11/13/2022 1604  Gross per 24 hour   Intake 1500 ml   Output --   Net 1500 ml      Physical Exam  Constitutional:       Appearance: She is obese.   HENT:      Head: Normocephalic and atraumatic.      Nose: Nose normal.      Mouth/Throat:      Mouth: Mucous membranes are moist.      Pharynx: Oropharynx is clear.   Eyes:      Extraocular Movements: Extraocular movements intact.      Conjunctiva/sclera: Conjunctivae normal.      Pupils: Pupils are equal, round, and reactive to light.   Cardiovascular:      Rate and Rhythm: Normal rate and regular rhythm.      Pulses: Normal pulses.      Heart sounds: Normal heart sounds.   Pulmonary:      Effort: Pulmonary effort is normal.      Breath sounds: Normal  breath sounds.   Abdominal:      General: Abdomen is flat. Bowel sounds are normal.      Palpations: Abdomen is soft.   Musculoskeletal:         General: Normal range of motion.      Cervical back: Normal range of motion and neck supple.   Skin:     General: Skin is warm and dry.   Neurological:      General: No focal deficit present.      Mental Status: She is alert and oriented to person, place, and time.      Motor: Weakness present.   Psychiatric:         Mood and Affect: Mood normal.         Behavior: Behavior normal.       Significant Labs: All pertinent labs within the past 24 hours have been reviewed.    Significant Imaging: I have reviewed all pertinent imaging results/findings within the past 24 hours.

## 2022-11-14 NOTE — PROGRESS NOTES
[facility]  [facility]  Progress Note    Patient Name: Talia Lawrence  MRN: [id number]  Patient Class: OP- Observation   Admission Date: 11/13/2022  Length of Stay: 0 days  Attending Physician: Maximus Marcos MD  Primary Care Provider: Frank Cazares NP        Subjective:     Principal Problem:Hypoglycemia    HPI:  53yo woman history of type 1 diabetes, obesity, HTN, HLD, GERD, CKD stage 2, glaucoma, migraines who presents with multiple episodes of hypoglycemia.  She states she passed out.  EMS was called multiple time.  She was also found to have hypothermia in the ED.   She is followed by Cherrie Rowland for diabetes.    Diabetes medications: Novolog sliding scale, basaglor (55 units bid and recently decreased to 54 units bid).  She states she has not started Farxiga nor has she started Toujeo.      Of note, she states she was having a squeezing pain in her left, lower chest.  She subsequently had a stress test and it was positive.  A [facility] was scheduled for 11/15.  Case discussed with cardiology and patient can have the stress test while in-patient since she has hypothermia and intermittent symptoms.        Overview/Hospital Course:  11/14:  No further hypoglycemic episodes.  Patient will need follow-up with Endocrinology.    Cardiology consulted for further evaluation given that patient was scheduled for left heart catheterization for abnormal stress test.      Interval History:     Review of Systems   Constitutional:  Negative for activity change, chills, fatigue and fever.   HENT:  Negative for congestion and sore throat.    Respiratory:  Negative for chest tightness.    Gastrointestinal:  Positive for nausea. Negative for abdominal distention, abdominal pain and diarrhea.   Endocrine: Negative for cold intolerance and heat intolerance.   Genitourinary:  Negative for dysuria.   Musculoskeletal:  Negative for arthralgias and back pain.   Skin:  Negative for color change and  rash.   Neurological:  Positive for weakness. Negative for dizziness, tremors and headaches.   Psychiatric/Behavioral:  Negative for agitation. The patient is not nervous/anxious.    Objective:     Vital Signs (Most Recent):  Temp: 97.9 °F (36.6 °C) (11/14/22 1149)  Pulse: 61 (11/14/22 1149)  Resp: 17 (11/14/22 1149)  BP: (!) 164/65 (11/14/22 1149)  SpO2: 99 % (11/14/22 1149)   Vital Signs (24h Range):  Temp:  [93.5 °F (34.2 °C)-98.2 °F (36.8 °C)] 97.9 °F (36.6 °C)  Pulse:  [61-87] 61  Resp:  [17-21] 17  SpO2:  [96 %-100 %] 99 %  BP: (117-188)/(53-84) 164/65     Weight: 93.8 kg (206 lb 12.7 oz)  Body mass index is 33.38 kg/m².    Intake/Output Summary (Last 24 hours) at 11/14/2022 1308  Last data filed at 11/13/2022 1604  Gross per 24 hour   Intake 1500 ml   Output --   Net 1500 ml      Physical Exam  Constitutional:       Appearance: She is obese.   HENT:      Head: Normocephalic and atraumatic.      Nose: Nose normal.      Mouth/Throat:      Mouth: Mucous membranes are moist.      Pharynx: Oropharynx is clear.   Eyes:      Extraocular Movements: Extraocular movements intact.      Conjunctiva/sclera: Conjunctivae normal.      Pupils: Pupils are equal, round, and reactive to light.   Cardiovascular:      Rate and Rhythm: Normal rate and regular rhythm.      Pulses: Normal pulses.      Heart sounds: Normal heart sounds.   Pulmonary:      Effort: Pulmonary effort is normal.      Breath sounds: Normal breath sounds.   Abdominal:      General: Abdomen is flat. Bowel sounds are normal.      Palpations: Abdomen is soft.   Musculoskeletal:         General: Normal range of motion.      Cervical back: Normal range of motion and neck supple.   Skin:     General: Skin is warm and dry.   Neurological:      General: No focal deficit present.      Mental Status: She is alert and oriented to person, place, and time.      Motor: Weakness present.   Psychiatric:         Mood and Affect: Mood normal.         Behavior: Behavior  normal.       Significant Labs: All pertinent labs within the past 24 hours have been reviewed.    Significant Imaging: I have reviewed all pertinent imaging results/findings within the past 24 hours.      Assessment/Plan:      * Hypoglycemia  Recurrent episodes of life-threatening hypoglycemia.   Stop long-acting insulin for now.   Allow blood glucose to run high.   Referral to endocrinology for proven strategies to better manage her blood pressure.   For now, stop all long acting insulin.  Sliding scale only.        Abnormal stress test  Patient with chest pain that has features of GERD.   Abnormal stress test and plan for LHC on 11/15 as outpatient.    Cardiology consulted to consider LHC as in-patient.      Prior echo:     Results for orders placed during the hospital encounter of 08/22/22    Echo    Interpretation Summary  · Concentric remodeling and normal systolic function.  · The estimated ejection fraction is 70%.  · Normal left ventricular diastolic function.  · Mild pulmonic regurgitation.  · Normal right ventricular size with normal right ventricular systolic function.        Gastroesophageal reflux disease without esophagitis  Chest pain may be related to GERD based on location and description. Consider GI referral on discharge.       Hypothermia  Infection work-up initiated.   Troponin and BNP ordered. Recent echo so did not reorder.           Stage 2 chronic kidney disease due to type 2 diabetes mellitus  Will renally dose meds       Obesity  Body mass index is 33.38 kg/m². Morbid obesity complicates all aspects of disease management from diagnostic modalities to treatment. Weight loss encouraged and health benefits explained to patient.         Diabetes mellitus type I  Patient's FSGs are uncontrolled due to hypoglycemia on current medication regimen.  Last A1c reviewed-   Lab Results   Component Value Date    HGBA1C 7.5 (A) 11/02/2022     Most recent fingerstick glucose reviewed- No results for  input(s): POCTGLUCOSE in the last 24 hours.  Current correctional scale  Low  Decrease anti-hyperglycemic dose as follows-   Antihyperglycemics (From admission, onward)    None        Hold Oral hypoglycemics while patient is in the hospital.    Hypertensive disorder  Blood pressure uncontrolled.    Added home blood pressure medications.        Migraine headache without aura  Resume home meds based on clinical course.         VTE Risk Mitigation (From admission, onward)         Ordered     enoxaparin injection 40 mg  Daily         11/13/22 1714     IP VTE HIGH RISK PATIENT  Once         11/13/22 1714     Place sequential compression device  Until discontinued         11/13/22 1714                Discharge Planning   CECILIA:      Code Status: Full Code   Is the patient medically ready for discharge?:     Reason for patient still in hospital (select all that apply): Treatment                     Maximus Marcos MD  Department of Hospital Medicine   [facility]

## 2022-11-15 PROBLEM — I25.119 CHEST PAIN DUE TO CAD: Status: ACTIVE | Noted: 2022-11-13

## 2022-11-15 LAB
ANION GAP SERPL CALCULATED.3IONS-SCNC: 15 MMOL/L (ref 7–16)
BASOPHILS # BLD AUTO: 0.05 K/UL (ref 0–0.2)
BASOPHILS NFR BLD AUTO: 0.6 % (ref 0–1)
BUN SERPL-MCNC: 21 MG/DL (ref 7–18)
BUN/CREAT SERPL: 16 (ref 6–20)
CALCIUM SERPL-MCNC: 8.6 MG/DL (ref 8.5–10.1)
CATH EF QUANTITATIVE: 60 %
CHLORIDE SERPL-SCNC: 100 MMOL/L (ref 98–107)
CO2 SERPL-SCNC: 25 MMOL/L (ref 21–32)
CREAT SERPL-MCNC: 1.34 MG/DL (ref 0.55–1.02)
DIFFERENTIAL METHOD BLD: ABNORMAL
EGFR (NO RACE VARIABLE) (RUSH/TITUS): 48 ML/MIN/1.73M²
EOSINOPHIL # BLD AUTO: 0.28 K/UL (ref 0–0.5)
EOSINOPHIL NFR BLD AUTO: 3.4 % (ref 1–4)
ERYTHROCYTE [DISTWIDTH] IN BLOOD BY AUTOMATED COUNT: 13.9 % (ref 11.5–14.5)
GLUCOSE SERPL-MCNC: 272 MG/DL (ref 70–105)
GLUCOSE SERPL-MCNC: 344 MG/DL (ref 74–106)
GLUCOSE SERPL-MCNC: 363 MG/DL (ref 70–105)
GLUCOSE SERPL-MCNC: 371 MG/DL (ref 70–105)
HCT VFR BLD AUTO: 39.1 % (ref 38–47)
HGB BLD-MCNC: 12.1 G/DL (ref 12–16)
IMM GRANULOCYTES # BLD AUTO: 0.02 K/UL (ref 0–0.04)
IMM GRANULOCYTES NFR BLD: 0.2 % (ref 0–0.4)
LYMPHOCYTES # BLD AUTO: 2.88 K/UL (ref 1–4.8)
LYMPHOCYTES NFR BLD AUTO: 35.3 % (ref 27–41)
MAGNESIUM SERPL-MCNC: 2 MG/DL (ref 1.7–2.3)
MCH RBC QN AUTO: 23.5 PG (ref 27–31)
MCHC RBC AUTO-ENTMCNC: 30.9 G/DL (ref 32–36)
MCV RBC AUTO: 76.1 FL (ref 80–96)
MONOCYTES # BLD AUTO: 0.59 K/UL (ref 0–0.8)
MONOCYTES NFR BLD AUTO: 7.2 % (ref 2–6)
MPC BLD CALC-MCNC: 12 FL (ref 9.4–12.4)
NEUTROPHILS # BLD AUTO: 4.34 K/UL (ref 1.8–7.7)
NEUTROPHILS NFR BLD AUTO: 53.3 % (ref 53–65)
NRBC # BLD AUTO: 0 X10E3/UL
NRBC, AUTO (.00): 0 %
PHOSPHATE SERPL-MCNC: 3.7 MG/DL (ref 2.5–4.5)
PLATELET # BLD AUTO: 227 K/UL (ref 150–400)
POTASSIUM SERPL-SCNC: 4.8 MMOL/L (ref 3.5–5.1)
RBC # BLD AUTO: 5.14 M/UL (ref 4.2–5.4)
SODIUM SERPL-SCNC: 135 MMOL/L (ref 136–145)
WBC # BLD AUTO: 8.16 K/UL (ref 4.5–11)

## 2022-11-15 PROCEDURE — 25000003 PHARM REV CODE 250: Performed by: HOSPITALIST

## 2022-11-15 PROCEDURE — 25000003 PHARM REV CODE 250: Performed by: NURSE PRACTITIONER

## 2022-11-15 PROCEDURE — 25000003 PHARM REV CODE 250: Performed by: INTERNAL MEDICINE

## 2022-11-15 PROCEDURE — 84100 ASSAY OF PHOSPHORUS: CPT | Performed by: HOSPITALIST

## 2022-11-15 PROCEDURE — 63600175 PHARM REV CODE 636 W HCPCS: Performed by: INTERNAL MEDICINE

## 2022-11-15 PROCEDURE — 83735 ASSAY OF MAGNESIUM: CPT | Performed by: HOSPITALIST

## 2022-11-15 PROCEDURE — 93458 PR CATH PLACE/CORON ANGIO, IMG SUPER/INTERP,W LEFT HEART VENTRICULOGRAPHY: ICD-10-PCS | Mod: 26,,, | Performed by: INTERNAL MEDICINE

## 2022-11-15 PROCEDURE — C1894 INTRO/SHEATH, NON-LASER: HCPCS | Performed by: INTERNAL MEDICINE

## 2022-11-15 PROCEDURE — 93458 L HRT ARTERY/VENTRICLE ANGIO: CPT | Performed by: INTERNAL MEDICINE

## 2022-11-15 PROCEDURE — 82962 GLUCOSE BLOOD TEST: CPT

## 2022-11-15 PROCEDURE — 93010 EKG 12-LEAD: ICD-10-PCS | Mod: ,,, | Performed by: HOSPITALIST

## 2022-11-15 PROCEDURE — 80048 BASIC METABOLIC PNL TOTAL CA: CPT | Performed by: HOSPITALIST

## 2022-11-15 PROCEDURE — 99153 MOD SED SAME PHYS/QHP EA: CPT | Performed by: INTERNAL MEDICINE

## 2022-11-15 PROCEDURE — 96361 HYDRATE IV INFUSION ADD-ON: CPT

## 2022-11-15 PROCEDURE — 25500020 PHARM REV CODE 255: Performed by: INTERNAL MEDICINE

## 2022-11-15 PROCEDURE — 93010 ELECTROCARDIOGRAM REPORT: CPT | Mod: ,,, | Performed by: HOSPITALIST

## 2022-11-15 PROCEDURE — 96374 THER/PROPH/DIAG INJ IV PUSH: CPT

## 2022-11-15 PROCEDURE — 36415 COLL VENOUS BLD VENIPUNCTURE: CPT | Performed by: HOSPITALIST

## 2022-11-15 PROCEDURE — 93458 L HRT ARTERY/VENTRICLE ANGIO: CPT | Mod: 26,,, | Performed by: INTERNAL MEDICINE

## 2022-11-15 PROCEDURE — C9113 INJ PANTOPRAZOLE SODIUM, VIA: HCPCS | Performed by: HOSPITALIST

## 2022-11-15 PROCEDURE — 99226 PR SUBSEQUENT OBSERVATION CARE,LEVEL III: ICD-10-PCS | Mod: 25,,, | Performed by: HOSPITALIST

## 2022-11-15 PROCEDURE — 63600175 PHARM REV CODE 636 W HCPCS: Performed by: HOSPITALIST

## 2022-11-15 PROCEDURE — 85025 COMPLETE CBC W/AUTO DIFF WBC: CPT | Performed by: HOSPITALIST

## 2022-11-15 PROCEDURE — 27201423 OPTIME MED/SURG SUP & DEVICES STERILE SUPPLY: Performed by: INTERNAL MEDICINE

## 2022-11-15 PROCEDURE — G0378 HOSPITAL OBSERVATION PER HR: HCPCS

## 2022-11-15 PROCEDURE — 93005 ELECTROCARDIOGRAM TRACING: CPT

## 2022-11-15 PROCEDURE — 96372 THER/PROPH/DIAG INJ SC/IM: CPT | Mod: 59

## 2022-11-15 PROCEDURE — 99226 PR SUBSEQUENT OBSERVATION CARE,LEVEL III: CPT | Mod: 25,,, | Performed by: HOSPITALIST

## 2022-11-15 PROCEDURE — 99152 MOD SED SAME PHYS/QHP 5/>YRS: CPT | Performed by: INTERNAL MEDICINE

## 2022-11-15 PROCEDURE — C1760 CLOSURE DEV, VASC: HCPCS | Performed by: INTERNAL MEDICINE

## 2022-11-15 RX ORDER — SODIUM CHLORIDE 450 MG/100ML
INJECTION, SOLUTION INTRAVENOUS CONTINUOUS
Status: DISCONTINUED | OUTPATIENT
Start: 2022-11-15 | End: 2022-11-15

## 2022-11-15 RX ORDER — MAG HYDROX/ALUMINUM HYD/SIMETH 200-200-20
30 SUSPENSION, ORAL (FINAL DOSE FORM) ORAL ONCE
Status: COMPLETED | OUTPATIENT
Start: 2022-11-15 | End: 2022-11-15

## 2022-11-15 RX ORDER — MIDAZOLAM HYDROCHLORIDE 5 MG/ML
INJECTION INTRAMUSCULAR; INTRAVENOUS
Status: DISCONTINUED | OUTPATIENT
Start: 2022-11-15 | End: 2022-11-15 | Stop reason: HOSPADM

## 2022-11-15 RX ORDER — HEPARIN SOD,PORCINE/0.9 % NACL 1000/500ML
INTRAVENOUS SOLUTION INTRAVENOUS
Status: DISCONTINUED | OUTPATIENT
Start: 2022-11-15 | End: 2022-11-15 | Stop reason: HOSPADM

## 2022-11-15 RX ORDER — LOSARTAN POTASSIUM 25 MG/1
25 TABLET ORAL DAILY
Status: DISCONTINUED | OUTPATIENT
Start: 2022-11-15 | End: 2022-11-16

## 2022-11-15 RX ORDER — FENTANYL CITRATE 50 UG/ML
INJECTION, SOLUTION INTRAMUSCULAR; INTRAVENOUS
Status: DISCONTINUED | OUTPATIENT
Start: 2022-11-15 | End: 2022-11-15 | Stop reason: HOSPADM

## 2022-11-15 RX ORDER — LIDOCAINE HYDROCHLORIDE 10 MG/ML
INJECTION INFILTRATION; PERINEURAL
Status: DISCONTINUED | OUTPATIENT
Start: 2022-11-15 | End: 2022-11-15 | Stop reason: HOSPADM

## 2022-11-15 RX ORDER — PANTOPRAZOLE SODIUM 40 MG/10ML
40 INJECTION, POWDER, LYOPHILIZED, FOR SOLUTION INTRAVENOUS
Status: DISCONTINUED | OUTPATIENT
Start: 2022-11-15 | End: 2022-11-16

## 2022-11-15 RX ORDER — SODIUM CHLORIDE 450 MG/100ML
INJECTION, SOLUTION INTRAVENOUS
Status: DISCONTINUED | OUTPATIENT
Start: 2022-11-15 | End: 2022-11-16 | Stop reason: HOSPADM

## 2022-11-15 RX ORDER — LIDOCAINE HYDROCHLORIDE 20 MG/ML
15 SOLUTION OROPHARYNGEAL ONCE
Status: COMPLETED | OUTPATIENT
Start: 2022-11-15 | End: 2022-11-15

## 2022-11-15 RX ORDER — ATORVASTATIN CALCIUM 40 MG/1
40 TABLET, FILM COATED ORAL NIGHTLY
Status: DISCONTINUED | OUTPATIENT
Start: 2022-11-15 | End: 2022-11-16 | Stop reason: HOSPADM

## 2022-11-15 RX ORDER — ISOSORBIDE MONONITRATE 30 MG/1
30 TABLET, EXTENDED RELEASE ORAL DAILY
Status: DISCONTINUED | OUTPATIENT
Start: 2022-11-15 | End: 2022-11-16 | Stop reason: HOSPADM

## 2022-11-15 RX ORDER — ONDANSETRON 4 MG/1
8 TABLET, ORALLY DISINTEGRATING ORAL EVERY 8 HOURS PRN
Status: DISCONTINUED | OUTPATIENT
Start: 2022-11-15 | End: 2022-11-16 | Stop reason: HOSPADM

## 2022-11-15 RX ADMIN — TICAGRELOR 180 MG: 90 TABLET ORAL at 03:11

## 2022-11-15 RX ADMIN — ATORVASTATIN CALCIUM 40 MG: 40 TABLET, FILM COATED ORAL at 10:11

## 2022-11-15 RX ADMIN — ISOSORBIDE MONONITRATE 30 MG: 30 TABLET, EXTENDED RELEASE ORAL at 03:11

## 2022-11-15 RX ADMIN — METOPROLOL TARTRATE 25 MG: 25 TABLET, FILM COATED ORAL at 10:11

## 2022-11-15 RX ADMIN — INSULIN DETEMIR 10 UNITS: 100 INJECTION, SOLUTION SUBCUTANEOUS at 09:11

## 2022-11-15 RX ADMIN — ACETAMINOPHEN 650 MG: 325 TABLET ORAL at 05:11

## 2022-11-15 RX ADMIN — AMLODIPINE BESYLATE 5 MG: 5 TABLET ORAL at 09:11

## 2022-11-15 RX ADMIN — SODIUM CHLORIDE 1000 ML: 9 INJECTION, SOLUTION INTRAVENOUS at 03:11

## 2022-11-15 RX ADMIN — INSULIN ASPART 3 UNITS: 100 INJECTION, SOLUTION INTRAVENOUS; SUBCUTANEOUS at 05:11

## 2022-11-15 RX ADMIN — HYDRALAZINE HYDROCHLORIDE 25 MG: 25 TABLET ORAL at 03:11

## 2022-11-15 RX ADMIN — LIDOCAINE HYDROCHLORIDE 15 ML: 20 SOLUTION ORAL at 03:11

## 2022-11-15 RX ADMIN — PANTOPRAZOLE SODIUM 40 MG: 40 INJECTION, POWDER, LYOPHILIZED, FOR SOLUTION INTRAVENOUS at 03:11

## 2022-11-15 RX ADMIN — ENOXAPARIN SODIUM 40 MG: 100 INJECTION SUBCUTANEOUS at 05:11

## 2022-11-15 RX ADMIN — METOPROLOL TARTRATE 25 MG: 25 TABLET, FILM COATED ORAL at 09:11

## 2022-11-15 RX ADMIN — LOSARTAN POTASSIUM 25 MG: 25 TABLET, FILM COATED ORAL at 09:11

## 2022-11-15 RX ADMIN — INSULIN ASPART 5 UNITS: 100 INJECTION, SOLUTION INTRAVENOUS; SUBCUTANEOUS at 06:11

## 2022-11-15 RX ADMIN — ALUMINUM HYDROXIDE, MAGNESIUM HYDROXIDE, AND SIMETHICONE 30 ML: 200; 200; 20 SUSPENSION ORAL at 03:11

## 2022-11-15 RX ADMIN — INSULIN ASPART 2 UNITS: 100 INJECTION, SOLUTION INTRAVENOUS; SUBCUTANEOUS at 10:11

## 2022-11-15 NOTE — NURSING
Notiifed DR. Cook of pt c/o not feeling well and a headache. Pt states her sugar is too high and she needs insulin. Blood glucose 371 at this time. Given 4 units at 1700 per MD orders.     Will give 5 units per order.

## 2022-11-15 NOTE — DISCHARGE INSTRUCTIONS
When do I need to call the doctor?   You have a seizure.  You are still confused 15 minutes after you have had a quick source of sugar.  You have passed out.  You feel very light-headed or like you are going to pass out.  You feel weak like you are going to fall.  You have blurry vision or trouble seeing.  You are having signs of low blood sugar more often.      Monitor your blood pressure and heart rate daily, maintain a log and take readings to your hospital follow up visit.  Report out of range readings to your primary care provider promptly.      table 1: Definition of normal and high blood pressure  Level  Top number  Bottom number    High 130 or above 80 or above   Elevated 120 to 129 79 or below   Normal 119 or below 79 or below     A normal heart rate ranges from .

## 2022-11-16 VITALS
HEIGHT: 66 IN | HEART RATE: 68 BPM | BODY MASS INDEX: 33.24 KG/M2 | TEMPERATURE: 98 F | DIASTOLIC BLOOD PRESSURE: 58 MMHG | WEIGHT: 206.81 LBS | SYSTOLIC BLOOD PRESSURE: 120 MMHG | RESPIRATION RATE: 18 BRPM | OXYGEN SATURATION: 99 %

## 2022-11-16 PROBLEM — I25.10 CAD (CORONARY ARTERY DISEASE): Status: RESOLVED | Noted: 2022-11-16 | Resolved: 2022-11-16

## 2022-11-16 PROBLEM — I25.10 CAD (CORONARY ARTERY DISEASE): Status: ACTIVE | Noted: 2022-11-16

## 2022-11-16 PROBLEM — I25.119 CHEST PAIN DUE TO CAD: Status: RESOLVED | Noted: 2022-11-13 | Resolved: 2022-11-16

## 2022-11-16 LAB
ANION GAP SERPL CALCULATED.3IONS-SCNC: 14 MMOL/L (ref 7–16)
BASOPHILS # BLD AUTO: 0.05 K/UL (ref 0–0.2)
BASOPHILS NFR BLD AUTO: 0.5 % (ref 0–1)
BUN SERPL-MCNC: 26 MG/DL (ref 7–18)
BUN/CREAT SERPL: 17 (ref 6–20)
CALCIUM SERPL-MCNC: 8.5 MG/DL (ref 8.5–10.1)
CHLORIDE SERPL-SCNC: 97 MMOL/L (ref 98–107)
CO2 SERPL-SCNC: 23 MMOL/L (ref 21–32)
CREAT SERPL-MCNC: 1.53 MG/DL (ref 0.55–1.02)
DIFFERENTIAL METHOD BLD: ABNORMAL
EGFR (NO RACE VARIABLE) (RUSH/TITUS): 41 ML/MIN/1.73M²
EOSINOPHIL # BLD AUTO: 0.22 K/UL (ref 0–0.5)
EOSINOPHIL NFR BLD AUTO: 2.2 % (ref 1–4)
ERYTHROCYTE [DISTWIDTH] IN BLOOD BY AUTOMATED COUNT: 13.9 % (ref 11.5–14.5)
GLUCOSE SERPL-MCNC: 298 MG/DL (ref 70–105)
GLUCOSE SERPL-MCNC: 364 MG/DL (ref 70–105)
GLUCOSE SERPL-MCNC: 376 MG/DL (ref 74–106)
GLUCOSE SERPL-MCNC: 408 MG/DL (ref 70–105)
GLUCOSE SERPL-MCNC: 448 MG/DL (ref 74–106)
GLUCOSE SERPL-MCNC: 527 MG/DL (ref 70–105)
HCT VFR BLD AUTO: 37.7 % (ref 38–47)
HGB BLD-MCNC: 11.6 G/DL (ref 12–16)
IMM GRANULOCYTES # BLD AUTO: 0.03 K/UL (ref 0–0.04)
IMM GRANULOCYTES NFR BLD: 0.3 % (ref 0–0.4)
LYMPHOCYTES # BLD AUTO: 2.24 K/UL (ref 1–4.8)
LYMPHOCYTES NFR BLD AUTO: 22.4 % (ref 27–41)
MAGNESIUM SERPL-MCNC: 2 MG/DL (ref 1.7–2.3)
MCH RBC QN AUTO: 24 PG (ref 27–31)
MCHC RBC AUTO-ENTMCNC: 30.8 G/DL (ref 32–36)
MCV RBC AUTO: 77.9 FL (ref 80–96)
MONOCYTES # BLD AUTO: 0.71 K/UL (ref 0–0.8)
MONOCYTES NFR BLD AUTO: 7.1 % (ref 2–6)
MPC BLD CALC-MCNC: 11.7 FL (ref 9.4–12.4)
NEUTROPHILS # BLD AUTO: 6.75 K/UL (ref 1.8–7.7)
NEUTROPHILS NFR BLD AUTO: 67.5 % (ref 53–65)
NRBC # BLD AUTO: 0 X10E3/UL
NRBC, AUTO (.00): 0 %
PHOSPHATE SERPL-MCNC: 3.7 MG/DL (ref 2.5–4.5)
PLATELET # BLD AUTO: 238 K/UL (ref 150–400)
POTASSIUM SERPL-SCNC: 4.6 MMOL/L (ref 3.5–5.1)
RBC # BLD AUTO: 4.84 M/UL (ref 4.2–5.4)
SODIUM SERPL-SCNC: 129 MMOL/L (ref 136–145)
WBC # BLD AUTO: 10 K/UL (ref 4.5–11)

## 2022-11-16 PROCEDURE — 25000003 PHARM REV CODE 250: Performed by: HOSPITALIST

## 2022-11-16 PROCEDURE — 84100 ASSAY OF PHOSPHORUS: CPT | Performed by: HOSPITALIST

## 2022-11-16 PROCEDURE — 99214 PR OFFICE/OUTPT VISIT, EST, LEVL IV, 30-39 MIN: ICD-10-PCS | Mod: ,,, | Performed by: INTERNAL MEDICINE

## 2022-11-16 PROCEDURE — 82962 GLUCOSE BLOOD TEST: CPT

## 2022-11-16 PROCEDURE — 25000003 PHARM REV CODE 250: Performed by: NURSE PRACTITIONER

## 2022-11-16 PROCEDURE — 99217 PR OBSERVATION CARE DISCHARGE: CPT | Mod: ,,, | Performed by: HOSPITALIST

## 2022-11-16 PROCEDURE — 63600175 PHARM REV CODE 636 W HCPCS: Performed by: HOSPITALIST

## 2022-11-16 PROCEDURE — G0378 HOSPITAL OBSERVATION PER HR: HCPCS

## 2022-11-16 PROCEDURE — 82947 ASSAY GLUCOSE BLOOD QUANT: CPT | Performed by: HOSPITALIST

## 2022-11-16 PROCEDURE — 96372 THER/PROPH/DIAG INJ SC/IM: CPT

## 2022-11-16 PROCEDURE — 99214 OFFICE O/P EST MOD 30 MIN: CPT | Mod: ,,, | Performed by: INTERNAL MEDICINE

## 2022-11-16 PROCEDURE — 83735 ASSAY OF MAGNESIUM: CPT | Performed by: HOSPITALIST

## 2022-11-16 PROCEDURE — 99217 PR OBSERVATION CARE DISCHARGE: ICD-10-PCS | Mod: ,,, | Performed by: HOSPITALIST

## 2022-11-16 PROCEDURE — 36415 COLL VENOUS BLD VENIPUNCTURE: CPT | Performed by: HOSPITALIST

## 2022-11-16 PROCEDURE — 80048 BASIC METABOLIC PNL TOTAL CA: CPT | Performed by: HOSPITALIST

## 2022-11-16 PROCEDURE — 85025 COMPLETE CBC W/AUTO DIFF WBC: CPT | Performed by: HOSPITALIST

## 2022-11-16 RX ORDER — GLUCAGON 1 MG
1 KIT INJECTION
Status: DISCONTINUED | OUTPATIENT
Start: 2022-11-16 | End: 2022-11-16 | Stop reason: HOSPADM

## 2022-11-16 RX ORDER — METOPROLOL SUCCINATE 25 MG/1
50 TABLET, EXTENDED RELEASE ORAL DAILY
Qty: 90 TABLET | Refills: 1 | Status: SHIPPED | OUTPATIENT
Start: 2022-11-16 | End: 2023-06-13 | Stop reason: SDUPTHER

## 2022-11-16 RX ORDER — LOSARTAN POTASSIUM 50 MG/1
50 TABLET ORAL DAILY
Status: DISCONTINUED | OUTPATIENT
Start: 2022-11-17 | End: 2022-11-16 | Stop reason: HOSPADM

## 2022-11-16 RX ORDER — DEXTROSE MONOHYDRATE 100 MG/ML
25 INJECTION, SOLUTION INTRAVENOUS
Status: DISCONTINUED | OUTPATIENT
Start: 2022-11-16 | End: 2022-11-16 | Stop reason: HOSPADM

## 2022-11-16 RX ORDER — DEXTROSE MONOHYDRATE 100 MG/ML
12.5 INJECTION, SOLUTION INTRAVENOUS
Status: DISCONTINUED | OUTPATIENT
Start: 2022-11-16 | End: 2022-11-16 | Stop reason: HOSPADM

## 2022-11-16 RX ORDER — PANTOPRAZOLE SODIUM 40 MG/1
40 TABLET, DELAYED RELEASE ORAL
Status: DISCONTINUED | OUTPATIENT
Start: 2022-11-16 | End: 2022-11-16 | Stop reason: HOSPADM

## 2022-11-16 RX ORDER — ACETYLCYSTEINE 600 MG
600 CAPSULE ORAL 3 TIMES DAILY
Status: DISCONTINUED | OUTPATIENT
Start: 2022-11-16 | End: 2022-11-16 | Stop reason: HOSPADM

## 2022-11-16 RX ORDER — ATORVASTATIN CALCIUM 40 MG/1
40 TABLET, FILM COATED ORAL NIGHTLY
Qty: 90 TABLET | Refills: 0 | Status: SHIPPED | OUTPATIENT
Start: 2022-11-16 | End: 2022-12-13 | Stop reason: SDUPTHER

## 2022-11-16 RX ORDER — ISOSORBIDE MONONITRATE 30 MG/1
60 TABLET, EXTENDED RELEASE ORAL DAILY
Qty: 60 TABLET | Refills: 0 | Status: SHIPPED | OUTPATIENT
Start: 2022-11-17 | End: 2022-12-13 | Stop reason: SDUPTHER

## 2022-11-16 RX ORDER — METOPROLOL SUCCINATE 50 MG/1
50 TABLET, EXTENDED RELEASE ORAL DAILY
Status: DISCONTINUED | OUTPATIENT
Start: 2022-11-17 | End: 2022-11-16 | Stop reason: HOSPADM

## 2022-11-16 RX ORDER — INSULIN ASPART 100 [IU]/ML
1-10 INJECTION, SOLUTION INTRAVENOUS; SUBCUTANEOUS
Status: DISCONTINUED | OUTPATIENT
Start: 2022-11-16 | End: 2022-11-16 | Stop reason: HOSPADM

## 2022-11-16 RX ADMIN — HYDRALAZINE HYDROCHLORIDE 25 MG: 25 TABLET ORAL at 06:11

## 2022-11-16 RX ADMIN — INSULIN DETEMIR 20 UNITS: 100 INJECTION, SOLUTION SUBCUTANEOUS at 08:11

## 2022-11-16 RX ADMIN — Medication 600 MG: at 04:11

## 2022-11-16 RX ADMIN — INSULIN ASPART 5 UNITS: 100 INJECTION, SOLUTION INTRAVENOUS; SUBCUTANEOUS at 10:11

## 2022-11-16 RX ADMIN — TICAGRELOR 90 MG: 90 TABLET ORAL at 09:11

## 2022-11-16 RX ADMIN — INSULIN ASPART 5 UNITS: 100 INJECTION, SOLUTION INTRAVENOUS; SUBCUTANEOUS at 06:11

## 2022-11-16 RX ADMIN — AMLODIPINE BESYLATE 5 MG: 5 TABLET ORAL at 08:11

## 2022-11-16 RX ADMIN — ACETAMINOPHEN 650 MG: 325 TABLET ORAL at 11:11

## 2022-11-16 RX ADMIN — ISOSORBIDE MONONITRATE 30 MG: 30 TABLET, EXTENDED RELEASE ORAL at 08:11

## 2022-11-16 RX ADMIN — INSULIN ASPART 10 UNITS: 100 INJECTION, SOLUTION INTRAVENOUS; SUBCUTANEOUS at 02:11

## 2022-11-16 RX ADMIN — INSULIN DETEMIR 10 UNITS: 100 INJECTION, SOLUTION SUBCUTANEOUS at 11:11

## 2022-11-16 RX ADMIN — INSULIN ASPART 10 UNITS: 100 INJECTION, SOLUTION INTRAVENOUS; SUBCUTANEOUS at 12:11

## 2022-11-16 RX ADMIN — INSULIN ASPART 10 UNITS: 100 INJECTION, SOLUTION INTRAVENOUS; SUBCUTANEOUS at 01:11

## 2022-11-16 RX ADMIN — METOPROLOL TARTRATE 25 MG: 25 TABLET, FILM COATED ORAL at 08:11

## 2022-11-16 RX ADMIN — Medication 600 MG: at 09:11

## 2022-11-16 NOTE — ASSESSMENT & PLAN NOTE
Patient with chest pain that has features of GERD.   Abnormal stress test and plan for LHC on 11/15 as outpatient.    Cardiology consulted to consider LHC as in-patient.      11/15: [facility].  Non-obstructive CAD.     Prior echo:     Results for orders placed during the hospital encounter of 08/22/22    Echo    Interpretation Summary  · Concentric remodeling and normal systolic function.  · The estimated ejection fraction is 70%.  · Normal left ventricular diastolic function.  · Mild pulmonic regurgitation.  · Normal right ventricular size with normal right ventricular systolic function.    LHC summary   Impression:  1. Two-vessel coronary artery disease with  of the mid RCA.    2. Normal left ventricular size with anterior and inferior wall hypokinesis  and overall normal left ventricular systolic function, ejection fraction 60%.    3. No significant mitral regurgitation.      Plan:  1. Will attempt medical management of coronary artery disease and reassess symptoms in clinic in 4-6 weeks.  2. Risk factor modification.  Antiplatelet and anti angina medications.

## 2022-11-16 NOTE — SUBJECTIVE & OBJECTIVE
Interval History:     Review of Systems   Constitutional:  Negative for activity change, chills, fatigue and fever.   HENT:  Negative for congestion and sore throat.    Respiratory:  Negative for chest tightness.    Gastrointestinal:  Positive for nausea. Negative for abdominal distention, abdominal pain and diarrhea.   Endocrine: Negative for cold intolerance and heat intolerance.   Genitourinary:  Negative for dysuria.   Musculoskeletal:  Negative for arthralgias and back pain.   Skin:  Negative for color change and rash.   Neurological:  Positive for weakness. Negative for dizziness, tremors and headaches.   Psychiatric/Behavioral:  Negative for agitation. The patient is not nervous/anxious.    Objective:     Vital Signs (Most Recent):  Temp: 97 °F (36.1 °C) (11/15/22 1631)  Pulse: 81 (11/15/22 1631)  Resp: 17 (11/15/22 1631)  BP: (!) 155/84 (11/15/22 1631)  SpO2: 99 % (11/15/22 1631)   Vital Signs (24h Range):  Temp:  [97 °F (36.1 °C)-98.6 °F (37 °C)] 97 °F (36.1 °C)  Pulse:  [58-81] 81  Resp:  [17-18] 17  SpO2:  [97 %-100 %] 99 %  BP: (155-182)/(65-88) 155/84     Weight: 93.8 kg (206 lb 12.7 oz)  Body mass index is 33.38 kg/m².  No intake or output data in the 24 hours ending 11/15/22 1858   Physical Exam  Constitutional:       Appearance: She is obese.   HENT:      Head: Normocephalic and atraumatic.      Nose: Nose normal.      Mouth/Throat:      Mouth: Mucous membranes are moist.      Pharynx: Oropharynx is clear.   Eyes:      Extraocular Movements: Extraocular movements intact.      Conjunctiva/sclera: Conjunctivae normal.      Pupils: Pupils are equal, round, and reactive to light.   Cardiovascular:      Rate and Rhythm: Normal rate and regular rhythm.      Pulses: Normal pulses.      Heart sounds: Normal heart sounds.   Pulmonary:      Effort: Pulmonary effort is normal.      Breath sounds: Normal breath sounds.   Abdominal:      General: Abdomen is flat. Bowel sounds are normal.      Palpations: Abdomen  is soft.   Musculoskeletal:         General: Normal range of motion.      Cervical back: Normal range of motion and neck supple.   Skin:     General: Skin is warm and dry.   Neurological:      General: No focal deficit present.      Mental Status: She is alert and oriented to person, place, and time.      Motor: Weakness present.   Psychiatric:         Mood and Affect: Mood normal.         Behavior: Behavior normal.       Significant Labs: All pertinent labs within the past 24 hours have been reviewed.    Significant Imaging: I have reviewed all pertinent imaging results/findings within the past 24 hours.

## 2022-11-16 NOTE — PROGRESS NOTES
[facility] Hospital Medicine  Progress Note    Patient Name: Talia Lawrence  MRN: [id number]  Patient Class: OP- Observation   Admission Date: 11/13/2022  Length of Stay: 0 days  Attending Physician: Maximus Marcos MD  Primary Care Provider: Frank Cazares NP        Subjective:     Principal Problem:Hypoglycemia    HPI:  51yo woman history of type 1 diabetes, obesity, HTN, HLD, GERD, CKD stage 2, glaucoma, migraines who presents with multiple episodes of hypoglycemia.  She states she passed out.  EMS was called multiple time.  She was also found to have hypothermia in the ED.   She is followed by Cherrie Rowland for diabetes.    Diabetes medications: Novolog sliding scale, basaglor (55 units bid and recently decreased to 54 units bid).  She states she has not started Farxiga nor has she started Toujeo.      Of note, she states she was having a squeezing pain in her left, lower chest.  She subsequently had a stress test and it was positive.  A LHC was scheduled for 11/15.  Case discussed with cardiology and patient can have the stress test while in-patient since she has hypothermia and intermittent symptoms.        Overview/Hospital Course:  11/14:  No further hypoglycemic episodes.  Patient will need follow-up with Endocrinology.    Cardiology consulted for further evaluation given that patient was scheduled for left heart catheterization for abnormal stress test.    11/15: s/p LHC. Non-obstructive CAD.       Interval History:     Review of Systems   Constitutional:  Negative for activity change, chills, fatigue and fever.   HENT:  Negative for congestion and sore throat.    Respiratory:  Negative for chest tightness.    Gastrointestinal:  Positive for nausea. Negative for abdominal distention, abdominal pain and diarrhea.   Endocrine: Negative for cold intolerance and heat intolerance.   Genitourinary:  Negative for dysuria.   Musculoskeletal:  Negative for arthralgias and back pain.    Skin:  Negative for color change and rash.   Neurological:  Positive for weakness. Negative for dizziness, tremors and headaches.   Psychiatric/Behavioral:  Negative for agitation. The patient is not nervous/anxious.    Objective:     Vital Signs (Most Recent):  Temp: 97 °F (36.1 °C) (11/15/22 1631)  Pulse: 81 (11/15/22 1631)  Resp: 17 (11/15/22 1631)  BP: (!) 155/84 (11/15/22 1631)  SpO2: 99 % (11/15/22 1631)   Vital Signs (24h Range):  Temp:  [97 °F (36.1 °C)-98.6 °F (37 °C)] 97 °F (36.1 °C)  Pulse:  [58-81] 81  Resp:  [17-18] 17  SpO2:  [97 %-100 %] 99 %  BP: (155-182)/(65-88) 155/84     Weight: 93.8 kg (206 lb 12.7 oz)  Body mass index is 33.38 kg/m².  No intake or output data in the 24 hours ending 11/15/22 1858   Physical Exam  Constitutional:       Appearance: She is obese.   HENT:      Head: Normocephalic and atraumatic.      Nose: Nose normal.      Mouth/Throat:      Mouth: Mucous membranes are moist.      Pharynx: Oropharynx is clear.   Eyes:      Extraocular Movements: Extraocular movements intact.      Conjunctiva/sclera: Conjunctivae normal.      Pupils: Pupils are equal, round, and reactive to light.   Cardiovascular:      Rate and Rhythm: Normal rate and regular rhythm.      Pulses: Normal pulses.      Heart sounds: Normal heart sounds.   Pulmonary:      Effort: Pulmonary effort is normal.      Breath sounds: Normal breath sounds.   Abdominal:      General: Abdomen is flat. Bowel sounds are normal.      Palpations: Abdomen is soft.   Musculoskeletal:         General: Normal range of motion.      Cervical back: Normal range of motion and neck supple.   Skin:     General: Skin is warm and dry.   Neurological:      General: No focal deficit present.      Mental Status: She is alert and oriented to person, place, and time.      Motor: Weakness present.   Psychiatric:         Mood and Affect: Mood normal.         Behavior: Behavior normal.       Significant Labs: All pertinent labs within the past 24  hours have been reviewed.    Significant Imaging: I have reviewed all pertinent imaging results/findings within the past 24 hours.      Assessment/Plan:      * Hypoglycemia  Recurrent episodes of life-threatening hypoglycemia.   Stop long-acting insulin for now.   Allow blood glucose to run high.   Referral to endocrinology for proven strategies to better manage her blood pressure.   For now, stop all long acting insulin.  Sliding scale only.        Abnormal stress test  Patient with chest pain that has features of GERD.   Abnormal stress test and plan for LHC on 11/15 as outpatient.    Cardiology consulted to consider LHC as in-patient.      11/15: LHC.  Non-obstructive CAD.     Prior echo:     Results for orders placed during the hospital encounter of 08/22/22    Echo    Interpretation Summary  · Concentric remodeling and normal systolic function.  · The estimated ejection fraction is 70%.  · Normal left ventricular diastolic function.  · Mild pulmonic regurgitation.  · Normal right ventricular size with normal right ventricular systolic function.        Gastroesophageal reflux disease without esophagitis  Chest pain may be related to GERD based on location and description. Consider GI referral on discharge.       Hypothermia  Infection work-up initiated.   Troponin and BNP ordered. Recent echo so did not reorder.           Stage 2 chronic kidney disease due to type 2 diabetes mellitus  Will renally dose meds       Obesity  Body mass index is 33.38 kg/m². Morbid obesity complicates all aspects of disease management from diagnostic modalities to treatment. Weight loss encouraged and health benefits explained to patient.         Diabetes mellitus type I  Patient's FSGs are uncontrolled due to hypoglycemia on current medication regimen.  Last A1c reviewed-   Lab Results   Component Value Date    HGBA1C 7.5 (A) 11/02/2022     Most recent fingerstick glucose reviewed- No results for input(s): POCTGLUCOSE in the last 24  hours.  Current correctional scale  Low  Decrease anti-hyperglycemic dose as follows-   Antihyperglycemics (From admission, onward)    Start     Stop Route Frequency Ordered    11/16/22 0900  insulin detemir U-100 injection 20 Units         -- SubQ Daily 11/15/22 1858    11/14/22 1408  insulin aspart U-100 injection 0-5 Units         -- SubQ Before meals & nightly PRN 11/14/22 1309        Hold Oral hypoglycemics while patient is in the hospital.    Blood glucose to 400 today.      20 units levemir and sliding scale is the plan for her.  Hold if blood glucose less than 150.      Hypertensive disorder  Blood pressure uncontrolled.    Added home blood pressure medications.        Migraine headache without aura  Resume home meds based on clinical course.         VTE Risk Mitigation (From admission, onward)         Ordered     enoxaparin injection 40 mg  Daily         11/13/22 1714     IP VTE HIGH RISK PATIENT  Once         11/13/22 1714     Place sequential compression device  Until discontinued         11/13/22 1714                Discharge Planning   CECILIA: 11/15/2022     Code Status: Full Code   Is the patient medically ready for discharge?:     Reason for patient still in hospital (select all that apply): Treatment                     Maximus Marcos MD  Department of Hospital Medicine   [facility]

## 2022-11-16 NOTE — ASSESSMENT & PLAN NOTE
Patient with chest pain that has features of GERD.   Abnormal stress test and plan for LHC on 11/15 as outpatient.    Cardiology consulted to consider LHC as in-patient.      11/15: LHC.  Non-obstructive CAD.     Prior echo:     Results for orders placed during the hospital encounter of 08/22/22    Echo    Interpretation Summary  · Concentric remodeling and normal systolic function.  · The estimated ejection fraction is 70%.  · Normal left ventricular diastolic function.  · Mild pulmonic regurgitation.  · Normal right ventricular size with normal right ventricular systolic function.

## 2022-11-16 NOTE — ASSESSMENT & PLAN NOTE
Patient's FSGs are uncontrolled due to hypoglycemia on current medication regimen.  Last A1c reviewed-   Lab Results   Component Value Date    HGBA1C 7.5 (A) 11/02/2022     Most recent fingerstick glucose reviewed- No results for input(s): POCTGLUCOSE in the last 24 hours.  Current correctional scale  Low  Decrease anti-hyperglycemic dose as follows-   Antihyperglycemics (From admission, onward)    Start     Stop Route Frequency Ordered    11/16/22 0900  insulin detemir U-100 injection 20 Units         -- SubQ Daily 11/15/22 1858    11/14/22 1408  insulin aspart U-100 injection 0-5 Units         -- SubQ Before meals & nightly PRN 11/14/22 1309        Hold Oral hypoglycemics while patient is in the hospital.    Blood glucose to 400 today.      20 units levemir and sliding scale is the plan for her.  Hold if blood glucose less than 150.

## 2022-11-16 NOTE — PROGRESS NOTES
Pharmacist Intervention IV to PO Note    Talia Lawrence is a 52 y.o. female being treated with IV medication pantoprazole    Patient Data:    Vital Signs (Most Recent):  Temp: 97.9 °F (36.6 °C) (11/16/22 1200)  Pulse: 63 (11/16/22 1200)  Resp: 18 (11/16/22 1200)  BP: (!) 119/55 (11/16/22 1200)  SpO2: 99 % (11/16/22 1200)   Vital Signs (72h Range):  Temp:  [96.4 °F (35.8 °C)-98.7 °F (37.1 °C)]   Pulse:  [58-81]   Resp:  [17-21]   BP: (117-182)/(53-88)   SpO2:  [95 %-100 %]      CBC:  Recent Labs   Lab 11/14/22  1126 11/15/22  0410 11/16/22  0404   WBC 10.84 8.16 10.00   RBC 5.36 5.14 4.84   HGB 12.9 12.1 11.6*   HCT 41.1 39.1 37.7*    227 238   MCV 76.7* 76.1* 77.9*   MCH 24.1* 23.5* 24.0*   MCHC 31.4* 30.9* 30.8*     CMP:     Recent Labs   Lab 11/13/22  1415 11/14/22  0741 11/15/22  0410 11/16/22  0404   GLU 61* 253* 344* 376*   CALCIUM 8.6 8.9 8.6 8.5   ALBUMIN 3.5  --   --   --    PROT 7.0  --   --   --     135* 135* 129*   K 4.5 5.1 4.8 4.6   CO2 26 24 25 23    103 100 97*   BUN 17 15 21* 26*   CREATININE 1.06* 1.22* 1.34* 1.53*   ALKPHOS 155*  --   --   --    ALT 16  --   --   --    AST 12*  --   --   --    BILITOT 0.5  --   --   --        Dietary Orders:  Diet Orders            Diet diabetic: Diabetic starting at 11/15 1131            Based on the following criteria, this patient qualifies for intravenous to oral conversion:  [x] The patients gastrointestinal tract is functioning (tolerating medications via oral or enteral route for 24 hours and tolerating food or enteral feeds for 24 hours).  [x] The patient is hemodynamically stable for 24 hours (heart rate <100 beats per minute, systolic blood pressure >99 mm Hg, and respiratory rate <20 breaths per minute).  [x] The patient shows clinical improvement (afebrile for at least 24 hours and white blood cell count downtrending or normalized). Additionally, the patient must be non-neutropenic (absolute neutrophil count >500  cells/mm3).  [x] For antimicrobials, the patient has received IV therapy for at least 24 hours.    IV medication PANTOPRAZOLE will be changed to oral medication pantoprazole 40mg BID    Pharmacist's Name: Abigail Egan  Pharmacist's Extension: [phone]

## 2022-11-16 NOTE — ASSESSMENT & PLAN NOTE
Blood pressure uncontrolled.    Added home blood pressure medications.      D/C home meds completed per Dr. Marcos. Cardiology recommendations followed as well.

## 2022-11-16 NOTE — PLAN OF CARE
[facility]  Initial Discharge Assessment       Primary Care Provider: Frank Cazares NP    Admission Diagnosis: Hypoglycemia [E16.2]  Hypertension [I10]  Chest pain [R07.9]  Hypothermia, initial encounter [T68.XXXA]    Admission Date: 11/13/2022  Expected Discharge Date: 11/15/2022    Discharge Barriers Identified: None    Payor:  / Plan:  / Product Type: Government /     Extended Emergency Contact Information  Primary Emergency Contact: LANDEN HOLDER  Address: [address] [place] [address]            [place] [address] [facility]  Home Phone: [phone]  Mobile Phone: [phone]  Relation: Father  Preferred language: English   needed? No    Discharge Plan A: Home  Discharge Plan B: Home      [facility] [id number]  Phone: [phone] Fax: [phone]    [facility] [address] [place] [address]  Phone: [phone] Fax: [phone]    [facility]  [facility] [place] [facility] [address]  [address] [facility] [place] [address]  Phone: [phone] Fax: [phone]     [facility] [address]  [address]  [place] [address]  Phone: [phone] Fax: [phone]    [facility] [address] [place] [address]  Phone: [phone] Fax: [phone]      Initial Assessment (most recent)       Adult Discharge Assessment - 11/15/22 1500          Discharge Assessment    Assessment Type Discharge Planning Assessment     Confirmed/corrected address, phone number and insurance Yes     Confirmed Demographics Correct on Facesheet     Source of Information patient     Communicated CECILIA with patient/caregiver Date not available/Unable to determine     Lives With alone     Do you expect to return to your current living situation? Yes     Do you have help at  home or someone to help you manage your care at home? No     Prior to hospitilization cognitive status: Unable to Assess     Current cognitive status: Alert/Oriented     Walking or Climbing Stairs Difficulty none     Dressing/Bathing Difficulty none     Home Layout Able to live on 1st floor     Equipment Currently Used at Home none     Readmission within 30 days? No     Patient currently being followed by outpatient case management? No     Do you currently have service(s) that help you manage your care at home? No     Do you take prescription medications? Yes     Do you have prescription coverage? Yes     Do you have any problems affording any of your prescribed medications? No     Is the patient taking medications as prescribed? yes     How do you get to doctors appointments? car, drives self     Are you on dialysis? No     Do you take coumadin? No     Discharge Plan A Home     Discharge Plan B Home     DME Needed Upon Discharge  none     Discharge Plan discussed with: Patient     Discharge Barriers Identified None        Physical Activity    On average, how many days per week do you engage in moderate to strenuous exercise (like a brisk walk)? 0 days     On average, how many minutes do you engage in exercise at this level? 0 min        Financial Resource Strain    How hard is it for you to pay for the very basics like food, housing, medical care, and heating? Not hard at all        Housing Stability    In the last 12 months, was there a time when you were not able to pay the mortgage or rent on time? No     In the last 12 months, how many places have you lived? 1     In the last 12 months, was there a time when you did not have a steady place to sleep or slept in a shelter (including now)? No        Transportation Needs    In the past 12 months, has lack of transportation kept you from medical appointments or from getting medications? No     In the past 12 months, has lack of transportation kept you from meetings,  work, or from getting things needed for daily living? No        Food Insecurity    Within the past 12 months, you worried that your food would run out before you got the money to buy more. Never true     Within the past 12 months, the food you bought just didn't last and you didn't have money to get more. Never true        Stress    Do you feel stress - tense, restless, nervous, or anxious, or unable to sleep at night because your mind is troubled all the time - these days? Not at all        Social Connections    In a typical week, how many times do you talk on the phone with family, friends, or neighbors? More than three times a week     How often do you get together with friends or relatives? More than three times a week     How often do you attend [employer] or Yars[employer] services? 1 to 4 times per year     Do you belong to any clubs or organizations such as Baptist groups, unions, fraternal or athletic groups, or school groups? Yes     How often do you attend meetings of the clubs or organizations you belong to? 1 to 4 times per year     Are you , , , , never , or living with a partner?         Alcohol Use    Q1: How often do you have a drink containing alcohol? Never     Q2: How many drinks containing alcohol do you have on a typical day when you are drinking? Patient does not drink     Q3: How often do you have six or more drinks on one occasion? Never                   Consulted for cardiac rehab. Spoke with pt information sheet given choice obtained for [facility] rehab faxed referral. 0 further dc needs at this time. Will follow as dcneed arise.

## 2022-11-16 NOTE — ASSESSMENT & PLAN NOTE
Patient's FSGs are uncontrolled due to hypoglycemia on current medication regimen.  Last A1c reviewed-   Lab Results   Component Value Date    HGBA1C 7.5 (A) 11/02/2022     Most recent fingerstick glucose reviewed- No results for input(s): POCTGLUCOSE in the last 24 hours.  Current correctional scale  Low  Decrease anti-hyperglycemic dose as follows-   Antihyperglycemics (From admission, onward)    Start     Stop Route Frequency Ordered    11/16/22 1333  insulin aspart U-100 injection 1-10 Units         -- SubQ Before meals & nightly PRN 11/16/22 1233    11/16/22 0900  insulin detemir U-100 injection 20 Units         -- SubQ Daily 11/15/22 1901        Hold Oral hypoglycemics while patient is in the hospital.    Blood glucose to 400 today.      20 units levemir and sliding scale is the plan for her.  Hold if blood glucose less than 150.      Dr. Marcos discussed at the bedside in detail patient's insulin regimen and goal parameters for blood glucose control. Referral made for patient to see Endocrinologist in [place] per patient's request.

## 2022-11-16 NOTE — ASSESSMENT & PLAN NOTE
Recurrent episodes of life-threatening hypoglycemia.   Stop long-acting insulin for now.   Allow blood glucose to run high.   Referral to endocrinology for proven strategies to better manage her blood pressure.   For now, stop all long acting insulin.  Sliding scale only.      50 units of long acting insulin and sliding scale.    Hold if Glucose is less than 150.     Follow-up with PCP and endocrinology, see med rec completed by Dr. Marcos for patient's insulin recommendations at d/c.

## 2022-11-16 NOTE — DISCHARGE SUMMARY
[facility] Medicine  Discharge Summary      Patient Name: Talia Lawrence  MRN: [id number]  [facility]: [id number]  Patient Class: OP- Observation  Admission Date: 11/13/2022  Hospital Length of Stay: 0 days  Discharge Date and Time:  11/16/2022 4:47 PM  Attending Physician: Maximus Marcos MD   Discharging Provider: STEPHANIE Noyola  Primary Care Provider: Frank Cazares NP    Primary Care Team: Networked reference to record PCT     HPI:   51yo woman history of type 1 diabetes, obesity, HTN, HLD, GERD, CKD stage 2, glaucoma, migraines who presents with multiple episodes of hypoglycemia.  She states she passed out.  EMS was called multiple time.  She was also found to have hypothermia in the ED.   She is followed by Cherrie Rowland for diabetes.    Diabetes medications: Novolog sliding scale, basaglor (55 units bid and recently decreased to 54 units bid).  She states she has not started Farxiga nor has she started Toujeo.      Of note, she states she was having a squeezing pain in her left, lower chest.  She subsequently had a stress test and it was positive.  A LHC was scheduled for 11/15.  Case discussed with cardiology and patient can have the stress test while in-patient since she has hypothermia and intermittent symptoms.        Procedure(s) (LRB):  Angiogram, Coronary, with Left Heart Cath (N/A)      Hospital Course:   11/14:  No further hypoglycemic episodes.  Patient will need follow-up with Endocrinology.    Cardiology consulted for further evaluation given that patient was scheduled for left heart catheterization for abnormal stress test.    11/15: s/p LHC. Non-obstructive CAD.     11/16: Patient is eager to be discharged today. Referral sent for Endocrinologist in [place] per patient's request. Advised patient that MD's nurse will have to review records prior to arranging appointment and reinforced the need for patient to also call their clinic for follow up. She  will follow up with cardiology in 4 weeks and with PCP within 1 week of hospital d/c. Dr. Marcos completed patient's med rec for d/c and also reviewed medications in the room with the patient.    Patient counseled on the importance of keeping all hospital follow up appointments and compliance with medications, therapies, or devices as prescribed in order to provide for the best health outcomes and decrease the risk of hospital readmission. Additionally, patient given written literature regarding their current disease processes and home care recommendations. Patient given opportunity to ask questions and verbalized understanding of all information discussed.          Goals of Care Treatment Preferences:  Code Status: Full Code      Consults:   Consults (From admission, onward)        Status Ordering Provider     Inpatient consult to Social Work  Once        Provider:  (Not yet assigned)    Completed DAVE CERON     Inpatient consult to Cardiology  Once        Provider:  Everardo Raygoza DO    Completed JOE MARCOS          * Hypoglycemia  Recurrent episodes of life-threatening hypoglycemia.   Stop long-acting insulin for now.   Allow blood glucose to run high.   Referral to endocrinology for proven strategies to better manage her blood pressure.   For now, stop all long acting insulin.  Sliding scale only.      50 units of long acting insulin and sliding scale.    Hold if Glucose is less than 150.     Follow-up with PCP and endocrinology, see med rec completed by Dr. Marcos for patient's insulin recommendations at d/c.      Chest pain due to CAD  Patient with chest pain that has features of GERD.   Abnormal stress test and plan for LHC on 11/15 as outpatient.    Cardiology consulted to consider LHC as in-patient.      11/15: LHC.  Non-obstructive CAD.     Prior echo:     Results for orders placed during the hospital encounter of 08/22/22    Echo    Interpretation Summary  · Concentric remodeling and  normal systolic function.  · The estimated ejection fraction is 70%.  · Normal left ventricular diastolic function.  · Mild pulmonic regurgitation.  · Normal right ventricular size with normal right ventricular systolic function.    [facility] summary   Impression:  1. Two-vessel coronary artery disease with  of the mid RCA.    2. Normal left ventricular size with anterior and inferior wall hypokinesis  and overall normal left ventricular systolic function, ejection fraction 60%.    3. No significant mitral regurgitation.      Plan:  1. Will attempt medical management of coronary artery disease and reassess symptoms in clinic in 4-6 weeks.  2. Risk factor modification.  Antiplatelet and anti angina medications.     Gastroesophageal reflux disease without esophagitis  Chest pain may be related to GERD based on location and description.     PO PPI at d/c, follow up outpatient with GI if needed.       Stage 2 chronic kidney disease due to type 2 diabetes mellitus  Will renally dose meds       Obesity  Body mass index is 33.38 kg/m². Morbid obesity complicates all aspects of disease management from diagnostic modalities to treatment. Weight loss encouraged and health benefits explained to patient.     Diabetes mellitus type I  Patient's FSGs are uncontrolled due to hypoglycemia on current medication regimen.  Last A1c reviewed-   Lab Results   Component Value Date    HGBA1C 7.5 (A) 11/02/2022     Most recent fingerstick glucose reviewed- No results for input(s): POCTGLUCOSE in the last 24 hours.  Current correctional scale  Low  Decrease anti-hyperglycemic dose as follows-   Antihyperglycemics (From admission, onward)    Start     Stop Route Frequency Ordered    11/16/22 1333  insulin aspart U-100 injection 1-10 Units         -- SubQ Before meals & nightly PRN 11/16/22 1233    11/16/22 0900  insulin detemir U-100 injection 20 Units         -- SubQ Daily 11/15/22 1901        Hold Oral hypoglycemics while patient is in the  [facility].    Blood glucose to 400 today.      20 units levemir and sliding scale is the plan for her.  Hold if blood glucose less than 150.      Dr. Marcos discussed at the bedside in detail patient's insulin regimen and goal parameters for blood glucose control. Referral made for patient to see Endocrinologist in [place] per patient's request.     Hypertensive disorder  Blood pressure uncontrolled.    Added home blood pressure medications.      D/C home meds completed per Dr. Marcos. Cardiology recommendations followed as well.       Migraine headache without aura  Resume home meds based on clinical course.         Final Active Diagnoses:    Diagnosis Date Noted POA    PRINCIPAL PROBLEM:  Hypoglycemia [E16.2] 11/13/2022 Yes    Chest pain due to CAD [I25.119] 11/13/2022 Yes    Hypothermia [T68.XXXA] 11/13/2022 Yes    Gastroesophageal reflux disease without esophagitis [K21.9] 08/10/2022 Yes     Chronic    Migraine headache without aura [G43.009]  Yes    Hypertensive disorder [I10] 04/13/2021 Yes    Obesity [E66.9] 04/13/2021 Yes    Stage 2 chronic kidney disease due to type 2 diabetes mellitus [E11.22, N18.2] 04/13/2021 Yes    Diabetes mellitus type I [E10.9] 07/20/2012 Yes      Problems Resolved During this Admission:       Discharged Condition: stable    Disposition:     Follow Up:   Follow-up Information     Lillian Myeer, DO Follow up in 1 week(s).    Specialty: Family Medicine  Why: Hospital follow up/ establish care, repeat BMP please follow up on November 23 at 8:15  Contact information:  [address] [facility] [address]  [phone]             Masood Rosado MD Follow up in 4 week(s).    Specialties: Interventional Cardiology, Cardiology  Why: Hospital follow up, 2 vessel CAD please follow up on December 19 at 9:45  Contact information:  [address] [place] [id number]  [phone]             Dee Dee Obregon MD Follow up.    Why: Forms faxed to office for review for appointment;  Their office will reach out to you for appt and time if accepted.  Contact information:  [facility]  Endocrinology  [address] [place] [address]  [phone]                     Patient Instructions:      Diet diabetic     Notify your health care provider if you experience any of the following:  temperature >100.4     Notify your health care provider if you experience any of the following:  persistent nausea and vomiting or diarrhea     Notify your health care provider if you experience any of the following:  severe uncontrolled pain     Activity as tolerated       Significant Diagnostic Studies: Labs:   CBC   Recent Labs   Lab 11/15/22  0410 11/16/22  0404   WBC 8.16 10.00   HGB 12.1 11.6*   HCT 39.1 37.7*    238   , A1C:   Recent Labs   Lab 08/02/22  1059 11/02/22  1050   HGBA1C 8.1* 7.5*    and All labs within the past 24 hours have been reviewed     Medications:  Reconciled Home Medications:      Medication List      START taking these medications    atorvastatin 40 MG tablet  Commonly known as: LIPITOR  Take 1 tablet (40 mg total) by mouth every evening.     isosorbide mononitrate 30 MG 24 hr tablet  Commonly known as: IMDUR  Take 2 tablets (60 mg total) by mouth once daily.  Start taking on: November 17, 2022     ticagrelor 90 mg tablet  Commonly known as: BRILINTA  Take 1 tablet (90 mg total) by mouth 2 (two) times daily.        CHANGE how you take these medications    metoprolol succinate 25 MG 24 hr tablet  Commonly known as: TOPROL-XL  Take 2 tablets (50 mg total) by mouth once daily.  What changed: how much to take        CONTINUE taking these medications    albuterol 90 mcg/actuation inhaler  Commonly known as: PROVENTIL/VENTOLIN HFA  Inhale 2 puffs into the lungs every 6 (six) hours as needed for Wheezing. Rescue     amLODIPine-benazepriL 5-40 mg per capsule  Commonly known as: LOTREL  amlodipine 5 mg-benazepril 40 mg capsule   TAKE ONE CAPSULE BY  "MOUTH EVERY DAY     BD ULTRA-FINE CARLINE PEN NEEDLE 32 gauge x 5/32" Ndle  Generic drug: pen needle, diabetic  Use to inject insulin 6 times daily.     blood sugar diagnostic Strp  Commonly known as: BLOOD GLUCOSE TEST  To check blood sugar 6 times daily     brimonidine 0.2% 0.2 % Drop  Commonly known as: ALPHAGAN  Place 1 drop into both eyes 2 (two) times daily.     cholecalciferol (vitamin D3) 125 mcg (5,000 unit) Tab  Take 5,000 Units by mouth once daily.     dorzolamide-timolol 2-0.5% 22.3-6.8 mg/mL ophthalmic solution  Commonly known as: COSOPT  instill one drop in affected eye twice a day.     fluticasone propionate 50 mcg/actuation nasal spray  Commonly known as: FLONASE  2 sprays by Each Nostril route once daily.     fluticasone-salmeterol 100-50 mcg/dose 100-50 mcg/dose diskus inhaler  Commonly known as: ADVAIR DISKUS  Inhale 1 puff into the lungs 2 (two) times daily. Controller     FREESTYLE CARLOS 2 READER Misc  Generic drug: flash glucose scanning reader  Use as directed.     FREESTYLE CARLOS 2 SENSOR Kit  Generic drug: flash glucose sensor  Apply sensor every 14 days as directed.     GLUCAGON (HCL) EMERGENCY KIT 1 mg Solr  Generic drug: glucagon HCL  Inject as directed. Use as directed for hypoglycemia     hydrOXYzine pamoate 25 MG Cap  Commonly known as: VISTARIL  Take 25 mg by mouth 3 (three) times daily as needed.     insulin aspart U-100 100 unit/mL (3 mL) Inpn pen  Commonly known as: NovoLOG Flexpen U-100 Insulin  Inject sq following sliding scale not to exceed 50 units daily     metoclopramide HCl 10 MG tablet  Commonly known as: REGLAN  Take 10 mg by mouth every 6 (six) hours as needed.     nitroGLYCERIN 0.4 MG SL tablet  Commonly known as: NITROSTAT  Place 1 tablet (0.4 mg total) under the tongue every 5 (five) minutes as needed for Chest pain.     ondansetron 8 MG Tbdl  Commonly known as: ZOFRAN-ODT  ondansetron 8 mg disintegrating tablet   DISSOLVE ONE TABLET ON TONGUE TWICE DAILY AS NEEDED   "   pantoprazole 40 MG tablet  Commonly known as: PROTONIX  Take 2 tablets (80 mg total) by mouth once daily.     promethazine 25 MG tablet  Commonly known as: PHENERGAN  Take 25 mg by mouth every 4 (four) hours. One tablet every 8 hours prn for nausea or headache, no more than 2 in a day or 3 days a week, no driving when taking this medication.     traMADoL 50 mg tablet  Commonly known as: ULTRAM  Take 1 tablet (50 mg total) by mouth every 12 (twelve) hours as needed for Pain (right ankle or hip pain).     TRUE METRIX GLUCOSE METER MISC  by Misc.(Non-Drug; Combo Route) route. Use as directed     UBRELVY 100 mg tablet  Generic drug: ubrogepant  TAKE ONE TABLET BY MOUTH AT ONSET of migraine, MAY REPEAT DOSE AFTER TWO hours. no more THAN TWO doses in APPLY 24 hour period        STOP taking these medications    brompheniramin-phenylephrin-DM 1-2.5-5 mg/5 mL Soln  Commonly known as: RYNEX DM     pravastatin 40 MG tablet  Commonly known as: PRAVACHOL     TOUJEO MAX U-300 SOLOSTAR 300 unit/mL (3 mL) insulin pen  Generic drug: insulin glargine U-300 conc            Indwelling Lines/Drains at time of discharge:   Lines/Drains/Airways     None             The patient has been seen and examined by both myself and Dr Maximus Marcos MD on the date of discharge and determined to be suitable/stable for discharge by Dr. Marcos who also completed medication reconciliation for discharge.    Time spent on the discharge of patient: Greater than 30 minutes         STEPHANIE Noyola  Department of Hospital Medicine  [facility]

## 2022-11-17 NOTE — ASSESSMENT & PLAN NOTE
- not well controlled at present  - pt is only on amlodipine 5mg po daily and metoprolol 25mg po bid currently  - takes lotrel (amlodipine + benazapril) 5/40mg daily at home  - continue amlodipine 5mg po daily and metoprolol 25mg po bid  - add lisinopril 40mg po daily while here in the hospital (benazapril not on our hospital formulary)  - continue to monitor BP  - add/uptitrate meds as needed to control BP    11/16/22  - continue lotrel at discharge  - continue Toprol XL 50mg po daily (was previously on 25mg at home)  - f/u in 4 weeks with Dr. Rosado in cardiology clinic

## 2022-11-17 NOTE — ASSESSMENT & PLAN NOTE
[facility] 11/15/22    Summary         The Prox RCA to Dist RCA lesion was 95% stenosed.    The Dist RCA lesion was 100% stenosed.    The Mid LAD lesion was 50% stenosed.    The ejection fraction was calculated to be 60%.    The left ventricular systolic function was normal.    The left ventricular end diastolic pressure was normal.    The pre-procedure left ventricular end diastolic pressure was 23.    The estimated blood loss was none.    There was two vessel coronary artery disease.     The procedure log was documented by Documenter: Agatha Guerra RN and verified by Masood Rosado MD.     Date: 11/15/2022  Time: 11:33 AM     Impression:    1. Two-vessel coronary artery disease with  of the mid RCA.      2. Normal left ventricular size with anterior and inferior wall hypokinesis  and overall normal left ventricular systolic function, ejection fraction 60%.      3. No significant mitral regurgitation.      Plan:    1. Will attempt medical management of coronary artery disease and reassess symptoms in clinic in 4-6 weeks.    2. Risk factor modification.  Antiplatelet and anti angina medications.        11/16/22:  - continue Brilinta and statin  - pt unable to tolerate ASA due to increases intraocular pressure  - continue Lotrel, Toprol XL, isosorbid mononitrate  - pt to follow up in cardiology clinic with Dr. Rosado in 4 weeks

## 2022-11-17 NOTE — PROGRESS NOTES
[facility]  Cardiology  Progress Note    Patient Name: Talia Lawrence  MRN: [id number]  Admission Date: 11/13/2022  Hospital Length of Stay: 0 days  Code Status: Prior   Attending Physician: No att. providers found   Primary Care Physician: Frank Cazares NP  Expected Discharge Date: 11/16/2022  Principal Problem:Hypoglycemia    Subjective:     Interval History: No complaints.    Review of Systems   Constitutional: Negative for diaphoresis.   Cardiovascular:  Negative for chest pain, irregular heartbeat, leg swelling, orthopnea, palpitations and paroxysmal nocturnal dyspnea.   Respiratory:  Negative for shortness of breath.    Gastrointestinal:  Negative for nausea and vomiting.   Objective:     Vital Signs (Most Recent):  Temp: 98 °F (36.7 °C) (11/16/22 1600)  Pulse: 68 (11/16/22 1600)  Resp: 18 (11/16/22 1600)  BP: (!) 120/58 (11/16/22 1600)  SpO2: 99 % (11/16/22 1600)   Vital Signs (24h Range):  Temp:  [97.8 °F (36.6 °C)-98.7 °F (37.1 °C)] 98 °F (36.7 °C)  Pulse:  [63-68] 68  Resp:  [17-18] 18  SpO2:  [98 %-100 %] 99 %  BP: (119-163)/(55-70) 120/58     Weight: 93.8 kg (206 lb 12.7 oz)  Body mass index is 33.38 kg/m².     SpO2: 99 %  O2 Device (Oxygen Therapy): nasal cannula    No intake or output data in the 24 hours ending 11/16/22 1912    Lines/Drains/Airways       None                   Physical Exam  Vitals reviewed.   Constitutional:       General: She is not in acute distress.     Appearance: Normal appearance.   Eyes:      Pupils: Pupils are equal, round, and reactive to light.   Cardiovascular:      Rate and Rhythm: Normal rate and regular rhythm.      Pulses: Normal pulses.      Heart sounds: Normal heart sounds.   Pulmonary:      Effort: Pulmonary effort is normal.      Breath sounds: Normal breath sounds.   Skin:     General: Skin is warm and dry.   Neurological:      Mental Status: She is alert and oriented to person, place, and time.   Psychiatric:         Mood and  Affect: Mood normal.         Behavior: Behavior normal.       Significant Labs: All pertinent lab results from the last 24 hours have been reviewed. and   Recent Lab Results  (Last 5 results in the past 24 hours)        11/16/22  1552   11/16/22  1439   11/16/22  1332   11/16/22  1237   11/16/22  1039        Gluc Fast       448         POC Glucose 298   364   408     527                              Significant Imaging: Echocardiogram: Transthoracic echo (TTE) complete (Cupid Only):   Results for orders placed or performed during the hospital encounter of 08/22/22   Echo   Result Value Ref Range    BSA 2.12 m2    TDI SEPTAL 0.09 m/s    LV LATERAL E/E' RATIO 6.50 m/s    LV SEPTAL E/E' RATIO 8.67 m/s    IVC diameter 1.47 cm    EF 70 %    AORTIC VALVE CUSP SEPERATION [id number] cm    TDI LATERAL 0.12 m/s    LVIDd 3.94 3.5 - 6.0 cm    IVS 0.92 0.6 - 1.1 cm    Posterior Wall 1.05 0.6 - 1.1 cm    LVIDs 2.08 (A) 2.1 - 4.0 cm    FS 47 28 - 44 %    LV mass 121.46 g    LA size 2.96 cm    Left Ventricle Relative Wall Thickness 0.53 cm    AV mean gradient 5 mmHg    AV valve area 2.31 cm2    AV index (prosthetic) 0.74     E/A ratio 1.00     Mean e' 0.11 m/s    E wave deceleration time 192.00 msec    LVOT diameter 2.00 cm    LVOT area 3.1 cm2    LVOT peak VTI 22.67 cm    Ao peak ronn 1.5 m/s    Ao VTI 30.75 cm    LVOT stroke volume 71.18 cm3    AV peak gradient 9 mmHg    E/E' ratio 7.43 m/s    MV Peak E Ronn 0.78 m/s    MV Peak A Ronn 0.78 m/s    LV Systolic Volume 14.06 mL    LV Systolic Volume Index 6.9 mL/m2    LV Diastolic Volume 67.53 mL    LV Diastolic Volume Index 32.94 mL/m2    LV Mass Index 59 g/m2    Narrative    · Concentric remodeling and normal systolic function.  · The estimated ejection fraction is 70%.  · Normal left ventricular diastolic function.  · Mild pulmonic regurgitation.  · Normal right ventricular size with normal right ventricular systolic   function.        Assessment and Plan:     Brief HPI:  Talia Lawrence is a 52 y.o. female with PMHx of type 1 diabetes, obesity, HTN, HLD, GERD, CKD stage 2, glaucoma, migraines. Pt presented with hypoglycemia and was found to be hypothermic. Pt has had c/o chest tightness and has been seen CARI Askew in clinic who sent pt for stress testing which was positive. Pt was scheduled for [facility] with Dr. Rosado tomorrow. Pt states she has had squeezing chest pain at rest, denies this chest pain with exertion. She denies SOB, diaphoresis, N/V, palpitations, edema, orthopnea, PND. She states the chest pain is relieved when she lays flat on her back and raises her arms above her head. She denies pain on exam today.     Troponin is negative. EKG shows NSR. NTpBNP is WNL. Echo from 08/2022 shows EF 70% with no significant wall motion abnormality and no significant valvular abnormalities.                  * Hypoglycemia  - pt is now hyperglycemic  - primary team managing    CAD (coronary artery disease)  [facility] 11/15/22    Summary         The Prox RCA to Dist RCA lesion was 95% stenosed.    The Dist RCA lesion was 100% stenosed.    The Mid LAD lesion was 50% stenosed.    The ejection fraction was calculated to be 60%.    The left ventricular systolic function was normal.    The left ventricular end diastolic pressure was normal.    The pre-procedure left ventricular end diastolic pressure was 23.    The estimated blood loss was none.    There was two vessel coronary artery disease.     The procedure log was documented by Documenter: Agatha Guerra RN and verified by Masood Rosado MD.     Date: 11/15/2022  Time: 11:33 AM     Impression:    1. Two-vessel coronary artery disease with  of the mid RCA.      2. Normal left ventricular size with anterior and inferior wall hypokinesis  and overall normal left ventricular systolic function, ejection fraction 60%.      3. No significant mitral regurgitation.      Plan:    1. Will attempt medical management of  coronary artery disease and reassess symptoms in clinic in 4-6 weeks.    2. Risk factor modification.  Antiplatelet and anti angina medications.        11/16/22:  - continue Brilinta and statin  - pt unable to tolerate ASA due to increases intraocular pressure  - continue Lotrel, Toprol XL, isosorbid mononitrate  - pt to follow up in cardiology clinic with Dr. Rosado in 4 weeks      Stage 2 chronic kidney disease due to type 2 diabetes mellitus  - creatinine today is 1.22  - continue to monitor    Diabetes mellitus type I  - primary team managing    Hypertensive disorder  - not well controlled at present  - pt is only on amlodipine 5mg po daily and metoprolol 25mg po bid currently  - takes lotrel (amlodipine + benazapril) 5/40mg daily at home  - continue amlodipine 5mg po daily and metoprolol 25mg po bid  - add lisinopril 40mg po daily while here in the hospital (benazapril not on our hospital formulary)  - continue to monitor BP  - add/uptitrate meds as needed to control BP    11/16/22  - continue lotrel at discharge  - continue Toprol XL 50mg po daily (was previously on 25mg at home)  - f/u in 4 weeks with Dr. Rosado in cardiology clinic        VTE Risk Mitigation (From admission, onward)           Ordered     IP VTE HIGH RISK PATIENT  Once         11/13/22 1714                    STEPHANIE Frias  Cardiology  [facility]                Pt seen and examined, chart reviewed, essentially agree with findings as documented.  CC: Shortness of breath, chest pain  HPI: Pt reports chest pain and shortness of breath have resolved.   PE: agree with above  Labs, Xrays, cath films reviewed  IMP/Plan:  Moderate CAD,  RCA, will optimize medical therapy, continue to follow as outpatient

## 2022-11-17 NOTE — SUBJECTIVE & OBJECTIVE
Interval History: No complaints.    Review of Systems   Constitutional: Negative for diaphoresis.   Cardiovascular:  Negative for chest pain, irregular heartbeat, leg swelling, orthopnea, palpitations and paroxysmal nocturnal dyspnea.   Respiratory:  Negative for shortness of breath.    Gastrointestinal:  Negative for nausea and vomiting.   Objective:     Vital Signs (Most Recent):  Temp: 98 °F (36.7 °C) (11/16/22 1600)  Pulse: 68 (11/16/22 1600)  Resp: 18 (11/16/22 1600)  BP: (!) 120/58 (11/16/22 1600)  SpO2: 99 % (11/16/22 1600)   Vital Signs (24h Range):  Temp:  [97.8 °F (36.6 °C)-98.7 °F (37.1 °C)] 98 °F (36.7 °C)  Pulse:  [63-68] 68  Resp:  [17-18] 18  SpO2:  [98 %-100 %] 99 %  BP: (119-163)/(55-70) 120/58     Weight: 93.8 kg (206 lb 12.7 oz)  Body mass index is 33.38 kg/m².     SpO2: 99 %  O2 Device (Oxygen Therapy): nasal cannula    No intake or output data in the 24 hours ending 11/16/22 1912    Lines/Drains/Airways       None                   Physical Exam  Vitals reviewed.   Constitutional:       General: She is not in acute distress.     Appearance: Normal appearance.   Eyes:      Pupils: Pupils are equal, round, and reactive to light.   Cardiovascular:      Rate and Rhythm: Normal rate and regular rhythm.      Pulses: Normal pulses.      Heart sounds: Normal heart sounds.   Pulmonary:      Effort: Pulmonary effort is normal.      Breath sounds: Normal breath sounds.   Skin:     General: Skin is warm and dry.   Neurological:      Mental Status: She is alert and oriented to person, place, and time.   Psychiatric:         Mood and Affect: Mood normal.         Behavior: Behavior normal.       Significant Labs: All pertinent lab results from the last 24 hours have been reviewed. and   Recent Lab Results  (Last 5 results in the past 24 hours)        11/16/22  1552   11/16/22  1439   11/16/22  1332   11/16/22  1237   11/16/22  1039        Gluc Fast       448         POC Glucose 298   364   408     527                               Significant Imaging: Echocardiogram: Transthoracic echo (TTE) complete (Cupid Only):   Results for orders placed or performed during the hospital encounter of 08/22/22   Echo   Result Value Ref Range    BSA 2.12 m2    TDI SEPTAL 0.09 m/s    LV LATERAL E/E' RATIO 6.50 m/s    LV SEPTAL E/E' RATIO 8.67 m/s    IVC diameter 1.47 cm    EF 70 %    AORTIC VALVE CUSP SEPERATION [id number] cm    TDI LATERAL 0.12 m/s    LVIDd 3.94 3.5 - 6.0 cm    IVS 0.92 0.6 - 1.1 cm    Posterior Wall 1.05 0.6 - 1.1 cm    LVIDs 2.08 (A) 2.1 - 4.0 cm    FS 47 28 - 44 %    LV mass 121.46 g    LA size 2.96 cm    Left Ventricle Relative Wall Thickness 0.53 cm    AV mean gradient 5 mmHg    AV valve area 2.31 cm2    AV index (prosthetic) 0.74     E/A ratio 1.00     Mean e' 0.11 m/s    E wave deceleration time 192.00 msec    LVOT diameter 2.00 cm    LVOT area 3.1 cm2    LVOT peak VTI 22.67 cm    Ao peak ronn 1.5 m/s    Ao VTI 30.75 cm    LVOT stroke volume 71.18 cm3    AV peak gradient 9 mmHg    E/E' ratio 7.43 m/s    MV Peak E Ronn 0.78 m/s    MV Peak A Ronn 0.78 m/s    LV Systolic Volume 14.06 mL    LV Systolic Volume Index 6.9 mL/m2    LV Diastolic Volume 67.53 mL    LV Diastolic Volume Index 32.94 mL/m2    LV Mass Index 59 g/m2    Narrative    · Concentric remodeling and normal systolic function.  · The estimated ejection fraction is 70%.  · Normal left ventricular diastolic function.  · Mild pulmonic regurgitation.  · Normal right ventricular size with normal right ventricular systolic   function.

## 2022-11-21 LAB
BACTERIA BLD CULT: NORMAL
BACTERIA BLD CULT: NORMAL

## 2022-11-22 NOTE — PROGRESS NOTES
History:     Patient ID: Talia Lawrence is a 52 y.o. female.    Chief Complaint: Establish Care and Transitional Care    Talia Lawrence is a 52 y.o. female who presents to clinic for follow up of recent hospitalization. She was admitted to [facility] on 11/16/2022 for hypoglycemia and chest pain. The following pertinent tests or procedures were completed during admission: [facility] (non-obstructing CAD). She was discharged home to self care. She has no acute complaints or concerns at this time and is tolerating all medications well with no side effects.     Discharge diagnosis: CAD, diabetes mellitus type 1  Plan of care: medical management   Follow up: PCP (establish care), cardiology, endocrinology, nephrology      Transitional Care Note    Family and/or Caretaker present at visit?  Yes.  Diagnostic tests reviewed/disposition: No diagnosic tests pending after this hospitalization.  Disease/illness education: None needed, patient with good understanding of chronic conditions  Home health/community services discussion/referrals: Patient does not have home health established from hospital visit.  They do not need home health.  If needed, we will set up home health for the patient.   Establishment or re-establishment of referral orders for community resources: No other necessary community resources.   Discussion with other health care providers: No discussion with other health care providers necessary.     Discharge medications reviewed and reconciled:  Discharged medications reconciled with the current medications and No changes in medication since discharge      Talia Lawrence not interested in the [facility] program at this time.       Health Maintenance:  The following health maintenance was discussed with Talia Lawrence.  Screening colonoscopy - states most recent <10 years ago by [facility], will review [facility]  Influenza vaccine - declined, will consider  Eye exam - UTD,  "records requested (Dr. Rai)  Foot exam - UTD, records requested ([facility] podiatry)  Hgb A1C - UTD    Remaining care gaps will be addressed during future visits.        Current Outpatient Medications:     albuterol (PROVENTIL/VENTOLIN HFA) 90 mcg/actuation inhaler, Inhale 2 puffs into the lungs every 6 (six) hours as needed for Wheezing. Rescue, Disp: 18 g, Rfl: 3    amLODIPine-benazepriL (LOTREL) 5-40 mg per capsule, amlodipine 5 mg-benazepril 40 mg capsule  TAKE ONE CAPSULE BY MOUTH EVERY DAY, Disp: 90 capsule, Rfl: 3    atorvastatin (LIPITOR) 40 MG tablet, Take 1 tablet (40 mg total) by mouth every evening., Disp: 90 tablet, Rfl: 0    BD ULTRA-FINE CARLINE PEN NEEDLE 32 gauge x 5/32" Ndle, Use to inject insulin 6 times daily., Disp: 400 each, Rfl: 3    blood sugar diagnostic (BLOOD GLUCOSE TEST) Strp, To check blood sugar 6 times daily, Disp: 600 each, Rfl: 3    blood-glucose meter (TRUE METRIX GLUCOSE METER MISC), by Misc.(Non-Drug; Combo Route) route. Use as directed, Disp: , Rfl:     brimonidine 0.2% (ALPHAGAN) 0.2 % Drop, Place 1 drop into both eyes 2 (two) times daily., Disp: , Rfl:     cholecalciferol, vitamin D3, 125 mcg (5,000 unit) Tab, Take 5,000 Units by mouth once daily., Disp: , Rfl:     dapagliflozin propanediol (FARXIGA ORAL), Take by mouth., Disp: , Rfl:     dorzolamide-timolol 2-0.5% (COSOPT) 22.3-6.8 mg/mL ophthalmic solution, instill one drop in affected eye twice a day., Disp: , Rfl:     flash glucose scanning reader (FREESTYLE CARLOS 2 READER) [facility], Use as directed., Disp: 1 each, Rfl: 0    flash glucose sensor (FREESTYLE CARLOS 2 SENSOR) Kit, Apply sensor every 14 days as directed., Disp: 6 kit, Rfl: 3    fluticasone propionate (FLONASE) 50 mcg/actuation nasal spray, 2 sprays by Each Nostril route once daily., Disp: , Rfl:     fluticasone-salmeterol diskus inhaler 100-50 mcg, Inhale 1 puff into the lungs 2 (two) times daily. Controller, Disp: 180 each, Rfl: 3    glucagon HCL (GLUCAGON, HCL, " EMERGENCY KIT) 1 mg SolR, Inject as directed. Use as directed for hypoglycemia, Disp: , Rfl:     hydrOXYzine pamoate (VISTARIL) 25 MG Cap, Take 25 mg by mouth 3 (three) times daily as needed., Disp: , Rfl:     insulin aspart U-100 (NOVOLOG FLEXPEN U-100 INSULIN) 100 unit/mL (3 mL) InPn pen, Inject sq following sliding scale not to exceed 50 units daily, Disp: 45 mL, Rfl: 3    insulin glargine (LANTUS) 100 unit/mL injection, Inject 45 Units into the skin every evening. BASAGLAR, Disp: , Rfl:     isosorbide mononitrate (IMDUR) 30 MG 24 hr tablet, Take 2 tablets (60 mg total) by mouth once daily., Disp: 60 tablet, Rfl: 0    metoclopramide HCl (REGLAN) 10 MG tablet, Take 10 mg by mouth every 6 (six) hours as needed., Disp: , Rfl:     metoprolol succinate (TOPROL-XL) 25 MG 24 hr tablet, Take 2 tablets (50 mg total) by mouth once daily., Disp: 90 tablet, Rfl: 1    nitroGLYCERIN (NITROSTAT) 0.4 MG SL tablet, Place 1 tablet (0.4 mg total) under the tongue every 5 (five) minutes as needed for Chest pain., Disp: 25 tablet, Rfl: 3    ondansetron (ZOFRAN-ODT) 8 MG TbDL, ondansetron 8 mg disintegrating tablet  DISSOLVE ONE TABLET ON TONGUE TWICE DAILY AS NEEDED, Disp: , Rfl:     pantoprazole (PROTONIX) 40 MG tablet, Take 2 tablets (80 mg total) by mouth once daily., Disp: 60 tablet, Rfl: 2    promethazine (PHENERGAN) 25 MG tablet, Take 25 mg by mouth every 4 (four) hours. One tablet every 8 hours prn for nausea or headache, no more than 2 in a day or 3 days a week, no driving when taking this medication., Disp: , Rfl:     ticagrelor (BRILINTA) 90 mg tablet, Take 1 tablet (90 mg total) by mouth 2 (two) times daily., Disp: 60 tablet, Rfl: 11    traMADoL (ULTRAM) 50 mg tablet, Take 1 tablet (50 mg total) by mouth every 12 (twelve) hours as needed for Pain (right ankle or hip pain)., Disp: 12 tablet, Rfl: 0    MAXITROL 3.5mg/mL-10,000 unit/mL-0.1 % DrpS, Place 1 drop into both eyes 4 (four) times daily., Disp: , Rfl:     UBROGEPANT  100 mg tablet, TAKE ONE TABLET BY MOUTH AT ONSET of migraine, MAY REPEAT DOSE AFTER TWO hours. no more THAN TWO doses in APPLY 24 hour period, Disp: , Rfl:     Review of patient's allergies indicates:   Allergen Reactions    Lisinopril Other (See Comments)     Cough      Lyrica [pregabalin] Other (See Comments)     Feels drunk       Past Medical History:   Diagnosis Date    Diabetes mellitus type I     Disorder of kidney and ureter     Glaucoma     Goiter     HTN (hypertension)     Hyperlipidemia     Migraine headache without aura     Vitamin D deficiency        Past Surgical History:   Procedure Laterality Date    ADENOIDECTOMY      ANGIOGRAM, CORONARY, WITH LEFT HEART CATHETERIZATION N/A 11/15/2022    Procedure: Angiogram, Coronary, with Left Heart Cath;  Surgeon: Masood Rosado MD;  Location: [facility] CATH LAB;  Service: Cardiology;  Laterality: N/A;    ANKLE FRACTURE SURGERY Right     ANKLE SURGERY Right     Plate and screws, fracture repair.    ANKLE SURGERY       SECTION      times 2    COLONOSCOPY  2017    Performed by Dr. Prescott.    EYE SURGERY  Several    FRACTURE SURGERY  2014    HYSTERECTOMY      INJECTION OF FACET JOINT Bilateral 10/25/2018    Lumbar L3-S1 performed by Dr. Summers with Pain Treatment.    MYRINGOTOMY WITH INSERTION OF VENTILATION TUBE Bilateral 2021    Procedure: MYRINGOTOMY, WITH TYMPANOSTOMY TUBE INSERTION (T-TUBES);  Surgeon: Ki Gastelum MD;  Location: [facility] OR;  Service: ENT;  Laterality: Bilateral;    ROTATOR CUFF REPAIR Bilateral     SINUS SURGERY      TENDON RELEASE      TONSILLECTOMY      TUBAL LIGATION  10/7/05       Family History   Problem Relation Age of Onset    Heart disease Mother     Diabetes Mother     Thyroid disease Mother     Hypertension Mother     Diabetes Father     Hypertension Father     Cardiomyopathy Father     Asthma Father     No Known Problems Brother     Arthritis Daughter     Asthma Daughter     Asthma Son     Heart  "disease Maternal Grandmother     Diabetes Maternal Grandmother     Hypertension Maternal Grandmother     Hypertension Paternal Grandmother     Early death Paternal Grandfather     Asthma Son        Social History     Tobacco Use    Smoking status: Never    Smokeless tobacco: Never   Substance Use Topics    Alcohol use: Yes    Drug use: Never       Subjective:     Review of Systems   Constitutional:  Negative for activity change and unexpected weight change.   HENT:  Negative for hearing loss, rhinorrhea and trouble swallowing.    Eyes:  Negative for discharge and visual disturbance.   Respiratory:  Negative for chest tightness and wheezing.    Cardiovascular:  Negative for chest pain and palpitations.   Gastrointestinal:  Negative for blood in stool, constipation, diarrhea and vomiting.   Endocrine: Negative for polydipsia and polyuria.   Genitourinary:  Negative for difficulty urinating, dysuria, hematuria and menstrual problem.   Musculoskeletal:  Negative for arthralgias, joint swelling and neck pain.   Neurological:  Negative for headaches.   Psychiatric/Behavioral:  Negative for confusion and dysphoric mood.          Objective:        Vitals:    11/23/22 0805   BP: (!) 145/67   BP Location: Right arm   Patient Position: Sitting   BP Method: Large (Manual)   Pulse: 82   Resp: 18   Temp: 97.9 °F (36.6 °C)   TempSrc: Oral   SpO2: 100%   Weight: 92.7 kg (204 lb 4.8 oz)   Height: 5' 6" (1.676 m)       Physical Exam  Constitutional:       General: She is not in acute distress.     Appearance: Normal appearance. She is obese. She is not ill-appearing.   HENT:      Head: Normocephalic and atraumatic.      Nose: Nose normal.   Eyes:      General: No scleral icterus.     Extraocular Movements: Extraocular movements intact.      Conjunctiva/sclera: Conjunctivae normal.      Pupils: Pupils are equal, round, and reactive to light.   Cardiovascular:      Rate and Rhythm: Normal rate and regular rhythm.      Pulses: Normal " pulses.   Pulmonary:      Effort: Pulmonary effort is normal. No respiratory distress.   Abdominal:      Palpations: Abdomen is soft.   Musculoskeletal:         General: Normal range of motion.      Cervical back: No rigidity.   Skin:     General: Skin is warm and dry.      Coloration: Skin is not jaundiced or pale.      Findings: No rash.   Neurological:      General: No focal deficit present.      Mental Status: She is alert and oriented to person, place, and time. Mental status is at baseline.   Psychiatric:         Behavior: Behavior normal.         Thought Content: Thought content normal.         Assessment         1. High risk medication use    2. Chronic migraine without aura without status migrainosus, not intractable    3. Type 1 diabetes mellitus with other specified complication    4. Stable proliferative diabetic retinopathy associated with type 2 diabetes mellitus, unspecified laterality    5. Primary hypertension    6. Coronary artery disease involving native heart, unspecified vessel or lesion type, unspecified whether angina present    7. Mixed hyperlipidemia    8. Stage 2 chronic kidney disease due to type 2 diabetes mellitus        Plan:         1. High risk medication use  -     Basic Metabolic Panel; Future; Expected date: 11/23/2022    2. Chronic migraine without aura without status migrainosus, not intractable  Assessment & Plan:  Previously established with [facility] neurology but has not been in some time. Continues to have frequent headaches. Was referred to headache clinic in [place] at one point but they have since closed.     Continue current medications. Will place referral to get re-established with neurology.    Orders:  -     Ambulatory referral/consult to Neurology; Future; Expected date: 11/30/2022    3. Type 1 diabetes mellitus with other specified complication  Assessment & Plan:  She has been seeing Autumn Rowland for diabetes management, however has an appointment to establish care with  endocrinology in [place] early next year.     Currently taking Basaglar 45 units daily with sliding scale. Recently started on Farxiga.     Most recent Hgb A1c was 7.5. Checks blood glucose frequently, generally <200 at home.     Continue current management.    Foot exam within the past 12 months ([facility] podiatry), records requested.      4. Stable proliferative diabetic retinopathy associated with type 2 diabetes mellitus, unspecified laterality  Assessment & Plan:  Established with Dr. Edita Rai for ophthalmology with regular follow up (3-4 times annually). Continue current care.    Records requested.      5. Primary hypertension  Assessment & Plan:  Slightly elevated in clinic today, however generally WNL on chart review. Patient already experiencing some fatigue from new cardiovascular medications added during this hospitalization, will defer any changes until her cardiology follow up in December.       6. Coronary artery disease involving native heart, unspecified vessel or lesion type, unspecified whether angina present  Assessment & Plan:  Recent C with non-obstructing CAD. Patient started on Imdur, Brilinta, and statin at discharge, already on beta blocker and Farxiga. She denies any recurrent chest pain. Does report some fatigue after starting new medications which is slowly improving.    Follow up scheduled for December 19th, continue current care in the meantime.       7. Mixed hyperlipidemia  Assessment & Plan:  Continue statin, repeat lipid panel at least annually.      8. Stage 2 chronic kidney disease due to type 2 diabetes mellitus  Assessment & Plan:  Established with Dr. Rodriguez ([facility]) for nephrology. Repeat BMP today to recheck hyponatremia and increased Cr during admission.     Continue current care, follow up as scheduled in December.         Follow up in about 6 months (around 5/23/2023).    Lillian Meyer DO

## 2022-11-23 ENCOUNTER — TELEPHONE (OUTPATIENT)
Dept: PRIMARY CARE CLINIC | Facility: CLINIC | Age: 52
End: 2022-11-23
Payer: OTHER GOVERNMENT

## 2022-11-23 ENCOUNTER — OFFICE VISIT (OUTPATIENT)
Dept: PRIMARY CARE CLINIC | Facility: CLINIC | Age: 52
End: 2022-11-23
Payer: OTHER GOVERNMENT

## 2022-11-23 VITALS
WEIGHT: 204.31 LBS | DIASTOLIC BLOOD PRESSURE: 67 MMHG | OXYGEN SATURATION: 100 % | TEMPERATURE: 98 F | HEIGHT: 66 IN | HEART RATE: 82 BPM | BODY MASS INDEX: 32.83 KG/M2 | RESPIRATION RATE: 18 BRPM | SYSTOLIC BLOOD PRESSURE: 145 MMHG

## 2022-11-23 DIAGNOSIS — E10.69 TYPE 1 DIABETES MELLITUS WITH OTHER SPECIFIED COMPLICATION: ICD-10-CM

## 2022-11-23 DIAGNOSIS — G43.709 CHRONIC MIGRAINE WITHOUT AURA WITHOUT STATUS MIGRAINOSUS, NOT INTRACTABLE: ICD-10-CM

## 2022-11-23 DIAGNOSIS — E78.2 MIXED HYPERLIPIDEMIA: ICD-10-CM

## 2022-11-23 DIAGNOSIS — E11.3559 STABLE PROLIFERATIVE DIABETIC RETINOPATHY ASSOCIATED WITH TYPE 2 DIABETES MELLITUS, UNSPECIFIED LATERALITY: ICD-10-CM

## 2022-11-23 DIAGNOSIS — Z79.899 HIGH RISK MEDICATION USE: Primary | ICD-10-CM

## 2022-11-23 DIAGNOSIS — E11.22 STAGE 2 CHRONIC KIDNEY DISEASE DUE TO TYPE 2 DIABETES MELLITUS: ICD-10-CM

## 2022-11-23 DIAGNOSIS — I10 PRIMARY HYPERTENSION: ICD-10-CM

## 2022-11-23 DIAGNOSIS — I25.10 CORONARY ARTERY DISEASE INVOLVING NATIVE HEART, UNSPECIFIED VESSEL OR LESION TYPE, UNSPECIFIED WHETHER ANGINA PRESENT: ICD-10-CM

## 2022-11-23 DIAGNOSIS — N18.2 STAGE 2 CHRONIC KIDNEY DISEASE DUE TO TYPE 2 DIABETES MELLITUS: ICD-10-CM

## 2022-11-23 PROCEDURE — 99214 OFFICE O/P EST MOD 30 MIN: CPT | Mod: ,,, | Performed by: FAMILY MEDICINE

## 2022-11-23 PROCEDURE — 99214 PR OFFICE/OUTPT VISIT, EST, LEVL IV, 30-39 MIN: ICD-10-PCS | Mod: ,,, | Performed by: FAMILY MEDICINE

## 2022-11-23 RX ORDER — INSULIN GLARGINE 100 [IU]/ML
45 INJECTION, SOLUTION SUBCUTANEOUS NIGHTLY
COMMUNITY
End: 2023-02-21

## 2022-11-23 RX ORDER — NEOMYCIN SULFATE, POLYMYXIN B SULFATE AND DEXAMETHASONE 1; 3.5; 1 MG/ML; MG/ML; [USP'U]/ML
1 SUSPENSION OPHTHALMIC 4 TIMES DAILY
COMMUNITY
Start: 2022-11-03 | End: 2023-01-25

## 2022-11-23 NOTE — ASSESSMENT & PLAN NOTE
She has been seeing Autumn Rowland for diabetes management, however has an appointment to establish care with endocrinology in [place] early next year.     Currently taking Basaglar 45 units daily with sliding scale. Recently started on Farxiga.     Most recent Hgb A1c was 7.5. Checks blood glucose frequently, generally <200 at home.     Continue current management.    Foot exam within the past 12 months ([facility] podiatry), records requested.

## 2022-11-23 NOTE — ASSESSMENT & PLAN NOTE
Previously established with [facility] neurology but has not been in some time. Continues to have frequent headaches. Was referred to headache clinic in [place] at one point but they have since closed.     Continue current medications. Will place referral to get re-established with neurology.

## 2022-11-23 NOTE — ASSESSMENT & PLAN NOTE
Slightly elevated in clinic today, however generally WNL on chart review. Patient already experiencing some fatigue from new cardiovascular medications added during this hospitalization, will defer any changes until her cardiology follow up in December.

## 2022-11-23 NOTE — ASSESSMENT & PLAN NOTE
Established with Dr. Rodriguez ([facility]) for nephrology. Repeat BMP today to recheck hyponatremia and increased Cr during admission.     Continue current care, follow up as scheduled in December.

## 2022-11-23 NOTE — ASSESSMENT & PLAN NOTE
Established with Dr. Edita Rai for ophthalmology with regular follow up (3-4 times annually). Continue current care.    Records requested.

## 2022-11-23 NOTE — TELEPHONE ENCOUNTER
----- Message from Lillian Meyer DO sent at 11/23/2022 12:13 PM CST -----  BMP looks fine. Sodium is still a little low but is better than last week. Kidney function also has improved since discharge from the hospital. Continue current management, follow up with cardiology, nephrology, and endocrinology as scheduled.

## 2022-11-23 NOTE — ASSESSMENT & PLAN NOTE
Recent LHC with non-obstructing CAD. Patient started on Imdur, Brilinta, and statin at discharge, already on beta blocker and Farxiga. She denies any recurrent chest pain. Does report some fatigue after starting new medications which is slowly improving.    Follow up scheduled for December 19th, continue current care in the meantime.

## 2022-12-14 RX ORDER — ISOSORBIDE MONONITRATE 30 MG/1
60 TABLET, EXTENDED RELEASE ORAL DAILY
Qty: 180 TABLET | Refills: 3 | Status: SHIPPED | OUTPATIENT
Start: 2022-12-14 | End: 2022-12-16 | Stop reason: SDUPTHER

## 2022-12-14 RX ORDER — ATORVASTATIN CALCIUM 40 MG/1
40 TABLET, FILM COATED ORAL NIGHTLY
Qty: 90 TABLET | Refills: 3 | Status: SHIPPED | OUTPATIENT
Start: 2022-12-14 | End: 2023-06-13 | Stop reason: SDUPTHER

## 2022-12-15 NOTE — PROGRESS NOTES
"    Subjective:       Patient ID: Talia Lawrence is a 52 y.o. female     Chief Complaint:    Chief Complaint   Patient presents with    Follow-up    Migraine        Allergies:  Lisinopril and Lyrica [pregabalin]    Current Medications:    Outpatient Encounter Medications as of 12/16/2022   Medication Sig Dispense Refill    albuterol (PROVENTIL/VENTOLIN HFA) 90 mcg/actuation inhaler Inhale 2 puffs into the lungs every 6 (six) hours as needed for Wheezing. Rescue 18 g 3    amLODIPine-benazepriL (LOTREL) 5-40 mg per capsule amlodipine 5 mg-benazepril 40 mg capsule   TAKE ONE CAPSULE BY MOUTH EVERY DAY 90 capsule 3    atorvastatin (LIPITOR) 40 MG tablet Take 1 tablet (40 mg total) by mouth every evening. 90 tablet 3    BD ULTRA-FINE CARLINE PEN NEEDLE 32 gauge x 5/32" Ndle Use to inject insulin 6 times daily. 400 each 3    blood sugar diagnostic (BLOOD GLUCOSE TEST) Strp To check blood sugar 6 times daily 600 each 3    blood-glucose meter (TRUE METRIX GLUCOSE METER MISC) by Misc.(Non-Drug; Combo Route) route. Use as directed      brimonidine 0.2% (ALPHAGAN) 0.2 % Drop Place 1 drop into both eyes 2 (two) times daily.      cholecalciferol, vitamin D3, 125 mcg (5,000 unit) Tab Take 5,000 Units by mouth once daily.      dapagliflozin propanediol (FARXIGA ORAL) Take by mouth.      dorzolamide-timolol 2-0.5% (COSOPT) 22.3-6.8 mg/mL ophthalmic solution instill one drop in affected eye twice a day.      flash glucose scanning reader (FREESTYLE CARLOS 2 READER) [facility] [place]. 1 each 0    flash glucose sensor (FREESTYLE CAROLS 2 SENSOR) Kit Apply sensor every 14 days as directed. 6 kit 3    fluticasone propionate (FLONASE) 50 mcg/actuation nasal spray 2 sprays by Each Nostril route once daily.      fluticasone-salmeterol diskus inhaler 100-50 mcg Inhale 1 puff into the lungs 2 (two) times daily. Controller 180 each 3    glucagon HCL (GLUCAGON, HCL, EMERGENCY KIT) 1 mg SolR Inject as directed. Use as directed for " hypoglycemia      hydrOXYzine pamoate (VISTARIL) 25 MG Cap Take 25 mg by mouth 3 (three) times daily as needed.      insulin aspart U-100 (NOVOLOG FLEXPEN U-100 INSULIN) 100 unit/mL (3 mL) InPn pen Inject sq following sliding scale not to exceed 50 units daily 45 mL 3    insulin glargine (LANTUS) 100 unit/mL injection Inject 45 Units into the skin every evening. BASAGLAR      isosorbide mononitrate (IMDUR) 30 MG 24 hr tablet Take 2 tablets (60 mg total) by mouth once daily. 180 tablet 3    MAXITROL 3.5mg/mL-10,000 unit/mL-0.1 % DrpS Place 1 drop into both eyes 4 (four) times daily.      metoclopramide HCl (REGLAN) 10 MG tablet Take 10 mg by mouth every 6 (six) hours as needed.      nitroGLYCERIN (NITROSTAT) 0.4 MG SL tablet Place 1 tablet (0.4 mg total) under the tongue every 5 (five) minutes as needed for Chest pain. 25 tablet 3    ondansetron (ZOFRAN-ODT) 8 MG TbDL ondansetron 8 mg disintegrating tablet   DISSOLVE ONE TABLET ON TONGUE TWICE DAILY AS NEEDED      pantoprazole (PROTONIX) 40 MG tablet Take 2 tablets (80 mg total) by mouth once daily. 60 tablet 2    promethazine (PHENERGAN) 25 MG tablet Take 25 mg by mouth every 4 (four) hours. One tablet every 8 hours prn for nausea or headache, no more than 2 in a day or 3 days a week, no driving when taking this medication.      ticagrelor (BRILINTA) 90 mg tablet Take 1 tablet (90 mg total) by mouth 2 (two) times daily. 180 tablet 3    atogepant (QULIPTA) 60 mg Tab Take 60 mg by mouth once daily. 90 tablet 3    metoprolol succinate (TOPROL-XL) 25 MG 24 hr tablet Take 2 tablets (50 mg total) by mouth once daily. (Patient not taking: Reported on 12/16/2022) 90 tablet 1    traMADoL (ULTRAM) 50 mg tablet Take 1 tablet (50 mg total) by mouth every 12 (twelve) hours as needed for Pain (right ankle or hip pain). (Patient not taking: Reported on 12/16/2022) 12 tablet 0    UBROGEPANT 100 mg tablet TAKE ONE TABLET BY MOUTH AT ONSET of migraine, MAY REPEAT DOSE AFTER TWO  hours. no more THAN TWO doses in APPLY 24 hour period      [DISCONTINUED] atorvastatin (LIPITOR) 40 MG tablet Take 1 tablet (40 mg total) by mouth every evening. 90 tablet 0    [DISCONTINUED] isosorbide mononitrate (IMDUR) 30 MG 24 hr tablet Take 2 tablets (60 mg total) by mouth once daily. 60 tablet 0    [DISCONTINUED] ticagrelor (BRILINTA) 90 mg tablet Take 1 tablet (90 mg total) by mouth 2 (two) times daily. 60 tablet 11     No facility-administered encounter medications on file as of 12/16/2022.       History of Present Illness  53 y/o female following in neurology for migraine headaches.  Last seen in neurology 5/17/2021.    Prior treated with topamax but developed glaucoma and it was stopped.  Currently on metoprolol, still reporting 15 headache days per month.  Prior on pamelor as well without improvement.  At her last visit was apparently also following with headache clinic in [place] and was on Emgality once monthly injections.  She did not like taking the shots, feels she is already on too many injections.  She would prefer to try an oral medication.  Qulipta is an option, given she already has x3 preventive failures.  She is denying overuse headaches.  She is not on botox.    She has CAD, followed by Dr. Rosado, so she is not able to use any triptan medications.    Prior sleep apnea workup was found negative.         Review of Systems  Review of Systems   Constitutional:  Negative for diaphoresis and fever.   HENT:  Negative for congestion, hearing loss and tinnitus.    Eyes:  Negative for blurred vision, double vision, photophobia, discharge and redness.   Respiratory:  Negative for cough and shortness of breath.    Cardiovascular:  Negative for chest pain.   Gastrointestinal:  Negative for abdominal pain, nausea and vomiting.   Musculoskeletal:  Negative for back pain, joint pain, myalgias and neck pain.   Skin:  Negative for itching and rash.   Neurological:  Positive for headaches. Negative for  dizziness, tremors, sensory change, speech change, focal weakness, seizures, loss of consciousness and weakness.   Psychiatric/Behavioral:  Negative for depression, hallucinations and memory loss. The patient does not have insomnia.    All other systems reviewed and are negative.   Objective:     NEUROLOGICAL EXAMINATION:     MENTAL STATUS   Oriented to person, place, and time.   Registration: recalls 3 of 3 objects. Recall at 5 minutes: recalls 3 of 3 objects.   Attention: normal. Concentration: normal.   Speech: speech is normal   Level of consciousness: alert  Knowledge: good and consistent with education.   Normal comprehension.     CRANIAL NERVES     CN II   Visual fields full to confrontation.   Visual acuity: normal  Right visual field deficit: none  Left visual field deficit: none     CN III, IV, VI   Pupils are equal, round, and reactive to light.  Extraocular motions are normal.   Right pupil: Size: 3 mm. Shape: regular. Reactivity: brisk. Consensual response: intact. Accommodation: intact.   Left pupil: Size: 3 mm. Shape: regular. Reactivity: brisk. Consensual response: intact. Accommodation: intact.   CN III: no CN III palsy  CN VI: no CN VI palsy  Nystagmus: none   Diplopia: none  Upgaze: normal  Downgaze: normal  Conjugate gaze: present  Vestibulo-ocular reflex: present    CN V   Facial sensation intact.   Right facial sensation deficit: none  Left facial sensation deficit: none  Right corneal reflex: normal  Left corneal reflex: normal    CN VII   Facial expression full, symmetric.   Right facial weakness: none  Left facial weakness: none  Right taste: normal  Left taste: normal    CN VIII   CN VIII normal.   Hearing: intact    CN IX, X   CN IX normal.   CN X normal.   Palate: symmetric    CN XI   CN XI normal.   Right sternocleidomastoid strength: normal  Left sternocleidomastoid strength: normal  Right trapezius strength: normal  Left trapezius strength: normal    CN XII   CN XII normal.   Tongue:  not atrophic  Fasciculations: absent  Tongue deviation: none    MOTOR EXAM   Muscle bulk: normal  Overall muscle tone: normal  Right arm tone: normal  Left arm tone: normal  Right arm pronator drift: absent  Left arm pronator drift: absent  Right leg tone: normal  Left leg tone: normal    Strength   Right neck flexion: 5/5  Left neck flexion: 5/5  Right neck extension: 5/5  Left neck extension: /5  Right deltoid: /5  Left deltoid:   Right biceps: 5  Left biceps: 5  Right triceps: 5/5  Left triceps: 5/  Right wrist flexion: 5  Left wrist flexion:   Right wrist extension:   Left wrist extension:   Right interossei:   Left interossei:   Right iliopsoas:   Left iliopsoas:   Right quadriceps:   Left quadriceps:   Right hamstrin/5  Left hamstrin/5  Right anterior tibial:   Left anterior tibial:   Right posterior tibial:   Left posterior tibial:   Right gastroc:   Left gastroc: /    REFLEXES     Reflexes   Right brachioradialis: 2+  Left brachioradialis: 2+  Right biceps: 2+  Left biceps: 2+  Right triceps: 2+  Left triceps: 2+  Right patellar: 2+  Left patellar: 2+  Right achilles: 2+  Left achilles: 2+  Right plantar: normal  Left plantar: normal  Right Avilez: absent  Left Avilez: absent  Right ankle clonus: absent  Left ankle clonus: absent  Right pendular knee jerk: absent  Left pendular knee jerk: absent    SENSORY EXAM   Light touch normal.   Right arm light touch: normal  Left arm light touch: normal  Right leg light touch: normal  Left leg light touch: normal  Vibration normal.   Right arm vibration: normal  Left arm vibration: normal  Right leg vibration: normal  Left leg vibration: normal  Proprioception normal.   Right arm proprioception: normal  Left arm proprioception: normal  Right leg proprioception: normal  Left leg proprioception: normal  Pinprick normal.   Right arm pinprick: normal  Left arm pinprick: normal  Right leg pinprick:  normal  Left leg pinprick: normal  Graphesthesia: normal  Romberg: negative  Stereognosis: normal    GAIT AND COORDINATION     Gait  Gait: normal     Coordination   Finger to nose coordination: normal  Heel to shin coordination: normal  Tandem walking coordination: normal    Tremor   Resting tremor: absent  Intention tremor: absent  Action tremor: absent     Physical Exam  Vitals and nursing note reviewed.   Constitutional:       Appearance: Normal appearance.   HENT:      Head: Normocephalic.   Eyes:      Extraocular Movements: Extraocular movements intact and EOM normal.      Pupils: Pupils are equal, round, and reactive to light.   Cardiovascular:      Rate and Rhythm: Normal rate and regular rhythm.   Pulmonary:      Effort: Pulmonary effort is normal.      Breath sounds: Normal breath sounds.   Musculoskeletal:         General: No swelling or tenderness. Normal range of motion.      Cervical back: Normal range of motion and neck supple.      Right lower leg: No edema.      Left lower leg: No edema.   Skin:     General: Skin is warm and dry.      Coloration: Skin is not jaundiced.      Findings: No rash.   Neurological:      General: No focal deficit present.      Mental Status: She is alert and oriented to person, place, and time.      GCS: GCS eye subscore is 4. GCS verbal subscore is 5. GCS motor subscore is 6.      Cranial Nerves: No cranial nerve deficit.      Sensory: No sensory deficit.      Motor: Motor function is intact. No weakness.      Coordination: Coordination is intact. Coordination normal. Finger-Nose-Finger Test, Heel to Shin Test and Romberg Test normal.      Gait: Gait is intact. Gait and tandem walk normal.      Deep Tendon Reflexes: Reflexes normal.      Reflex Scores:       Tricep reflexes are 2+ on the right side and 2+ on the left side.       Bicep reflexes are 2+ on the right side and 2+ on the left side.       Brachioradialis reflexes are 2+ on the right side and 2+ on the left side.        Patellar reflexes are 2+ on the right side and 2+ on the left side.       Achilles reflexes are 2+ on the right side and 2+ on the left side.  Psychiatric:         Mood and Affect: Mood normal.         Speech: Speech normal.         Behavior: Behavior normal.        Assessment:     Problem List Items Addressed This Visit          Neuro    Chronic migraine without aura    Relevant Medications    atogepant (QULIPTA) 60 mg Tab       Cardiac/Vascular    CAD (coronary artery disease) - Primary        Primary Diagnosis and ICD10  Coronary artery disease involving native heart, unspecified vessel or lesion type, unspecified whether angina present [I25.10]    Plan:     Patient Instructions   Will work with insurance to see if Qulipta is covered  May try once monthly injections if not  Nurtec samples given in clinic  Labs next visit    There are no discontinued medications.    Requested Prescriptions     Signed Prescriptions Disp Refills    atogepant (QULIPTA) 60 mg Tab 90 tablet 3     Sig: Take 60 mg by mouth once daily.       No orders of the defined types were placed in this encounter.

## 2022-12-16 ENCOUNTER — OFFICE VISIT (OUTPATIENT)
Dept: NEUROLOGY | Facility: CLINIC | Age: 52
End: 2022-12-16
Payer: OTHER GOVERNMENT

## 2022-12-16 VITALS
BODY MASS INDEX: 32.78 KG/M2 | OXYGEN SATURATION: 98 % | HEIGHT: 66 IN | RESPIRATION RATE: 18 BRPM | WEIGHT: 204 LBS | HEART RATE: 90 BPM | DIASTOLIC BLOOD PRESSURE: 70 MMHG | SYSTOLIC BLOOD PRESSURE: 126 MMHG

## 2022-12-16 DIAGNOSIS — G43.709 CHRONIC MIGRAINE WITHOUT AURA WITHOUT STATUS MIGRAINOSUS, NOT INTRACTABLE: ICD-10-CM

## 2022-12-16 DIAGNOSIS — I25.10 CORONARY ARTERY DISEASE INVOLVING NATIVE HEART, UNSPECIFIED VESSEL OR LESION TYPE, UNSPECIFIED WHETHER ANGINA PRESENT: Primary | ICD-10-CM

## 2022-12-16 PROCEDURE — 99213 OFFICE O/P EST LOW 20 MIN: CPT | Mod: S$PBB,,, | Performed by: NURSE PRACTITIONER

## 2022-12-16 PROCEDURE — 99213 PR OFFICE/OUTPT VISIT, EST, LEVL III, 20-29 MIN: ICD-10-PCS | Mod: S$PBB,,, | Performed by: NURSE PRACTITIONER

## 2022-12-16 PROCEDURE — 99214 OFFICE O/P EST MOD 30 MIN: CPT | Mod: PBBFAC | Performed by: NURSE PRACTITIONER

## 2022-12-16 RX ORDER — ATOGEPANT 60 MG/1
60 TABLET ORAL DAILY
Qty: 90 TABLET | Refills: 3 | Status: SHIPPED | OUTPATIENT
Start: 2022-12-16 | End: 2023-11-09 | Stop reason: SDUPTHER

## 2022-12-16 RX ORDER — ISOSORBIDE MONONITRATE 30 MG/1
60 TABLET, EXTENDED RELEASE ORAL DAILY
Qty: 180 TABLET | Refills: 3 | Status: SHIPPED | OUTPATIENT
Start: 2022-12-16 | End: 2023-01-18 | Stop reason: SDUPTHER

## 2022-12-16 NOTE — PATIENT INSTRUCTIONS
Will work with insurance to see if Qulipta is covered  May try once monthly injections if not  Nurtec samples given in clinic  Labs next visit

## 2022-12-19 ENCOUNTER — OFFICE VISIT (OUTPATIENT)
Dept: CARDIOLOGY | Facility: CLINIC | Age: 52
End: 2022-12-19
Payer: OTHER GOVERNMENT

## 2022-12-19 VITALS
BODY MASS INDEX: 32.95 KG/M2 | WEIGHT: 205 LBS | DIASTOLIC BLOOD PRESSURE: 62 MMHG | SYSTOLIC BLOOD PRESSURE: 132 MMHG | OXYGEN SATURATION: 97 % | HEART RATE: 79 BPM | HEIGHT: 66 IN

## 2022-12-19 DIAGNOSIS — I25.10 CORONARY ARTERY DISEASE INVOLVING NATIVE CORONARY ARTERY OF NATIVE HEART WITHOUT ANGINA PECTORIS: ICD-10-CM

## 2022-12-19 DIAGNOSIS — E10.69 TYPE 1 DIABETES MELLITUS WITH OTHER SPECIFIED COMPLICATION: ICD-10-CM

## 2022-12-19 DIAGNOSIS — Z79.899 HIGH RISK MEDICATION USE: Primary | ICD-10-CM

## 2022-12-19 DIAGNOSIS — E78.5 HYPERLIPIDEMIA, UNSPECIFIED HYPERLIPIDEMIA TYPE: ICD-10-CM

## 2022-12-19 PROCEDURE — 99214 OFFICE O/P EST MOD 30 MIN: CPT | Mod: S$PBB,,, | Performed by: NURSE PRACTITIONER

## 2022-12-19 PROCEDURE — 99213 OFFICE O/P EST LOW 20 MIN: CPT | Mod: PBBFAC | Performed by: NURSE PRACTITIONER

## 2022-12-19 PROCEDURE — 99214 PR OFFICE/OUTPT VISIT, EST, LEVL IV, 30-39 MIN: ICD-10-PCS | Mod: S$PBB,,, | Performed by: NURSE PRACTITIONER

## 2022-12-19 RX ORDER — AMLODIPINE AND BENAZEPRIL HYDROCHLORIDE 5; 40 MG/1; MG/1
1 CAPSULE ORAL DAILY
Qty: 90 CAPSULE | Refills: 3 | Status: SHIPPED | OUTPATIENT
Start: 2022-12-19 | End: 2023-06-13 | Stop reason: SDUPTHER

## 2022-12-19 NOTE — PROGRESS NOTES
"PCP: Lillian Meyer DO    Referring Provider:     Subjective:   Talia Lawrence is a 52 y.o. female with hx of DM, type I, CAD, HTN and HLD. who presents for HD follow up from [facility] which demonstrated 2 vessel CAD with a 50% mid LAD stenosis and a  of the RCA with collateral circulation from the left system. Her LVEF was normal. She is currently asymptomatic on current medical management. She does state that she her pills "get stuck" when she tries to swallow them. She did have recent esophageal dilation in 10/2022.         Fhx:Mother  + heart disease, thyroid disease and DM  Father + DM, cardiomyopathy, HTN  Brother with no known health problems  Shx : + tobacco or recreational drugs; + ETOH      EKG   Results for orders placed or performed during the hospital encounter of 22   EKG 12-lead    Collection Time: 11/15/22  1:21 PM    Narrative    Test Reason : R07.9,    Vent. Rate : 057 BPM     Atrial Rate : 057 BPM     P-R Int : 138 ms          QRS Dur : 080 ms      QT Int : 422 ms       P-R-T Axes : 016 068 -01 degrees     QTc Int : 410 ms    Sinus bradycardia  Low voltage QRS  Abnormal QRS-T angle, consider primary T wave abnormality  Abnormal ECG  When compared with ECG of 2022 13:52,  QT has shortened  Confirmed by Maximus Marcos MD ([id number]) on 11/15/2022 7:54:10 PM    Referred By: [email or web address]   SELF           Confirmed By:Maximus Marcos MD      ECHO Results for orders placed during the hospital encounter of 22    Echo    Interpretation Summary  · Concentric remodeling and normal systolic function.  · The estimated ejection fraction is 70%.  · Normal left ventricular diastolic function.  · Mild pulmonic regurgitation.  · Normal right ventricular size with normal right ventricular systolic function.   [facility] Results for orders placed during the hospital encounter of 22    Cardiac catheterization    Conclusion    The Prox RCA to Dist RCA lesion was 95% stenosed.    The Dist RCA " lesion was 100% stenosed.    The Mid LAD lesion was 50% stenosed.    The ejection fraction was calculated to be 60%.    The left ventricular systolic function was normal.    The left ventricular end diastolic pressure was normal.    The pre-procedure left ventricular end diastolic pressure was 23.    The estimated blood loss was none.    There was two vessel coronary artery disease.    The procedure log was documented by Documenter: Agatha Guerra RN and verified by Masood Rosado MD.    Date: 11/15/2022  Time: 11:33 AM       Lab Results   Component Value Date     (L) 11/23/2022    K 4.3 11/23/2022     11/23/2022    CO2 24 11/23/2022    BUN 24 (H) 11/23/2022    CREATININE 1.33 (H) 11/23/2022    CALCIUM 9.0 11/23/2022    ANIONGAP 11 11/23/2022    ESTGFRAFRICA 45 (L) 04/22/2021    EGFRNONAA 52 (L) 04/28/2022       Lab Results   Component Value Date    CHOL 172 04/28/2022    CHOL 170 04/22/2021    CHOL 155 10/22/2020     Lab Results   Component Value Date    HDL 42 04/28/2022    HDL 43 04/22/2021    HDL 53 10/22/2020     Lab Results   Component Value Date    LDLCALC 66 04/28/2022    LDLCALC 96 04/22/2021    LDLCALC 85 10/22/2020     Lab Results   Component Value Date    TRIG 321 (H) 04/28/2022    TRIG 157 (H) 04/22/2021    TRIG 83 10/22/2020     Lab Results   Component Value Date    CHOLHDL 4.1 04/28/2022    CHOLHDL 4.0 04/22/2021    CHOLHDL 2.9 10/22/2020       Lab Results   Component Value Date    WBC 10.00 11/16/2022    HGB 11.6 (L) 11/16/2022    HCT 37.7 (L) 11/16/2022    MCV 77.9 (L) 11/16/2022     11/16/2022           Current Outpatient Medications:     albuterol (PROVENTIL/VENTOLIN HFA) 90 mcg/actuation inhaler, Inhale 2 puffs into the lungs every 6 (six) hours as needed for Wheezing. Rescue, Disp: 18 g, Rfl: 3    amLODIPine-benazepriL (LOTREL) 5-40 mg per capsule, Take 1 capsule by mouth once daily. amlodipine 5 mg-benazepril 40 mg capsule  TAKE ONE CAPSULE BY MOUTH EVERY DAY   "amlodipine 5 mg-benazepril 40 mg capsule  TAKE ONE CAPSULE BY MOUTH EVERY DAY, Disp: 90 capsule, Rfl: 3    atogepant (QULIPTA) 60 mg Tab, Take 60 mg by mouth once daily., Disp: 90 tablet, Rfl: 3    atorvastatin (LIPITOR) 40 MG tablet, Take 1 tablet (40 mg total) by mouth every evening., Disp: 90 tablet, Rfl: 3    BD ULTRA-FINE CARLINE PEN NEEDLE 32 gauge x 5/32" Ndle, Use to inject insulin 6 times daily., Disp: 400 each, Rfl: 3    blood sugar diagnostic (BLOOD GLUCOSE TEST) Strp, To check blood sugar 6 times daily, Disp: 600 each, Rfl: 3    blood-glucose meter (TRUE METRIX GLUCOSE METER MISC), by Misc.(Non-Drug; Combo Route) route. Use as directed, Disp: , Rfl:     brimonidine 0.2% (ALPHAGAN) 0.2 % Drop, Place 1 drop into both eyes 2 (two) times daily., Disp: , Rfl:     cholecalciferol, vitamin D3, 125 mcg (5,000 unit) Tab, Take 5,000 Units by mouth once daily., Disp: , Rfl:     dapagliflozin propanediol (FARXIGA ORAL), Take by mouth., Disp: , Rfl:     dorzolamide-timolol 2-0.5% (COSOPT) 22.3-6.8 mg/mL ophthalmic solution, instill one drop in affected eye twice a day., Disp: , Rfl:     flash glucose scanning reader (FREESTYLE CARLOS 2 READER) [facility] [place], Use as directed., Disp: 1 each, Rfl: 0    flash glucose sensor (FREESTYLE CARLOS 2 SENSOR) Kit, Apply sensor every 14 days as directed., Disp: 6 kit, Rfl: 3    fluticasone propionate (FLONASE) 50 mcg/actuation nasal spray, 2 sprays by Each Nostril route once daily., Disp: , Rfl:     fluticasone-salmeterol diskus inhaler 100-50 mcg, Inhale 1 puff into the lungs 2 (two) times daily. Controller, Disp: 180 each, Rfl: 3    glucagon HCL (GLUCAGON, HCL, EMERGENCY KIT) 1 mg SolR, Inject as directed. Use as directed for hypoglycemia, Disp: , Rfl:     hydrOXYzine pamoate (VISTARIL) 25 MG Cap, Take 25 mg by mouth 3 (three) times daily as needed., Disp: , Rfl:     insulin aspart U-100 (NOVOLOG FLEXPEN U-100 INSULIN) 100 unit/mL (3 mL) InPn pen, Inject sq following sliding scale not to " exceed 50 units daily, Disp: 45 mL, Rfl: 3    insulin glargine (LANTUS) 100 unit/mL injection, Inject 45 Units into the skin every evening. BASAGLAR, Disp: , Rfl:     isosorbide mononitrate (IMDUR) 30 MG 24 hr tablet, Take 2 tablets (60 mg total) by mouth once daily., Disp: 180 tablet, Rfl: 3    MAXITROL 3.5mg/mL-10,000 unit/mL-0.1 % DrpS, Place 1 drop into both eyes 4 (four) times daily., Disp: , Rfl:     metoclopramide HCl (REGLAN) 10 MG tablet, Take 10 mg by mouth every 6 (six) hours as needed., Disp: , Rfl:     metoprolol succinate (TOPROL-XL) 25 MG 24 hr tablet, Take 2 tablets (50 mg total) by mouth once daily. (Patient not taking: Reported on 12/16/2022), Disp: 90 tablet, Rfl: 1    nitroGLYCERIN (NITROSTAT) 0.4 MG SL tablet, Place 1 tablet (0.4 mg total) under the tongue every 5 (five) minutes as needed for Chest pain., Disp: 25 tablet, Rfl: 3    ondansetron (ZOFRAN-ODT) 8 MG TbDL, ondansetron 8 mg disintegrating tablet  DISSOLVE ONE TABLET ON TONGUE TWICE DAILY AS NEEDED, Disp: , Rfl:     pantoprazole (PROTONIX) 40 MG tablet, Take 2 tablets (80 mg total) by mouth once daily., Disp: 60 tablet, Rfl: 2    promethazine (PHENERGAN) 25 MG tablet, Take 25 mg by mouth every 4 (four) hours. One tablet every 8 hours prn for nausea or headache, no more than 2 in a day or 3 days a week, no driving when taking this medication., Disp: , Rfl:     ticagrelor (BRILINTA) 90 mg tablet, Take 1 tablet (90 mg total) by mouth 2 (two) times daily., Disp: 180 tablet, Rfl: 3    traMADoL (ULTRAM) 50 mg tablet, Take 1 tablet (50 mg total) by mouth every 12 (twelve) hours as needed for Pain (right ankle or hip pain). (Patient not taking: Reported on 12/16/2022), Disp: 12 tablet, Rfl: 0    UBROGEPANT 100 mg tablet, TAKE ONE TABLET BY MOUTH AT ONSET of migraine, MAY REPEAT DOSE AFTER TWO hours. no more THAN TWO doses in APPLY 24 hour period, Disp: , Rfl:   Meds reviewed by not reconciled. She did not bring her meds.       Review of Systems  "  Respiratory:  Positive for shortness of breath.    Cardiovascular:  Negative for chest pain, palpitations, orthopnea, claudication, leg swelling and PND.   Gastrointestinal:         + dysphagia- "pills are getting stuck"   Neurological:  Negative for dizziness.       Objective:   /62 (BP Location: Left arm, Patient Position: Sitting)   Pulse 79   Ht 5' 6" (1.676 m)   Wt 93 kg (205 lb)   SpO2 97%   BMI 33.09 kg/m²     Physical Exam  Vitals and nursing note reviewed.   Constitutional:       Appearance: Normal appearance. She is obese.   HENT:      Head: Normocephalic and atraumatic.   Neck:      Vascular: No carotid bruit.   Cardiovascular:      Rate and Rhythm: Normal rate and regular rhythm.      Pulses: Normal pulses.      Heart sounds: Normal heart sounds.   Pulmonary:      Effort: Pulmonary effort is normal.      Breath sounds: Normal breath sounds.   Abdominal:      Palpations: Abdomen is soft.   Musculoskeletal:      Cervical back: Neck supple.      Right lower leg: No edema.      Left lower leg: No edema.   Skin:     General: Skin is warm and dry.      Capillary Refill: Capillary refill takes less than 2 seconds.   Neurological:      General: No focal deficit present.      Mental Status: She is alert and oriented to person, place, and time.   Psychiatric:         Mood and Affect: Mood normal.         Assessment:     1. High risk medication use  CANCELED: ALT (SGPT)      2. Type 1 diabetes mellitus with other specified complication  amLODIPine-benazepriL (LOTREL) 5-40 mg per capsule      3. Hyperlipidemia, unspecified hyperlipidemia type        4. Coronary artery disease involving native coronary artery of native heart without angina pectoris              Plan:   CAD (coronary artery disease)  [facility] 11/16/20202  2 vessel CAD with a 50% midLAD stenosis and a  of the RCA with collaterals from the left system  Asymptomatic on current medical management  RTC 6 months or sooner if needed.  She is to " notify her gastroenterologist of her issues with dysphagia.

## 2022-12-19 NOTE — ASSESSMENT & PLAN NOTE
[facility] 11/16/20202  2 vessel CAD with a 50% midLAD stenosis and a  of the RCA with collaterals from the left system  Asymptomatic on current medical management  RTC 6 months or sooner if needed.

## 2023-01-18 RX ORDER — ISOSORBIDE MONONITRATE 30 MG/1
60 TABLET, EXTENDED RELEASE ORAL DAILY
Qty: 30 TABLET | Refills: 0 | Status: SHIPPED | OUTPATIENT
Start: 2023-01-18 | End: 2023-01-19 | Stop reason: SDUPTHER

## 2023-01-19 RX ORDER — ISOSORBIDE MONONITRATE 60 MG/1
60 TABLET, EXTENDED RELEASE ORAL DAILY
COMMUNITY
Start: 2023-01-17 | End: 2023-01-19 | Stop reason: SDUPTHER

## 2023-01-19 RX ORDER — ISOSORBIDE MONONITRATE 30 MG/1
60 TABLET, EXTENDED RELEASE ORAL DAILY
Qty: 180 TABLET | Refills: 1 | Status: SHIPPED | OUTPATIENT
Start: 2023-01-19 | End: 2023-01-19

## 2023-01-19 RX ORDER — ISOSORBIDE MONONITRATE 30 MG/1
60 TABLET, EXTENDED RELEASE ORAL DAILY
Qty: 180 TABLET | Refills: 1 | Status: SHIPPED | OUTPATIENT
Start: 2023-01-19 | End: 2023-01-19 | Stop reason: SDUPTHER

## 2023-01-20 RX ORDER — ISOSORBIDE MONONITRATE 60 MG/1
60 TABLET, EXTENDED RELEASE ORAL DAILY
Qty: 30 TABLET | Refills: 0 | Status: SHIPPED | OUTPATIENT
Start: 2023-01-20 | End: 2023-01-23 | Stop reason: SDUPTHER

## 2023-01-23 RX ORDER — ISOSORBIDE MONONITRATE 60 MG/1
60 TABLET, EXTENDED RELEASE ORAL DAILY
Qty: 90 TABLET | Refills: 1 | Status: SHIPPED | OUTPATIENT
Start: 2023-01-23 | End: 2023-07-12 | Stop reason: SDUPTHER

## 2023-01-25 ENCOUNTER — OFFICE VISIT (OUTPATIENT)
Dept: NEUROLOGY | Facility: CLINIC | Age: 53
End: 2023-01-25
Payer: OTHER GOVERNMENT

## 2023-01-25 ENCOUNTER — TELEPHONE (OUTPATIENT)
Dept: NEUROLOGY | Facility: CLINIC | Age: 53
End: 2023-01-25
Payer: OTHER GOVERNMENT

## 2023-01-25 VITALS
WEIGHT: 202 LBS | RESPIRATION RATE: 18 BRPM | DIASTOLIC BLOOD PRESSURE: 70 MMHG | OXYGEN SATURATION: 99 % | SYSTOLIC BLOOD PRESSURE: 130 MMHG | HEIGHT: 66 IN | BODY MASS INDEX: 32.47 KG/M2 | HEART RATE: 69 BPM

## 2023-01-25 DIAGNOSIS — E03.9 HYPOTHYROIDISM, UNSPECIFIED TYPE: ICD-10-CM

## 2023-01-25 DIAGNOSIS — R11.0 NAUSEA: Primary | ICD-10-CM

## 2023-01-25 DIAGNOSIS — G43.709 CHRONIC MIGRAINE WITHOUT AURA WITHOUT STATUS MIGRAINOSUS, NOT INTRACTABLE: ICD-10-CM

## 2023-01-25 DIAGNOSIS — E53.8 VITAMIN B12 DEFICIENCY: ICD-10-CM

## 2023-01-25 DIAGNOSIS — E55.9 VITAMIN D DEFICIENCY: ICD-10-CM

## 2023-01-25 PROCEDURE — 99215 OFFICE O/P EST HI 40 MIN: CPT | Mod: PBBFAC | Performed by: NURSE PRACTITIONER

## 2023-01-25 PROCEDURE — 99214 OFFICE O/P EST MOD 30 MIN: CPT | Mod: S$PBB,,, | Performed by: NURSE PRACTITIONER

## 2023-01-25 PROCEDURE — 99214 PR OFFICE/OUTPT VISIT, EST, LEVL IV, 30-39 MIN: ICD-10-PCS | Mod: S$PBB,,, | Performed by: NURSE PRACTITIONER

## 2023-01-25 RX ORDER — PROMETHAZINE HYDROCHLORIDE 25 MG/1
TABLET ORAL
Qty: 20 TABLET | Refills: 1 | Status: SHIPPED | OUTPATIENT
Start: 2023-01-25 | End: 2023-08-23

## 2023-01-25 NOTE — TELEPHONE ENCOUNTER
Called pt and gave her the information below from TISHA Clark NP. She V/U.      ----- Message from STEPHANIE Quezada sent at 1/25/2023  1:33 PM CST -----  No significant abnormalities noted on her labs, no changes from her prior year

## 2023-01-25 NOTE — PATIENT INSTRUCTIONS
Continue qulipta 60mg daily  Phenergan as needed, but not more than 2 days of the week  Work with insurance to see about approval for the [facility] to use PRN  Labs as ordered

## 2023-01-25 NOTE — PROGRESS NOTES
"    Subjective:       Patient ID: Talia Lawrence is a 52 y.o. female     Chief Complaint:    Chief Complaint   Patient presents with    Follow-up    Migraine        Allergies:  Lisinopril and Lyrica [pregabalin]    Current Medications:    Outpatient Encounter Medications as of 1/25/2023   Medication Sig Dispense Refill    albuterol (PROVENTIL/VENTOLIN HFA) 90 mcg/actuation inhaler Inhale 2 puffs into the lungs every 6 (six) hours as needed for Wheezing. Rescue 18 g 3    amLODIPine-benazepriL (LOTREL) 5-40 mg per capsule Take 1 capsule by mouth once daily. amlodipine 5 mg-benazepril 40 mg capsule   TAKE ONE CAPSULE BY MOUTH EVERY DAY    amlodipine 5 mg-benazepril 40 mg capsule   TAKE ONE CAPSULE BY MOUTH EVERY DAY 90 capsule 3    atogepant (QULIPTA) 60 mg Tab Take 60 mg by mouth once daily. 90 tablet 3    atorvastatin (LIPITOR) 40 MG tablet Take 1 tablet (40 mg total) by mouth every evening. 90 tablet 3    BD ULTRA-FINE CARLINE PEN NEEDLE 32 gauge x 5/32" Ndle Use to inject insulin 6 times daily. 400 each 3    blood sugar diagnostic (BLOOD GLUCOSE TEST) Strp To check blood sugar 6 times daily 600 each 3    blood-glucose meter (TRUE METRIX GLUCOSE METER MISC) by Misc.(Non-Drug; Combo Route) route. Use as directed      brimonidine 0.2% (ALPHAGAN) 0.2 % Drop Place 1 drop into both eyes 2 (two) times daily.      cholecalciferol, vitamin D3, 125 mcg (5,000 unit) Tab Take 5,000 Units by mouth once daily.      dapagliflozin propanediol (FARXIGA ORAL) Take by mouth.      dorzolamide-timolol 2-0.5% (COSOPT) 22.3-6.8 mg/mL ophthalmic solution instill one drop in affected eye twice a day.      flash glucose scanning reader (FREESTYLE CAR[place]OS 2 READER[place] [facility] [place]. 1 each 0    flash glucose sensor (FREESTYLE CARLOS 2 SENSOR) Kit Apply sensor every 14 days as directed. 6 kit 3    fluticasone propionate (FLONASE) 50 mcg/actuation nasal spray 2 sprays by Each Nostril route once daily.      fluticasone-salmeterol diskus " inhaler 100-50 mcg Inhale 1 puff into the lungs 2 (two) times daily. Controller 180 each 3    glucagon HCL (GLUCAGON, HCL, EMERGENCY KIT) 1 mg SolR Inject as directed. Use as directed for hypoglycemia      insulin aspart U-100 (NOVOLOG FLEXPEN U-100 INSULIN) 100 unit/mL (3 mL) InPn pen Inject sq following sliding scale not to exceed 50 units daily 45 mL 3    insulin glargine (LANTUS) 100 unit/mL injection Inject 45 Units into the skin every evening. BASAGLAR      isosorbide mononitrate (IMDUR) 60 MG 24 hr tablet Take 1 tablet (60 mg total) by mouth once daily. 90 tablet 1    metoprolol succinate (TOPROL-XL) 25 MG 24 hr tablet Take 2 tablets (50 mg total) by mouth once daily. 90 tablet 1    nitroGLYCERIN (NITROSTAT) 0.4 MG SL tablet Place 1 tablet (0.4 mg total) under the tongue every 5 (five) minutes as needed for Chest pain. 25 tablet 3    pantoprazole (PROTONIX) 40 MG tablet Take 2 tablets (80 mg total) by mouth once daily. 60 tablet 2    ticagrelor (BRILINTA) 90 mg tablet Take 1 tablet (90 mg total) by mouth 2 (two) times daily. 180 tablet 3    [DISCONTINUED] promethazine (PHENERGAN) 25 MG tablet Take 25 mg by mouth every 4 (four) hours. One tablet every 8 hours prn for nausea or headache, no more than 2 in a day or 3 days a week, no driving when taking this medication.      hydrOXYzine pamoate (VISTARIL) 25 MG Cap Take 25 mg by mouth 3 (three) times daily as needed.      promethazine (PHENERGAN) 25 MG tablet Take 1 tablet every 8 hours as needed for migraine.  Not more than 3 days of the week 20 tablet 1    [DISCONTINUED] isosorbide mononitrate (IMDUR) 30 MG 24 hr tablet Take 2 tablets (60 mg total) by mouth once daily. 180 tablet 3    [DISCONTINUED] isosorbide mononitrate (IMDUR) 30 MG 24 hr tablet Take 2 tablets (60 mg total) by mouth once daily. 30 tablet 0    [DISCONTINUED] isosorbide mononitrate (IMDUR) 60 MG 24 hr tablet Take 60 mg by mouth once daily.      [DISCONTINUED] isosorbide mononitrate (IMDUR) 60  MG 24 hr tablet Take 1 tablet (60 mg total) by mouth once daily. 30 tablet 0    [DISCONTINUED] MAXITROL 3.5mg/mL-10,000 unit/mL-0.1 % DrpS Place 1 drop into both eyes 4 (four) times daily.      [DISCONTINUED] metoclopramide HCl (REGLAN) 10 MG tablet Take 10 mg by mouth every 6 (six) hours as needed.      [DISCONTINUED] ondansetron (ZOFRAN-ODT) 8 MG TbDL ondansetron 8 mg disintegrating tablet   DISSOLVE ONE TABLET ON TONGUE TWICE DAILY AS NEEDED      [DISCONTINUED] traMADoL (ULTRAM) 50 mg tablet Take 1 tablet (50 mg total) by mouth every 12 (twelve) hours as needed for Pain (right ankle or hip pain). (Patient not taking: Reported on 12/16/2022) 12 tablet 0    [DISCONTINUED] UBROGEPANT 100 mg tablet TAKE ONE TABLET BY MOUTH AT ONSET of migraine, MAY REPEAT DOSE AFTER TWO hours. no more THAN TWO doses in APPLY 24 hour period       No facility-administered encounter medications on file as of 1/25/2023.       History of Present Illness  53 y/o female following in neurology for migraine headaches.    Prior treated with topamax but developed glaucoma and it was stopped.  Already taking metoprolol for HTN, an prior on pamelor without reported improvement.  Was on emgality once monthly injections through headache clinic in [place], felt was working good, but said she was on too many shots already and wanted to change.  Thus I put her on Qulipta 60mg daily.  Now a month later, she is reported about 8 headache days a month, so this is now half what she was reporting last visit.    She has CAD, followed by Dr. Rosado, so she is not able to use any triptan medications.    Prior sleep apnea workup was found negative.         Review of Systems  Review of Systems   Constitutional:  Negative for diaphoresis and fever.   HENT:  Negative for congestion, hearing loss and tinnitus.    Eyes:  Negative for blurred vision, double vision, photophobia, discharge and redness.   Respiratory:  Negative for cough and shortness of breath.     Cardiovascular:  Negative for chest pain.   Gastrointestinal:  Negative for abdominal pain, nausea and vomiting.   Musculoskeletal:  Negative for back pain, joint pain, myalgias and neck pain.   Skin:  Negative for itching and rash.   Neurological:  Positive for headaches. Negative for dizziness, tremors, sensory change, speech change, focal weakness, seizures, loss of consciousness and weakness.   Psychiatric/Behavioral:  Negative for depression, hallucinations and memory loss. The patient does not have insomnia.    All other systems reviewed and are negative.   Objective:     NEUROLOGICAL EXAMINATION:     MENTAL STATUS   Oriented to person, place, and time.   Registration: recalls 3 of 3 objects. Recall at 5 minutes: recalls 3 of 3 objects.   Attention: normal. Concentration: normal.   Speech: speech is normal   Level of consciousness: alert  Knowledge: good and consistent with education.   Normal comprehension.     CRANIAL NERVES     CN II   Visual fields full to confrontation.   Visual acuity: normal  Right visual field deficit: none  Left visual field deficit: none     CN III, IV, VI   Pupils are equal, round, and reactive to light.  Extraocular motions are normal.   Right pupil: Size: 3 mm. Shape: regular. Reactivity: brisk. Consensual response: intact. Accommodation: intact.   Left pupil: Size: 3 mm. Shape: regular. Reactivity: brisk. Consensual response: intact. Accommodation: intact.   CN III: no CN III palsy  CN VI: no CN VI palsy  Nystagmus: none   Diplopia: none  Upgaze: normal  Downgaze: normal  Conjugate gaze: present  Vestibulo-ocular reflex: present    CN V   Facial sensation intact.   Right facial sensation deficit: none  Left facial sensation deficit: none  Right corneal reflex: normal  Left corneal reflex: normal    CN VII   Facial expression full, symmetric.   Right facial weakness: none  Left facial weakness: none  Right taste: normal  Left taste: normal    CN VIII   CN VIII normal.   Hearing:  intact    CN IX, X   CN IX normal.   CN X normal.   Palate: symmetric    CN XI   CN XI normal.   Right sternocleidomastoid strength: normal  Left sternocleidomastoid strength: normal  Right trapezius strength: normal  Left trapezius strength: normal    CN XII   CN XII normal.   Tongue: not atrophic  Fasciculations: absent  Tongue deviation: none    MOTOR EXAM   Muscle bulk: normal  Overall muscle tone: normal  Right arm tone: normal  Left arm tone: normal  Right arm pronator drift: absent  Left arm pronator drift: absent  Right leg tone: normal  Left leg tone: normal    Strength   Right neck flexion: 5/5  Left neck flexion: 5/5  Right neck extension: 5/5  Left neck extension: 5/5  Right deltoid: 5/5  Left deltoid: 5/5  Right biceps: 5/5  Left biceps: 5/5  Right triceps: 5/5  Left triceps: 5/5  Right wrist flexion: 5/5  Left wrist flexion: 5/5  Right wrist extension: 5/5  Left wrist extension: 5/5  Right interossei: 5/5  Left interossei: 5/5  Right iliopsoas: 5/5  Left iliopsoas: 5/5  Right quadriceps: 5/5  Left quadriceps: 5/5  Right hamstrin/5  Left hamstrin/5  Right anterior tibial: 5/5  Left anterior tibial: 5/5  Right posterior tibial: 5/5  Left posterior tibial: 5/5  Right gastroc: 5/5  Left gastroc: 5/5    REFLEXES     Reflexes   Right brachioradialis: 2+  Left brachioradialis: 2+  Right biceps: 2+  Left biceps: 2+  Right triceps: 2+  Left triceps: 2+  Right patellar: 2+  Left patellar: 2+  Right achilles: 2+  Left achilles: 2+  Right plantar: normal  Left plantar: normal  Right Avilez: absent  Left Avielz: absent  Right ankle clonus: absent  Left ankle clonus: absent  Right pendular knee jerk: absent  Left pendular knee jerk: absent    SENSORY EXAM   Light touch normal.   Right arm light touch: normal  Left arm light touch: normal  Right leg light touch: normal  Left leg light touch: normal  Vibration normal.   Right arm vibration: normal  Left arm vibration: normal  Right leg vibration:  normal  Left leg vibration: normal  Proprioception normal.   Right arm proprioception: normal  Left arm proprioception: normal  Right leg proprioception: normal  Left leg proprioception: normal  Pinprick normal.   Right arm pinprick: normal  Left arm pinprick: normal  Right leg pinprick: normal  Left leg pinprick: normal  Graphesthesia: normal  Romberg: negative  Stereognosis: normal    GAIT AND COORDINATION     Gait  Gait: normal     Coordination   Finger to nose coordination: normal  Heel to shin coordination: normal  Tandem walking coordination: normal    Tremor   Resting tremor: absent  Intention tremor: absent  Action tremor: absent     Physical Exam  Vitals and nursing note reviewed.   Constitutional:       Appearance: Normal appearance.   HENT:      Head: Normocephalic.   Eyes:      Extraocular Movements: Extraocular movements intact and EOM normal.      Pupils: Pupils are equal, round, and reactive to light.   Cardiovascular:      Rate and Rhythm: Normal rate and regular rhythm.   Pulmonary:      Effort: Pulmonary effort is normal.      Breath sounds: Normal breath sounds.   Musculoskeletal:         General: No swelling or tenderness. Normal range of motion.      Cervical back: Normal range of motion and neck supple.      Right lower leg: No edema.      Left lower leg: No edema.   Skin:     General: Skin is warm and dry.      Coloration: Skin is not jaundiced.      Findings: No rash.   Neurological:      General: No focal deficit present.      Mental Status: She is alert and oriented to person, place, and time.      GCS: GCS eye subscore is 4. GCS verbal subscore is 5. GCS motor subscore is 6.      Cranial Nerves: No cranial nerve deficit.      Sensory: No sensory deficit.      Motor: Motor function is intact. No weakness.      Coordination: Coordination is intact. Coordination normal. Finger-Nose-Finger Test, Heel to Shin Test and Romberg Test normal.      Gait: Gait is intact. Gait and tandem walk normal.       Deep Tendon Reflexes: Reflexes normal.      Reflex Scores:       Tricep reflexes are 2+ on the right side and 2+ on the left side.       Bicep reflexes are 2+ on the right side and 2+ on the left side.       Brachioradialis reflexes are 2+ on the right side and 2+ on the left side.       Patellar reflexes are 2+ on the right side and 2+ on the left side.       Achilles reflexes are 2+ on the right side and 2+ on the left side.  Psychiatric:         Mood and Affect: Mood normal.         Speech: Speech normal.         Behavior: Behavior normal.        Assessment:     Problem List Items Addressed This Visit          Neuro    Chronic migraine without aura    Relevant Orders    Basic Metabolic Panel    CBC Auto Differential     Other Visit Diagnoses       Nausea    -  Primary    Relevant Medications    promethazine (PHENERGAN) 25 MG tablet    Vitamin D deficiency        Relevant Orders    Vitamin D    Vitamin B12 deficiency        Relevant Orders    Folate    Vitamin B12    Hypothyroidism, unspecified type        Relevant Orders    TSH               Primary Diagnosis and ICD10  Nausea [R11.0]    Plan:     Patient Instructions   Continue qulipta 60mg daily  Phenergan as needed, but not more than 2 days of the week  Work with insurance to see about approval for the [facility] to use PRN  Labs as ordered    Medications Discontinued During This Encounter   Medication Reason    promethazine (PHENERGAN) 25 MG tablet Reorder    traMADoL (ULTRAM) 50 mg tablet     UBROGEPANT 100 mg tablet     ondansetron (ZOFRAN-ODT) 8 MG TbDL     metoclopramide HCl (REGLAN) 10 MG tablet     MAXITROL 3.5mg/mL-10,000 unit/mL-0.1 % DrpS        Requested Prescriptions     Signed Prescriptions Disp Refills    promethazine (PHENERGAN) 25 MG tablet 20 tablet 1     Sig: Take 1 tablet every 8 hours as needed for migraine.  Not more than 3 days of the week       Orders Placed This Encounter   Procedures    Basic Metabolic Panel    CBC Auto Differential     Folate    Vitamin D    Vitamin B12    TSH

## 2023-02-15 ENCOUNTER — TELEPHONE (OUTPATIENT)
Dept: PRIMARY CARE CLINIC | Facility: CLINIC | Age: 53
End: 2023-02-15
Payer: OTHER GOVERNMENT

## 2023-02-21 ENCOUNTER — OFFICE VISIT (OUTPATIENT)
Dept: DIABETES SERVICES | Facility: CLINIC | Age: 53
End: 2023-02-21
Payer: OTHER GOVERNMENT

## 2023-02-21 VITALS — BODY MASS INDEX: 32.6 KG/M2 | HEIGHT: 66 IN

## 2023-02-21 DIAGNOSIS — E11.43 DIABETIC GASTROPARESIS: ICD-10-CM

## 2023-02-21 DIAGNOSIS — I10 PRIMARY HYPERTENSION: ICD-10-CM

## 2023-02-21 DIAGNOSIS — I25.10 CORONARY ARTERY DISEASE INVOLVING NATIVE CORONARY ARTERY OF NATIVE HEART WITHOUT ANGINA PECTORIS: ICD-10-CM

## 2023-02-21 DIAGNOSIS — E78.2 MIXED HYPERLIPIDEMIA: ICD-10-CM

## 2023-02-21 DIAGNOSIS — N18.30 CKD STAGE 3 DUE TO TYPE 1 DIABETES MELLITUS: ICD-10-CM

## 2023-02-21 DIAGNOSIS — E11.3559 STABLE PROLIFERATIVE DIABETIC RETINOPATHY ASSOCIATED WITH TYPE 2 DIABETES MELLITUS, UNSPECIFIED LATERALITY: ICD-10-CM

## 2023-02-21 DIAGNOSIS — K31.84 DIABETIC GASTROPARESIS: ICD-10-CM

## 2023-02-21 DIAGNOSIS — E10.22 CKD STAGE 3 DUE TO TYPE 1 DIABETES MELLITUS: ICD-10-CM

## 2023-02-21 DIAGNOSIS — E10.69 TYPE 1 DIABETES MELLITUS WITH OTHER SPECIFIED COMPLICATION: Primary | ICD-10-CM

## 2023-02-21 LAB
GLUCOSE SERPL-MCNC: 110 MG/DL (ref 70–110)
HBA1C MFR BLD: 8.1 % (ref 4.5–6.6)

## 2023-02-21 PROCEDURE — 99215 OFFICE O/P EST HI 40 MIN: CPT | Mod: S$PBB,,, | Performed by: NURSE PRACTITIONER

## 2023-02-21 PROCEDURE — 99214 OFFICE O/P EST MOD 30 MIN: CPT | Mod: PBBFAC | Performed by: NURSE PRACTITIONER

## 2023-02-21 PROCEDURE — 95251 PR GLUCOSE MONITOR, 72 HOUR, PHYS INTERP: ICD-10-PCS | Mod: ,,, | Performed by: NURSE PRACTITIONER

## 2023-02-21 PROCEDURE — 99215 PR OFFICE/OUTPT VISIT, EST, LEVL V, 40-54 MIN: ICD-10-PCS | Mod: S$PBB,,, | Performed by: NURSE PRACTITIONER

## 2023-02-21 PROCEDURE — 95251 CONT GLUC MNTR ANALYSIS I&R: CPT | Mod: ,,, | Performed by: NURSE PRACTITIONER

## 2023-02-21 PROCEDURE — 83036 HEMOGLOBIN GLYCOSYLATED A1C: CPT | Mod: PBBFAC | Performed by: NURSE PRACTITIONER

## 2023-02-21 PROCEDURE — 82962 GLUCOSE BLOOD TEST: CPT | Mod: PBBFAC | Performed by: NURSE PRACTITIONER

## 2023-02-21 RX ORDER — INSULIN GLARGINE 300 U/ML
45 INJECTION, SOLUTION SUBCUTANEOUS DAILY
Qty: 10 PEN | Refills: 1 | Status: SHIPPED | OUTPATIENT
Start: 2023-02-21 | End: 2023-06-13 | Stop reason: SDUPTHER

## 2023-02-21 RX ORDER — INSULIN GLARGINE 300 U/ML
45 INJECTION, SOLUTION SUBCUTANEOUS DAILY
Qty: 10 PEN | Refills: 1 | Status: SHIPPED | OUTPATIENT
Start: 2023-02-21 | End: 2023-02-21

## 2023-02-21 NOTE — PATIENT INSTRUCTIONS
Low Carbohydrate snacks/quick meals    Ham and cheese rollups - slice of ham wrapped around a string cheese/cheese slice. (0 gm carb)  Cucumber boats (stuffed with chicken salad or tuna salad - no fruit or sweet pickle in salad) (0 gm carb)  Celery and Peanut Butter (2 stalks with 1 Tbsp PNB = 6 gm carb)  Nuts - mixed (1/4 cup = 6 gm carb)  Sunflower seeds- without shell (1/4 cup = 7 gm carb) In the shell (1 cup = 3.3 gm carb)  Deviled eggs (no sweet pickles) - (0 gm carb)  Hard boiled eggs - (0 gm carb)  Guacamole with raw vegetables (mashed avocado with lime juice and seasoning to taste) (1 cup raw veg = 5 gm carb)  Ranch dressing with raw vegetables -(2 Tbsp Ranch = 2 gm carb, ½ cup raw veggies = 2.5 gm carb  Lasagna rolls- Slice zucchini thinly lengthwise and microwave for 1-2 minutes. Fill with ricotta, parmesan cheese. Roll and top with low carb tomato sauce. (5 rolls = 5 gm carb)  Crust less pizza -pepperoni or Swazi lowe topped with cheese and veggies +1 tbsp tomato sauce, microwave (1 gm carb)  Beef jerky - read label for lower carb jerky  Olives - Black (5 olives = 1 gm carb) Green (5 olives = 1 gm carb)  Dill pickles (1 whole dill pickle = 2 gm carb)  Sugar free jell-o (0 gm carb)  [place] Chicken Marinate chicken strips in 2 Tbsp sugar free maple syrup, 1 Tbsp lime juice, 1 Tbsp fresh cilantro. [place] or cook in skillet sprayed with oil. (0 gm carb)  Chicken nachos - Heat oven to 350. Rub 5-6 chicken tenders with 1 Tbsp Jesus Alberto Taco seasoning then drizzle with vegetable oil. Bake at 350 for 3-4 min and then flip and cook 3-4 min. Top with grated cheese, jalopenos, lowe, green onion. Place back in oven at temp of 425 for 3-4 minutes. Serve with side of 2 Tbsp ranch dressing or sour cream. (3 gm carb)  Egg Mcmuffin: using egg (or egg white for a healthier version) as the bread put one slice of cheese with 1 slice Swazi lowe or sausage jon (1 gm carb per sandwich)  Southern Okra - Wash and dry  fresh okra. Toss with olive oil, salt and pepper and roast in oven 10-20 minutes. (1 cup = 5 gm carb)  Crispy green beans - Jaz 5 lbs fresh green beans. Toss with 1/3 cup melted coconut oil and sprinkle with 4 tsp salt and 1 tsp onion and garlic powder. Bake at 170-175 for 8 hours or dehydrate overnight in . (1 cup = 5 gm carb)  Salt and vinegar zucchini chips - Thinly slice zucchini as thin as possible. In small bowl whisk 2 Tbsp olive oil, 2 Tbsp white vinegar, and 2 tsp sea salt. Add zucchini and dehydrate or bake on 170 for 3-4 hours till crispy. (½ cup = 3 gm carb). Make in large batches and keep in air tight container up to 1 week   Zucchini Jesus Alberto chips - Thinly slice zucchini as thin as possible. Heat oil in fryer to 350 and drop zucchini in hot oil working in batches of about 20 chips at a time. Remove, drain and sprinkle with Jesus Alberto Taco seasoning. (1/2 cup = 3 gm carb). Make in large batches and keep in air tight container up to 1 week.  Jesus Alberto Taco Seasoning - 2 Tbsp chili powder, ½ tsp garlic powder, ½ tsp onion powder, ½ tsp crushed red pepper flakes, ½ tsp dried oregano, 3 tsp ground cumin, 2 tsp sea salt, 2 tsp black pepper. Makes 20 servings (0 gm carb)  [place] milk (1 cup almond milk = 0.3 gm carb)  Cheese chips - Preheat oven 400. On a nonstick baking sheet add cheese slices (Cheddar, Swiss, Provolone, Liban Rene), bake 10-12 minutes or until browned. Cool completely before removal. (2 slices = 1 gm carb). Store in air tight container up to 1 week.   Breakfast balls - mix together 2 lbs pork sausage, 1 lb ground beef, 3 eggs, 2 Tbsp dried onion flakes, ½ tsp black pepper, ½ lb sharp cheddar cheese, shredded. Form into 4 dozen 1 balls. Bake on cookie sheet for 25 min at 375. Cool and may be frozen as well individually. (0 gm carb)  Meat muffins - spray muffin tin with cooking spray. Line muffin tin with 1 slice ham. Add 1 raw egg. Top with grated cheese and salt and pepper. Bake 10-12  min. (0 gm carb)  Pork rinds/Cracklings (0 gm carb)  Gummy Bears - Add 1 packet of Sugar free jello (flavor of your choice), 1 packet unflavored gelatin and 1/3 cup cold water to small sauce pan. Stir well and heat over low till it become liquid and dissolved (2-3 minutes). Pour into mold and refrigerate 30-40 minutes. Add only ¼ cup if you want them more firm. (0 gm carb)  Cheese and meat kabobs (0 gm carb)  Garlic parmesan cheese crisps - Preheat oven to 350. On a nonstick baking sheet, place 1 Tbsp grated parmesan cheese, sprinkle with garlic and basil. Bake about 5 minutes or until outside edges become devries brown. Cool completely before removal (5 crisps = 1 gm carb)  Antipasti - Pepperoni, salami, green pepper, jalapeno pepper, mushrooms, onion, black olives, cheddar and provolone cheese cubes. Toss with Italian salad dressing. (1/2 cup = 5 gm carb)  [place] chicken wings - (0 gm carb)  Cheetos- preheat oven to 300. Chop ½ cup freshly shredded cheddar cheese that is frozen and place in  or  and chop into tiny pieces. In a mixing bowl, whip 3 egg whites and 1/8 tsp cream of tartar until VERY stiff peaks form. Sprinkle cheese on top of egg whites and then fold cheese into egg whites very carefully. Place mixture into large ziplock bag and cut hole in corner. Gently squeeze onto greased nonstick cookie pan in cheestos like shapes. Sprinkle with parmesan cheese. Bake for 30 minutes. Turn over off and leave in there for another 30 minutes. (1 cup = 1 gm carb)  Cheesy cauliflower tots - preheat oven to 400. Spray mini muffin tin with nonstick spray. Chop ½ large head of cauliflower into small pieces. Place in microwavable bowl and cover. Microwave 2 minutes. Drain cauliflower. Place in  and pulse till finely chopped. To this add, 1/3 cup grated sharp cheddar, ¼ cup grated parmesan cheese, 2 Tbsp. almond flour, ¼ tsp salt, ½ tsp all purpose seasoning and 1 egg. Mix well. Place 1  Tbsp of mixture in each mini muffin tin. Bake 15 minutes. Remove and flip, bake another 15 minutes. Makes 24 tots. (10 tots = 5 gm carb)  [employer] protein bars (carbs vary)  Rene snacks - Preheat oven 350. 1 cup shredded Pepperjack paper. Scoop 1 Tbsp cheese. Place on non stick pan and press slightly flat. Top with 1 slice jalapeno pepper. Bake for 10-12 minutes till brown and crispy. Cool completely before removal. (10 crisps = 1 gm carb)  Snake bite (aka jalapeno poppers) - remove seeds and membrane from jalapenos, fill with cream cheese, wrap in lowe. Stick a toothpick through to hold lowe in place. Bake 15-20 min at 400 (4 bites = 2 gm carb)  5 minute PNB mousse - Beat together 4 oz softened cream cheese, 2 Tbsp natural PNB (no sugar added), ½ tsp vanilla extract and 1/3 cup Splenda. Fold in 1 cup Reddiwhip. Place in container of your choice and refrigerate up to 1 week. Top your serving with 1Tbsp Sugar free chocolate syrup. (1/2 cup = 4 gm carb)  BLT - Fill a leaf of maya lettuce leaf with 1 Tbsp LF luke, 2 slices lowe, sliced tomato. Sprinkle salt and pepper. (0 gm carb)  Cinnamon and coconut bombs - in microwave safe bowl, mix 1 cup coconut butter, 1 cup coconut milk (full fat canned, not from box), 1 tsp vanilla extract, ½ tsp nutmeg and cinnamon, 1 tsp stevia powder extract. Melt until combined. Place bowl in fridge until hard enough to roll into 10-12 balls, about 30 minutes. Roll balls in 1 cup shredded coconut. Store in refrigerator (1 ball = 1 gm carb)

## 2023-02-21 NOTE — PROGRESS NOTES
Subjective:       Patient ID: Talia Lawrence is a 52 y.o. female.    Chief Complaint: General Diabetes Follow-up (Here for fu and A1C has katharina key 2  was in hospital in nov due to low glucose  )    Here today for routine evaluation and med refill.  Last visit was in early November of last year and a1c at that time was 8.1.  In the middle of November she had two episodes of hypoglycemia that caused her to go to the ER.  Was inpatient for a few days.  I have reviewed this note.  Had LHC on 11/16/20202 that revealed 2 vessel CAD with a 50% midLAD stenosis and a  of the RCA with collaterals from the left system while inpatient as it was previously scheduled after being referred by our office to cardiology. Patient had complained of some mild RAMIREZ and chest pressure intermittently as well as fatigue during visit in early November.      Upon admission basal insulin was stopped and she was not given basal insulin during her entire stay in the hospital or at discharge.  All glucose readings with the exception of the hypoglycemic readings in the ER were 200-520s.  She was taking 110 units total basal insulin in 2 equally divided doses as well as using a CR of 1:6 and CF of 20 with target of 130 prior to hospitalization and had not had hypoglycemia in the past.     The hospitalist, Dr Marcos, referred her to endocrinology in [place] and she was restarted on basal insulin and CR was changed to 1:8 and CF 25 with target of 110.   Upon review of yesi download her fasting readings are well controlled for the most part on 55 units once daily with occasional hypoglycemia in the early morning hours.  Will decrease to 48-50 daily.  Her lunch and supper pp readings are very elevated.  She does admit that at times she waits until after she eats to give insulin.  I suggest that she take it pre-meal.   Pt reports that since discharge her blood pressure has been very well controlled and even low at times.  She reports  that endocrinology wanted to stop the ACE portion of her lotrel that we started her on but she refused stating that the nephrologist wants her to stay on that medication.     Hemoglobin A1C       Date                     Value               Ref Range           Status                02/21/2023               8.1 (A)             4.5 - 6.6 %         Final                 11/02/2022               7.5 (A)             4.5 - 6.6 %         Final                 08/02/2022               8.1 (A)             4.5 - 6.6 %         Final            ----------  Lab Results       Component                Value               Date                       MICROALBUR               1.3                 04/28/2022            Lab Results       Component                Value               Date                       CHOL                     172                 04/28/2022                 CHOL                     170                 04/22/2021                 CHOL                     155                 10/22/2020            Lab Results       Component                Value               Date                       HDL                      42                  04/28/2022                 HDL                      43                  04/22/2021                 HDL                      53                  10/22/2020            Lab Results       Component                Value               Date                       LDLCALC                  66                  04/28/2022                 LDLCALC                  96                  04/22/2021                 LDLCALC                  85                  10/22/2020            Lab Results       Component                Value               Date                       TRIG                     321 (H)             04/28/2022                 TRIG                     157 (H)             04/22/2021                 TRIG                     83                  10/22/2020            Lab Results       Component                 Value               Date                       CHOLHDL                  4.1                 04/28/2022                 CHOLHDL                  4.0                 04/22/2021                 CHOLHDL                  2.9                 10/22/2020            CMP  Sodium       Date                     Value               Ref Range           Status                01/25/2023               134 (L)             136 - 145 mmol*     Final            ----------  Potassium       Date                     Value               Ref Range           Status                01/25/2023               4.7                 3.5 - 5.1 mmol*     Final            ----------  Chloride       Date                     Value               Ref Range           Status                01/25/2023               100                 98 - 107 mmol/L     Final            ----------  CO2       Date                     Value               Ref Range           Status                01/25/2023               31                  21 - 32 mmol/L      Final            ----------  Glucose       Date                     Value               Ref Range           Status                01/25/2023               196 (H)             74 - 106 mg/dL      Final            ----------  BUN       Date                     Value               Ref Range           Status                01/25/2023               24 (H)              7 - 18 mg/dL        Final            ----------  Creatinine       Date                     Value               Ref Range           Status                01/25/2023               1.43 (H)            0.55 - 1.02 mg*     Final            ----------  Calcium       Date                     Value               Ref Range           Status                01/25/2023               9.8                 8.5 - 10.1 mg/*     Final            ----------  Total Protein       Date                     Value               Ref Range           Status                11/13/2022                7.0                 6.4 - 8.2 g/dL      Final            ----------  Albumin       Date                     Value               Ref Range           Status                11/13/2022               3.5                 3.5 - 5.0 g/dL      Final            ----------  Bilirubin, Total       Date                     Value               Ref Range           Status                11/13/2022               0.5                 >0.0 - 1.2 mg/*     Final            ----------  Alk Phos       Date                     Value               Ref Range           Status                11/13/2022               155 (H)             41 - 108 U/L        Final            ----------  AST       Date                     Value               Ref Range           Status                11/13/2022               12 (L)              15 - 37 U/L         Final            ----------  ALT       Date                     Value               Ref Range           Status                11/13/2022               16                  13 - 56 U/L         Final            ----------  Anion Gap       Date                     Value               Ref Range           Status                01/25/2023               8                   7 - 16 mmol/L       Final            ----------  eGFR       Date                     Value               Ref Range           Status                01/25/2023               44 (L)              >=60 mL/min/1.*     Final            ----------      Review of Systems   Constitutional:  Negative for activity change, appetite change, diaphoresis and fatigue.   HENT:  Negative for nasal congestion, facial swelling and sinus pressure/congestion.    Eyes:  Negative for visual disturbance.   Respiratory:  Negative for shortness of breath and wheezing.    Cardiovascular:  Negative for chest pain and leg swelling.   Gastrointestinal:  Negative for constipation, diarrhea, nausea and vomiting.   Endocrine: Negative for polydipsia, polyphagia and  polyuria.   Genitourinary:  Negative for dysuria, frequency and urgency.   Musculoskeletal:  Negative for gait problem and myalgias.   Integumentary:  Negative for color change, rash and wound.   Neurological:  Negative for dizziness, syncope, weakness, headaches, coordination difficulties and coordination difficulties.   Hematological:  Does not bruise/bleed easily.   Psychiatric/Behavioral:  Negative for self-injury, sleep disturbance and suicidal ideas. The patient is not nervous/anxious.        Objective:      Physical Exam  Vitals and nursing note reviewed.   Constitutional:       Appearance: Normal appearance.   HENT:      Head: Normocephalic.   Neck:      Thyroid: No thyromegaly.      Vascular: No carotid bruit.   Cardiovascular:      Rate and Rhythm: Normal rate and regular rhythm.      Pulses:           Dorsalis pedis pulses are 2+ on the right side and 2+ on the left side.        Posterior tibial pulses are 2+ on the right side and 2+ on the left side.      Heart sounds: Normal heart sounds.   Pulmonary:      Effort: Pulmonary effort is normal.      Breath sounds: Normal breath sounds.   Musculoskeletal:         General: Normal range of motion.      Right foot: Normal range of motion.      Left foot: Normal range of motion.   Feet:      Right foot:      Protective Sensation: 6 sites tested.  6 sites sensed.      Skin integrity: Skin integrity normal.      Left foot:      Protective Sensation: 6 sites tested.  6 sites sensed.      Skin integrity: Skin integrity normal.   Skin:     General: Skin is warm and dry.   Neurological:      General: No focal deficit present.      Mental Status: She is alert and oriented to person, place, and time.   Psychiatric:         Mood and Affect: Mood normal.         Behavior: Behavior normal.         Thought Content: Thought content normal.         Judgment: Judgment normal.       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 Type 1 diabetes mellitus with other specified complication  Continue with toujeo but decrease to 48-50 units daily, can continue with what endocrinology suggested with carb ration of 1:8 which is less than what we suggested at last visit of 1:6, and correction of 1:20 with target of 110.   Call for any hypoglycemia.  Change to yesi 3 per pt request. Keep alarms on to prevent hypoglycemia  Keep glucagon pen with you at all times.     -     Hemoglobin A1C, POCT  -     POCT Glucose, Hand-Held Device    2. Primary hypertension  Continue lotrel for ACE coverage,    3. Mixed hyperlipidemia  lipitor    4. Diabetic gastroparesis  Gastroparesis diet    5. Stable proliferative diabetic retinopathy associated with type 2 diabetes mellitus, unspecified laterality      6. Coronary artery disease involving native coronary artery of native heart without angina pectoris      7. 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  Dispense: 10 pen; Refill: 1

## 2023-02-24 DIAGNOSIS — G43.709 CHRONIC MIGRAINE WITHOUT AURA WITHOUT STATUS MIGRAINOSUS, NOT INTRACTABLE: Primary | ICD-10-CM

## 2023-02-24 DIAGNOSIS — G43.709 CHRONIC MIGRAINE WITHOUT AURA WITHOUT STATUS MIGRAINOSUS, NOT INTRACTABLE: ICD-10-CM

## 2023-02-24 RX ORDER — RIMEGEPANT SULFATE 75 MG/75MG
75 TABLET, ORALLY DISINTEGRATING ORAL ONCE AS NEEDED
Qty: 16 TABLET | Refills: 2 | Status: SHIPPED | OUTPATIENT
Start: 2023-02-24 | End: 2023-02-24 | Stop reason: SDUPTHER

## 2023-02-24 RX ORDER — RIMEGEPANT SULFATE 75 MG/75MG
75 TABLET, ORALLY DISINTEGRATING ORAL ONCE AS NEEDED
Qty: 16 TABLET | Refills: 2 | Status: SHIPPED | OUTPATIENT
Start: 2023-02-24 | End: 2023-02-24

## 2023-03-01 DIAGNOSIS — E10.69 TYPE 1 DIABETES MELLITUS WITH OTHER SPECIFIED COMPLICATION: ICD-10-CM

## 2023-03-28 ENCOUNTER — OFFICE VISIT (OUTPATIENT)
Dept: NEUROLOGY | Facility: CLINIC | Age: 53
End: 2023-03-28
Payer: OTHER GOVERNMENT

## 2023-03-28 VITALS
BODY MASS INDEX: 29.57 KG/M2 | HEIGHT: 66 IN | WEIGHT: 184 LBS | DIASTOLIC BLOOD PRESSURE: 84 MMHG | SYSTOLIC BLOOD PRESSURE: 144 MMHG | RESPIRATION RATE: 18 BRPM | HEART RATE: 72 BPM | OXYGEN SATURATION: 98 %

## 2023-03-28 DIAGNOSIS — G43.709 CHRONIC MIGRAINE WITHOUT AURA WITHOUT STATUS MIGRAINOSUS, NOT INTRACTABLE: Primary | ICD-10-CM

## 2023-03-28 PROCEDURE — 99212 OFFICE O/P EST SF 10 MIN: CPT | Mod: S$PBB,,, | Performed by: NURSE PRACTITIONER

## 2023-03-28 PROCEDURE — 99212 PR OFFICE/OUTPT VISIT, EST, LEVL II, 10-19 MIN: ICD-10-PCS | Mod: S$PBB,,, | Performed by: NURSE PRACTITIONER

## 2023-03-28 PROCEDURE — 99215 OFFICE O/P EST HI 40 MIN: CPT | Mod: PBBFAC | Performed by: NURSE PRACTITIONER

## 2023-03-28 NOTE — PROGRESS NOTES
"    Subjective:       Patient ID: Talia Lawrence is a 52 y.o. female     Chief Complaint:    Chief Complaint   Patient presents with    Follow-up    Migraine        Allergies:  Lisinopril and Lyrica [pregabalin]    Current Medications:    Outpatient Encounter Medications as of 3/28/2023   Medication Sig Dispense Refill    albuterol (PROVENTIL/VENTOLIN HFA) 90 mcg/actuation inhaler Inhale 2 puffs into the lungs every 6 (six) hours as needed for Wheezing. Rescue 18 g 3    amLODIPine-benazepriL (LOTREL) 5-40 mg per capsule Take 1 capsule by mouth once daily. amlodipine 5 mg-benazepril 40 mg capsule   TAKE ONE CAPSULE BY MOUTH EVERY DAY    amlodipine 5 mg-benazepril 40 mg capsule   TAKE ONE CAPSULE BY MOUTH EVERY DAY 90 capsule 3    atogepant (QULIPTA) 60 mg Tab Take 60 mg by mouth once daily. 90 tablet 3    atorvastatin (LIPITOR) 40 MG tablet Take 1 tablet (40 mg total) by mouth every evening. 90 tablet 3    BD ULTRA-FINE CARLINE PEN NEEDLE 32 gauge x 5/32" Ndle Use to inject insulin 6 times daily. 400 each 3    blood sugar diagnostic (BLOOD GLUCOSE TEST) Strp To check blood sugar 6 times daily 600 each 3    blood-glucose meter (TRUE METRIX GLUCOSE METER MISC) by Misc.(Non-Drug; Combo Route) route. Use as directed      blood-glucose sensor (DEXCOM G5-G4 SENSOR) Deborah Apply new sensor every 14 days as directed to monitor glucose. This is yesi 3 NOT dexcom 6 each 3    brimonidine 0.2% (ALPHAGAN) 0.2 % Drop Place 1 drop into both eyes 2 (two) times daily.      cholecalciferol, vitamin D3, 125 mcg (5,000 unit) Tab Take 5,000 Units by mouth once daily.      dapagliflozin propanediol (FARXIGA ORAL) Take by mouth.      dorzolamide-timolol 2-0.5% (COSOPT) 22.3-6.8 mg/mL ophthalmic solution instill one drop in affected eye twice a day.      flash glucose scanning reader (FREESTYLE YESI 2 READER) [facility] Use as directed. 1 each 0    flash glucose sensor (FREESTYLE YESI 2 SENSOR) Kit Apply sensor every 14 days as directed. 6 " kit 3    fluticasone propionate (FLONASE) 50 mcg/actuation nasal spray 2 sprays by Each Nostril route once daily.      fluticasone-salmeterol diskus inhaler 100-50 mcg Inhale 1 puff into the lungs 2 (two) times daily. Controller 180 each 3    glucagon HCL (GLUCAGON, HCL, EMERGENCY KIT) 1 mg SolR Inject as directed. Use as directed for hypoglycemia      insulin aspart U-100 (NOVOLOG FLEXPEN U-100 INSULIN) 100 unit/mL (3 mL) InPn pen Inject sq following sliding scale not to exceed 50 units daily 45 mL 3    insulin glargine, TOUJEO, (TOUJEO SOLOSTAR U-300 INSULIN) 300 unit/mL (1.5 mL) InPn pen Inject 45 Units into the skin once daily. 10 pen 1    isosorbide mononitrate (IMDUR) 60 MG 24 hr tablet Take 1 tablet (60 mg total) by mouth once daily. 90 tablet 1    metoprolol succinate (TOPROL-XL) 25 MG 24 hr tablet Take 2 tablets (50 mg total) by mouth once daily. (Patient taking differently: Take 25 mg by mouth once daily.) 90 tablet 1    nitroGLYCERIN (NITROSTAT) 0.4 MG SL tablet Place 1 tablet (0.4 mg total) under the tongue every 5 (five) minutes as needed for Chest pain. 25 tablet 3    pantoprazole (PROTONIX) 40 MG tablet Take 2 tablets (80 mg total) by mouth once daily. 60 tablet 2    promethazine (PHENERGAN) 25 MG tablet Take 1 tablet every 8 hours as needed for migraine.  Not more than 3 days of the week 20 tablet 1    ticagrelor (BRILINTA) 90 mg tablet Take 1 tablet (90 mg total) by mouth 2 (two) times daily. 180 tablet 3     No facility-administered encounter medications on file as of 3/28/2023.       History of Present Illness  51 y/o female following in neurology for migraine headaches.    Prior treated with topamax but developed glaucoma and it was stopped.  Already taking metoprolol for HTN, an prior on pamelor without reported improvement.  Was on emgality once monthly injections through headache clinic in [place], felt was working good, but said she was on too many shots already and wanted to change.  Thus I  put her on Qulipta 60mg daily.  At her followup she reported about 8 headache days a month, which was half of what she was reporting at her initial visit with us.  [facility] so I sent [facility] for [facility] last visit as well.    She has CAD, followed by Dr. Rosado, so she is not able to use any triptan medications.    Prior sleep apnea workup was found negative.         Review of Systems  Review of Systems   Constitutional:  Negative for diaphoresis and fever.   HENT:  Negative for congestion, hearing loss and tinnitus.    Eyes:  Negative for blurred vision, double vision, photophobia, discharge and redness.   Respiratory:  Negative for cough and shortness of breath.    Cardiovascular:  Negative for chest pain.   Gastrointestinal:  Negative for abdominal pain, nausea and vomiting.   Musculoskeletal:  Negative for back pain, joint pain, myalgias and neck pain.   Skin:  Negative for itching and rash.   Neurological:  Positive for headaches. Negative for dizziness, tremors, sensory change, speech change, focal weakness, seizures, loss of consciousness and weakness.   Psychiatric/Behavioral:  Negative for depression, hallucinations and memory loss. The patient does not have insomnia.    All other systems reviewed and are negative.   Objective:     NEUROLOGICAL EXAMINATION:     MENTAL STATUS   Oriented to person, place, and time.   Registration: recalls 3 of 3 objects. Recall at 5 minutes: recalls 3 of 3 objects.   Attention: normal. Concentration: normal.   Speech: speech is normal   Level of consciousness: alert  Knowledge: good and consistent with education.   Normal comprehension.     CRANIAL NERVES     CN II   Visual fields full to confrontation.   Visual acuity: normal  Right visual field deficit: none  Left visual field deficit: none     CN III, IV, VI   Pupils are equal, round, and reactive to light.  Extraocular motions are normal.   Right pupil: Size: 3 mm. Shape: regular. Reactivity: brisk.  Consensual response: intact. Accommodation: intact.   Left pupil: Size: 3 mm. Shape: regular. Reactivity: brisk. Consensual response: intact. Accommodation: intact.   CN III: no CN III palsy  CN VI: no CN VI palsy  Nystagmus: none   Diplopia: none  Upgaze: normal  Downgaze: normal  Conjugate gaze: present  Vestibulo-ocular reflex: present    CN V   Facial sensation intact.   Right facial sensation deficit: none  Left facial sensation deficit: none  Right corneal reflex: normal  Left corneal reflex: normal    CN VII   Facial expression full, symmetric.   Right facial weakness: none  Left facial weakness: none  Right taste: normal  Left taste: normal    CN VIII   CN VIII normal.   Hearing: intact    CN IX, X   CN IX normal.   CN X normal.   Palate: symmetric    CN XI   CN XI normal.   Right sternocleidomastoid strength: normal  Left sternocleidomastoid strength: normal  Right trapezius strength: normal  Left trapezius strength: normal    CN XII   CN XII normal.   Tongue: not atrophic  Fasciculations: absent  Tongue deviation: none    MOTOR EXAM   Muscle bulk: normal  Overall muscle tone: normal  Right arm tone: normal  Left arm tone: normal  Right arm pronator drift: absent  Left arm pronator drift: absent  Right leg tone: normal  Left leg tone: normal    Strength   Right neck flexion: 5/5  Left neck flexion: 5/5  Right neck extension: 5/5  Left neck extension: 5/5  Right deltoid: 5/5  Left deltoid: 5/5  Right biceps: 5/5  Left biceps: 5/5  Right triceps: 5/5  Left triceps: 5/5  Right wrist flexion: 5/5  Left wrist flexion: 5/5  Right wrist extension: 5/5  Left wrist extension: 5/5  Right interossei: 5/5  Left interossei: 5/5  Right iliopsoas: 5/5  Left iliopsoas: 5/5  Right quadriceps: 5/5  Left quadriceps: 5/5  Right hamstrin/5  Left hamstrin/5  Right anterior tibial: 5/5  Left anterior tibial: 5/5  Right posterior tibial: 5/5  Left posterior tibial: 5/5  Right gastroc: 5/5  Left gastroc: 5/5    REFLEXES      Reflexes   Right brachioradialis: 2+  Left brachioradialis: 2+  Right biceps: 2+  Left biceps: 2+  Right triceps: 2+  Left triceps: 2+  Right patellar: 2+  Left patellar: 2+  Right achilles: 2+  Left achilles: 2+  Right plantar: normal  Left plantar: normal  Right Avilez: absent  Left Avilez: absent  Right ankle clonus: absent  Left ankle clonus: absent  Right pendular knee jerk: absent  Left pendular knee jerk: absent    SENSORY EXAM   Light touch normal.   Right arm light touch: normal  Left arm light touch: normal  Right leg light touch: normal  Left leg light touch: normal  Vibration normal.   Right arm vibration: normal  Left arm vibration: normal  Right leg vibration: normal  Left leg vibration: normal  Proprioception normal.   Right arm proprioception: normal  Left arm proprioception: normal  Right leg proprioception: normal  Left leg proprioception: normal  Pinprick normal.   Right arm pinprick: normal  Left arm pinprick: normal  Right leg pinprick: normal  Left leg pinprick: normal  Graphesthesia: normal  Romberg: negative  Stereognosis: normal    GAIT AND COORDINATION     Gait  Gait: normal     Coordination   Finger to nose coordination: normal  Heel to shin coordination: normal  Tandem walking coordination: normal    Tremor   Resting tremor: absent  Intention tremor: absent  Action tremor: absent     Physical Exam  Vitals and nursing note reviewed.   Constitutional:       Appearance: Normal appearance.   HENT:      Head: Normocephalic.   Eyes:      Extraocular Movements: Extraocular movements intact and EOM normal.      Pupils: Pupils are equal, round, and reactive to light.   Cardiovascular:      Rate and Rhythm: Normal rate and regular rhythm.   Pulmonary:      Effort: Pulmonary effort is normal.      Breath sounds: Normal breath sounds.   Musculoskeletal:         General: No swelling or tenderness. Normal range of motion.      Cervical back: Normal range of motion and neck supple.      Right  lower leg: No edema.      Left lower leg: No edema.   Skin:     General: Skin is warm and dry.      Coloration: Skin is not jaundiced.      Findings: No rash.   Neurological:      General: No focal deficit present.      Mental Status: She is alert and oriented to person, place, and time.      GCS: GCS eye subscore is 4. GCS verbal subscore is 5. GCS motor subscore is 6.      Cranial Nerves: No cranial nerve deficit.      Sensory: No sensory deficit.      Motor: Motor function is intact. No weakness.      Coordination: Coordination is intact. Coordination normal. Finger-Nose-Finger Test, Heel to Shin Test and Romberg Test normal.      Gait: Gait is intact. Gait and tandem walk normal.      Deep Tendon Reflexes: Reflexes normal.      Reflex Scores:       Tricep reflexes are 2+ on the right side and 2+ on the left side.       Bicep reflexes are 2+ on the right side and 2+ on the left side.       Brachioradialis reflexes are 2+ on the right side and 2+ on the left side.       Patellar reflexes are 2+ on the right side and 2+ on the left side.       Achilles reflexes are 2+ on the right side and 2+ on the left side.  Psychiatric:         Mood and Affect: Mood normal.         Speech: Speech normal.         Behavior: Behavior normal.        Assessment:     Problem List Items Addressed This Visit          Neuro    Chronic migraine without aura - Primary            Primary Diagnosis and ICD10  Chronic migraine without aura without status migrainosus, not intractable [G43.709]    Plan:     Patient Instructions   Continue qulipta 60mg daily  Phenergan as needed, but not more than 2 days of the week  Continue nurtec as directed as needed    There are no discontinued medications.      Requested Prescriptions      No prescriptions requested or ordered in this encounter       No orders of the defined types were placed in this encounter.

## 2023-03-28 NOTE — PATIENT INSTRUCTIONS
Continue qulipta 60mg daily  Phenergan as needed, but not more than 2 days of the week  Continue nurtec as directed as needed   Minocycline Counseling: Patient advised regarding possible photosensitivity and discoloration of the teeth, skin, lips, tongue and gums.  Patient instructed to avoid sunlight, if possible.  When exposed to sunlight, patients should wear protective clothing, sunglasses, and sunscreen.  The patient was instructed to call the office immediately if the following severe adverse effects occur:  hearing changes, easy bruising/bleeding, severe headache, or vision changes.  The patient verbalized understanding of the proper use and possible adverse effects of minocycline.  All of the patient's questions and concerns were addressed.

## 2023-04-14 ENCOUNTER — OFFICE VISIT (OUTPATIENT)
Dept: FAMILY MEDICINE | Facility: CLINIC | Age: 53
End: 2023-04-14
Payer: OTHER GOVERNMENT

## 2023-04-14 VITALS
TEMPERATURE: 99 F | WEIGHT: 187 LBS | BODY MASS INDEX: 30.05 KG/M2 | RESPIRATION RATE: 18 BRPM | DIASTOLIC BLOOD PRESSURE: 69 MMHG | OXYGEN SATURATION: 100 % | HEIGHT: 66 IN | SYSTOLIC BLOOD PRESSURE: 127 MMHG | HEART RATE: 76 BPM

## 2023-04-14 DIAGNOSIS — M79.671 RIGHT FOOT PAIN: ICD-10-CM
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
## 2023-04-14 DIAGNOSIS — R07.81 RIB PAIN ON RIGHT SIDE: Primary | ICD-10-CM

## 2023-04-14 PROCEDURE — 99214 OFFICE O/P EST MOD 30 MIN: CPT | Mod: ,,, | Performed by: FAMILY MEDICINE

## 2023-04-14 PROCEDURE — 99214 PR OFFICE/OUTPT VISIT, EST, LEVL IV, 30-39 MIN: ICD-10-PCS | Mod: ,,, | Performed by: FAMILY MEDICINE

## 2023-04-14 RX ORDER — TIZANIDINE 4 MG/1
4 TABLET ORAL 3 TIMES DAILY PRN
Qty: 20 TABLET | Refills: 0 | Status: SHIPPED | OUTPATIENT
Start: 2023-04-14 | End: 2023-04-24

## 2023-04-14 RX ORDER — PREDNISONE 10 MG/1
10 TABLET ORAL DAILY
Qty: 3 TABLET | Refills: 0 | Status: SHIPPED | OUTPATIENT
Start: 2023-04-14 | End: 2023-04-17

## 2023-04-14 NOTE — PROGRESS NOTES
Subjective:       Patient ID: Talia Lawrence is a 52 y.o. female.    Chief Complaint: Toe Pain (Big toe pain x 2 days patient had a fall. Breast bone hurts also. Pain level is 7. /)    HPI  Review of Systems   Constitutional:  Negative for activity change, appetite change, chills, diaphoresis, fatigue, fever and unexpected weight change.   HENT:  Negative for nasal congestion, dental problem, drooling, ear discharge, ear pain, facial swelling, hearing loss, mouth sores, nosebleeds, postnasal drip, rhinorrhea, sinus pressure/congestion, sneezing, sore throat, tinnitus, trouble swallowing, voice change and goiter.    Eyes:  Negative for photophobia, discharge, itching and visual disturbance.   Respiratory:  Negative for apnea, cough, choking, chest tightness, shortness of breath, wheezing and stridor.    Cardiovascular:  Negative for chest pain, palpitations, leg swelling and claudication.   Gastrointestinal:  Negative for abdominal distention, abdominal pain, anal bleeding, blood in stool, change in bowel habit, constipation, diarrhea, nausea, vomiting and change in bowel habit.   Endocrine: Negative for cold intolerance, heat intolerance, polydipsia, polyphagia and polyuria.   Genitourinary:  Negative for bladder incontinence, decreased urine volume, difficulty urinating, dysuria, enuresis, flank pain, frequency, hematuria, nocturia, pelvic pain and urgency.   Musculoskeletal:  Positive for joint swelling, leg pain and myalgias. Negative for arthralgias, back pain, gait problem, neck pain, neck stiffness and joint deformity.   Integumentary:  Negative for pallor, rash, wound, breast mass and breast tenderness.   Allergic/Immunologic: Negative for environmental allergies, food allergies and immunocompromised state.   Neurological:  Negative for dizziness, vertigo, tremors, seizures, syncope, facial asymmetry, speech difficulty, weakness, light-headedness, numbness, headaches, coordination difficulties, memory  loss and coordination difficulties.   Hematological:  Negative for adenopathy. Does not bruise/bleed easily.   Psychiatric/Behavioral:  Negative for agitation, behavioral problems, confusion, decreased concentration, dysphoric mood, hallucinations, self-injury, sleep disturbance and suicidal ideas. The patient is not nervous/anxious and is not hyperactive.    Breast: Negative for mass and tenderness      Objective:      Physical Exam  Vitals reviewed.   Constitutional:       Appearance: Normal appearance.   HENT:      Head: Normocephalic and atraumatic.      Right Ear: Tympanic membrane, ear canal and external ear normal.      Left Ear: Tympanic membrane, ear canal and external ear normal.      Nose: Nose normal.      Mouth/Throat:      Mouth: Mucous membranes are moist.      Pharynx: Oropharynx is clear.   Eyes:      Extraocular Movements: Extraocular movements intact.      Conjunctiva/sclera: Conjunctivae normal.      Pupils: Pupils are equal, round, and reactive to light.   Cardiovascular:      Rate and Rhythm: Normal rate and regular rhythm.      Pulses: Normal pulses.      Heart sounds: Normal heart sounds.   Pulmonary:      Effort: Pulmonary effort is normal.      Breath sounds: Normal breath sounds.   Abdominal:      General: Bowel sounds are normal.      Palpations: Abdomen is soft.   Musculoskeletal:         General: Swelling, tenderness and signs of injury present. Normal range of motion.      Cervical back: Normal range of motion and neck supple.   Skin:     General: Skin is warm and dry.   Neurological:      General: No focal deficit present.      Mental Status: She is alert. Mental status is at baseline.   Psychiatric:         Mood and Affect: Mood normal.         Behavior: Behavior normal.         Thought Content: Thought content normal.         Judgment: Judgment normal.       Assessment:       1. Rib pain on right side    2. Fall, initial encounter    3. Right foot pain        Plan:     Rib pain on  right side    Fall, initial encounter  -     X-Ray Ribs 2 View Right; Future; Expected date: 04/14/2023  -     X-Ray Foot Complete 3 view Right; Future; Expected date: 04/14/2023  -     X-Ray Ankle Complete 3 View Right; Future; Expected date: 04/14/2023    Right foot pain    Other orders  -     predniSONE (DELTASONE) 10 MG tablet; Take 1 tablet (10 mg total) by mouth once daily. for 3 days  Dispense: 3 tablet; Refill: 0  -     tiZANidine (ZANAFLEX) 4 MG tablet; Take 1 tablet (4 mg total) by mouth 3 (three) times daily as needed (spasm).  Dispense: 20 tablet; Refill: 0

## 2023-06-09 NOTE — PROGRESS NOTES
Subjective:       Patient ID: Talia Lawrence is a 52 y.o. female.    Chief Complaint: General Diabetes Follow-up (Here for fu   has cgm freestyle aline checks sugar 4-5 times daily  no complaint s)    Here today for routine evaluation and med refill.   Aline download reviewed at the bedside. 3 % very high, 29% high, 66% target, 1 % low and 1% very low  Hypoglycemia mid afternoons and occasionally at 2-3 am.     Lab Results       Component                Value               Date                       HGBA1C                   8.1 (A)             02/21/2023            Lab Results       Component                Value               Date                       MICROALBUR               1.3                 04/28/2022            Lab Results       Component                Value               Date                       CHOL                     172                 04/28/2022                 CHOL                     170                 04/22/2021                 CHOL                     155                 10/22/2020            Lab Results       Component                Value               Date                       HDL                      42                  04/28/2022                 HDL                      43                  04/22/2021                 HDL                      53                  10/22/2020            Lab Results       Component                Value               Date                       LDLCALC                  66                  04/28/2022                 LDLCALC                  96                  04/22/2021                 LDLCALC                  85                  10/22/2020            No results found for: DLDL  Lab Results       Component                Value               Date                       TRIG                     321 (H)             04/28/2022                 TRIG                     157 (H)             04/22/2021                 TRIG                     83                   10/22/2020              f1 Lab Results       Component                Value               Date                       CHOLHDL                  4.1                 04/28/2022                 CHOLHDL                  4.0                 04/22/2021                 CHOLHDL                  2.9                 10/22/2020            CMP  Sodium       Date                     Value               Ref Range           Status                01/25/2023               134 (L)             136 - 145 mmol*     Final            ----------  Potassium       Date                     Value               Ref Range           Status                01/25/2023               4.7                 3.5 - 5.1 mmol*     Final            ----------  Chloride       Date                     Value               Ref Range           Status                01/25/2023               100                 98 - 107 mmol/L     Final            ----------  CO2       Date                     Value               Ref Range           Status                01/25/2023               31                  21 - 32 mmol/L      Final            ----------  Glucose       Date                     Value               Ref Range           Status                01/25/2023               196 (H)             74 - 106 mg/dL      Final            ----------  BUN       Date                     Value               Ref Range           Status                01/25/2023               24 (H)              7 - 18 mg/dL        Final            ----------  Creatinine       Date                     Value               Ref Range           Status                01/25/2023               1.43 (H)            0.55 - 1.02 mg*     Final            ----------  Calcium       Date                     Value               Ref Range           Status                01/25/2023               9.8                 8.5 - 10.1 mg/*     Final            ----------  Total Protein       Date                     Value                Ref Range           Status                11/13/2022               7.0                 6.4 - 8.2 g/dL      Final            ----------  Albumin       Date                     Value               Ref Range           Status                11/13/2022               3.5                 3.5 - 5.0 g/dL      Final            ----------  Bilirubin, Total       Date                     Value               Ref Range           Status                11/13/2022               0.5                 >0.0 - 1.2 mg/*     Final            ----------  Alk Phos       Date                     Value               Ref Range           Status                11/13/2022               155 (H)             41 - 108 U/L        Final            ----------  AST       Date                     Value               Ref Range           Status                11/13/2022               12 (L)              15 - 37 U/L         Final            ----------  ALT       Date                     Value               Ref Range           Status                11/13/2022               16                  13 - 56 U/L         Final            ----------  Anion Gap       Date                     Value               Ref Range           Status                01/25/2023               8                   7 - 16 mmol/L       Final            ----------  eGFR       Date                     Value               Ref Range           Status                01/25/2023               44 (L)              >=60 mL/min/1.*     Final            ----------      Review of Systems   Constitutional:  Negative for activity change, appetite change, diaphoresis and fatigue.   HENT:  Negative for nasal congestion, facial swelling and sinus pressure/congestion.    Eyes:  Negative for visual disturbance.   Respiratory:  Negative for shortness of breath and wheezing.    Cardiovascular:  Negative for chest pain and leg swelling.   Gastrointestinal:  Negative for constipation, diarrhea, nausea and  vomiting.   Endocrine: Negative for polydipsia, polyphagia and polyuria.   Genitourinary:  Negative for dysuria, frequency and urgency.   Musculoskeletal:  Negative for gait problem and myalgias.   Integumentary:  Negative for color change, rash and wound.   Neurological:  Negative for dizziness, syncope, weakness, headaches, coordination difficulties and coordination difficulties.   Hematological:  Does not bruise/bleed easily.   Psychiatric/Behavioral:  Negative for self-injury, sleep disturbance and suicidal ideas. The patient is not nervous/anxious.        Objective:      Physical Exam  Vitals and nursing note reviewed.   Constitutional:       Appearance: Normal appearance.   HENT:      Head: Normocephalic.   Neck:      Thyroid: No thyromegaly.      Vascular: No carotid bruit.   Cardiovascular:      Rate and Rhythm: Normal rate and regular rhythm.      Pulses:           Dorsalis pedis pulses are 2+ on the right side and 2+ on the left side.        Posterior tibial pulses are 2+ on the right side and 2+ on the left side.      Heart sounds: Normal heart sounds.   Pulmonary:      Effort: Pulmonary effort is normal.      Breath sounds: Normal breath sounds.   Musculoskeletal:         General: Normal range of motion.      Right foot: Normal range of motion.      Left foot: Normal range of motion.   Feet:      Right foot:      Protective Sensation: 6 sites tested.  6 sites sensed.      Skin integrity: Skin integrity normal.      Left foot:      Protective Sensation: 6 sites tested.  6 sites sensed.      Skin integrity: Skin integrity normal.   Skin:     General: Skin is warm and dry.   Neurological:      General: No focal deficit present.      Mental Status: She is alert and oriented to person, place, and time.   Psychiatric:         Mood and Affect: Mood normal.         Behavior: Behavior normal.         Thought Content: Thought content normal.         Judgment: Judgment normal.       Assessment:       Problem List  "Items Addressed This Visit          Neuro    Chronic migraine without aura       Endocrine    Diabetes mellitus type I - Primary    Relevant Medications    amLODIPine-benazepriL (LOTREL) 5-40 mg per capsule    atorvastatin (LIPITOR) 40 MG tablet    BD ULTRA-FINE CARLINE PEN NEEDLE 32 gauge x 5/32" Ndle    blood sugar diagnostic (BLOOD GLUCOSE TEST) Strp    blood-glucose sensor Deborah    flash glucose sensor (FREESTYLE CARLOS 2 SENSOR) Kit    glucagon HCL (GLUCAGON, HCL, EMERGENCY KIT) 1 mg SolR    insulin aspart U-100 (NOVOLOG FLEXPEN U-100 INSULIN) 100 unit/mL (3 mL) InPn pen    insulin glargine, TOUJEO, (TOUJEO SOLOSTAR U-300 INSULIN) 300 unit/mL (1.5 mL) InPn pen    metoprolol succinate (TOPROL-XL) 25 MG 24 hr tablet    Other Relevant Orders    POCT Glucose, Hand-Held Device (Completed)    Comprehensive Metabolic Panel    Lipid Panel    Microalbumin/Creatinine Ratio, Urine         Plan:   1. Type 1 diabetes mellitus with other specified complication  No change in meds today.  Call for any hypoglycemia.  Change to carlos 3 per pt request. Keep alarms on to prevent hypoglycemia  Keep glucagon pen with you at all times.     -     Hemoglobin A1C, POCT  -     POCT Glucose, Hand-Held Device    2. Primary hypertension  Continue lotrel for ACE coverage,    3. Mixed hyperlipidemia  lipitor    4. Diabetic gastroparesis  Gastroparesis diet    5. Stable proliferative diabetic retinopathy associated with type 2 diabetes mellitus, unspecified laterality      6. Coronary artery disease involving native coronary artery of native heart without angina pectoris      7. CKD stage 3 due to type 1 diabetes mellitus  farxiga    Other orders  -     insulin glargine, TOUJEO, (TOUJEO SOLOSTAR U-300 INSULIN) 300 unit/mL (1.5 mL) InPn pen; Inject 45 Units into the skin once daily.  Dispense: 10 pen; Refill: 1   "

## 2023-06-12 DIAGNOSIS — K21.9 GASTROESOPHAGEAL REFLUX DISEASE, UNSPECIFIED WHETHER ESOPHAGITIS PRESENT: ICD-10-CM

## 2023-06-12 RX ORDER — PANTOPRAZOLE SODIUM 40 MG/1
80 TABLET, DELAYED RELEASE ORAL DAILY
Qty: 180 TABLET | Refills: 0 | Status: SHIPPED | OUTPATIENT
Start: 2023-06-12 | End: 2023-06-13 | Stop reason: SDUPTHER

## 2023-06-12 NOTE — TELEPHONE ENCOUNTER
----- Message from Isabella Estrada sent at 6/12/2023  1:28 PM CDT -----  Needs pantoprazole refill needs 180 qty takes 2 a day.  [phone]

## 2023-06-13 ENCOUNTER — TELEPHONE (OUTPATIENT)
Dept: DIABETES SERVICES | Facility: CLINIC | Age: 53
End: 2023-06-13
Payer: OTHER GOVERNMENT

## 2023-06-13 ENCOUNTER — OFFICE VISIT (OUTPATIENT)
Dept: DIABETES SERVICES | Facility: CLINIC | Age: 53
End: 2023-06-13
Payer: OTHER GOVERNMENT

## 2023-06-13 VITALS
RESPIRATION RATE: 14 BRPM | WEIGHT: 186 LBS | HEART RATE: 83 BPM | DIASTOLIC BLOOD PRESSURE: 52 MMHG | OXYGEN SATURATION: 99 % | HEIGHT: 66 IN | BODY MASS INDEX: 29.89 KG/M2 | SYSTOLIC BLOOD PRESSURE: 114 MMHG

## 2023-06-13 DIAGNOSIS — E10.69 TYPE 1 DIABETES MELLITUS WITH OTHER SPECIFIED COMPLICATION: Primary | ICD-10-CM

## 2023-06-13 DIAGNOSIS — E10.69 TYPE 1 DIABETES MELLITUS WITH OTHER SPECIFIED COMPLICATION: ICD-10-CM

## 2023-06-13 DIAGNOSIS — E10.9 TYPE 1 DIABETES MELLITUS WITHOUT COMPLICATION: ICD-10-CM

## 2023-06-13 DIAGNOSIS — G43.709 CHRONIC MIGRAINE WITHOUT AURA WITHOUT STATUS MIGRAINOSUS, NOT INTRACTABLE: ICD-10-CM

## 2023-06-13 DIAGNOSIS — K21.9 GASTROESOPHAGEAL REFLUX DISEASE, UNSPECIFIED WHETHER ESOPHAGITIS PRESENT: ICD-10-CM

## 2023-06-13 LAB — GLUCOSE SERPL-MCNC: 117 MG/DL (ref 70–110)

## 2023-06-13 PROCEDURE — 99215 OFFICE O/P EST HI 40 MIN: CPT | Mod: PBBFAC | Performed by: NURSE PRACTITIONER

## 2023-06-13 PROCEDURE — 99214 OFFICE O/P EST MOD 30 MIN: CPT | Mod: S$PBB,,, | Performed by: NURSE PRACTITIONER

## 2023-06-13 PROCEDURE — 95251 CONT GLUC MNTR ANALYSIS I&R: CPT | Mod: ,,, | Performed by: NURSE PRACTITIONER

## 2023-06-13 PROCEDURE — 99214 PR OFFICE/OUTPT VISIT, EST, LEVL IV, 30-39 MIN: ICD-10-PCS | Mod: S$PBB,,, | Performed by: NURSE PRACTITIONER

## 2023-06-13 PROCEDURE — 95251 PR GLUCOSE MONITOR, 72 HOUR, PHYS INTERP: ICD-10-PCS | Mod: ,,, | Performed by: NURSE PRACTITIONER

## 2023-06-13 PROCEDURE — 82962 GLUCOSE BLOOD TEST: CPT | Mod: PBBFAC | Performed by: NURSE PRACTITIONER

## 2023-06-13 RX ORDER — BLOOD-GLUCOSE SENSOR
EACH MISCELLANEOUS
Qty: 6 EACH | Refills: 1 | Status: SHIPPED | OUTPATIENT
Start: 2023-06-13 | End: 2023-06-13

## 2023-06-13 RX ORDER — INSULIN ASPART 100 [IU]/ML
INJECTION, SOLUTION INTRAVENOUS; SUBCUTANEOUS
Qty: 45 ML | Refills: 3 | Status: SHIPPED | OUTPATIENT
Start: 2023-06-13 | End: 2023-12-11 | Stop reason: SDUPTHER

## 2023-06-13 RX ORDER — INSULIN GLARGINE 300 U/ML
45 INJECTION, SOLUTION SUBCUTANEOUS DAILY
Qty: 10 PEN | Refills: 1 | Status: SHIPPED | OUTPATIENT
Start: 2023-06-13 | End: 2023-12-11 | Stop reason: SDUPTHER

## 2023-06-13 RX ORDER — PEN NEEDLE, DIABETIC 31 GX5/16"
NEEDLE, DISPOSABLE MISCELLANEOUS
Qty: 400 EACH | Refills: 3 | Status: SHIPPED | OUTPATIENT
Start: 2023-06-13 | End: 2023-12-11 | Stop reason: SDUPTHER

## 2023-06-13 RX ORDER — GLUCAGON HCL 1 MG
1 VIAL (EA) INJECTION
Qty: 1 EACH | Refills: 1 | Status: SHIPPED | OUTPATIENT
Start: 2023-06-13

## 2023-06-13 RX ORDER — METOPROLOL SUCCINATE 25 MG/1
25 TABLET, EXTENDED RELEASE ORAL DAILY
Qty: 90 TABLET | Refills: 1 | Status: SHIPPED | OUTPATIENT
Start: 2023-06-13 | End: 2023-11-09 | Stop reason: SDUPTHER

## 2023-06-13 RX ORDER — BLOOD-GLUCOSE SENSOR
EACH MISCELLANEOUS
Qty: 6 EACH | Refills: 1 | Status: SHIPPED | OUTPATIENT
Start: 2023-06-13 | End: 2023-12-05 | Stop reason: SDUPTHER

## 2023-06-13 RX ORDER — FLASH GLUCOSE SENSOR
KIT MISCELLANEOUS
Qty: 6 KIT | Refills: 3 | Status: SHIPPED | OUTPATIENT
Start: 2023-06-13 | End: 2023-06-13

## 2023-06-13 RX ORDER — ATORVASTATIN CALCIUM 40 MG/1
40 TABLET, FILM COATED ORAL NIGHTLY
Qty: 90 TABLET | Refills: 3 | Status: SHIPPED | OUTPATIENT
Start: 2023-06-13 | End: 2024-06-12

## 2023-06-13 RX ORDER — PANTOPRAZOLE SODIUM 40 MG/1
80 TABLET, DELAYED RELEASE ORAL DAILY
Qty: 180 TABLET | Refills: 0 | Status: SHIPPED | OUTPATIENT
Start: 2023-06-13 | End: 2023-07-03

## 2023-06-13 RX ORDER — AMLODIPINE AND BENAZEPRIL HYDROCHLORIDE 5; 40 MG/1; MG/1
1 CAPSULE ORAL DAILY
Qty: 90 CAPSULE | Refills: 3 | Status: SHIPPED | OUTPATIENT
Start: 2023-06-13

## 2023-06-13 NOTE — TELEPHONE ENCOUNTER
----- Message from Pooja Roberts sent at 6/13/2023  3:01 PM CDT -----  PLEASE CALL HER FREESTYLE CARLOS 3 IN [facility].

## 2023-06-13 NOTE — PATIENT INSTRUCTIONS
Low Carbohydrate snacks/quick meals    Ham and cheese rollups - slice of ham wrapped around a string cheese/cheese slice. (0 gm carb)  Cucumber boats (stuffed with chicken salad or tuna salad - no fruit or sweet pickle in salad) (0 gm carb)  Celery and Peanut Butter (2 stalks with 1 Tbsp PNB = 6 gm carb)  Nuts - mixed (1/4 cup = 6 gm carb)  Sunflower seeds- without shell (1/4 cup = 7 gm carb) In the shell (1 cup = 3.3 gm carb)  Deviled eggs (no sweet pickles) - (0 gm carb)  Hard boiled eggs - (0 gm carb)  Guacamole with raw vegetables (mashed avocado with lime juice and seasoning to taste) (1 cup raw veg = 5 gm carb)  Ranch dressing with raw vegetables -(2 Tbsp Ranch = 2 gm carb, ½ cup raw veggies = 2.5 gm carb  Lasagna rolls- Slice zucchini thinly lengthwise and microwave for 1-2 minutes. Fill with ricotta, parmesan cheese. Roll and top with low carb tomato sauce. (5 rolls = 5 gm carb)  Crust less pizza -pepperoni or Vincentian lowe topped with cheese and veggies +1 tbsp tomato sauce, microwave (1 gm carb)  Beef jerky - read label for lower carb jerky  Olives - Black (5 olives = 1 gm carb) Green (5 olives = 1 gm carb)  Dill pickles (1 whole dill pickle = 2 gm carb)  Sugar free jell-o (0 gm carb)  [place] Chicken Marinate chicken strips in 2 Tbsp sugar free maple syrup, 1 Tbsp lime juice, 1 Tbsp fresh cilantro. [place] or cook in skillet sprayed with oil. (0 gm carb)  Chicken nachos - Heat oven to 350. Rub 5-6 chicken tenders with 1 Tbsp Jesus Albreto Taco seasoning then drizzle with vegetable oil. Bake at 350 for 3-4 min and then flip and cook 3-4 min. Top with grated cheese, jalopenos, lowe, green onion. Place back in oven at temp of 425 for 3-4 minutes. Serve with side of 2 Tbsp ranch dressing or sour cream. (3 gm carb)  Egg Mcmuffin: using egg (or egg white for a healthier version) as the bread put one slice of cheese with 1 slice Vincentian lowe or sausage jon (1 gm carb per sandwich)  Southern Okra - Wash and dry  fresh okra. Toss with olive oil, salt and pepper and roast in oven 10-20 minutes. (1 cup = 5 gm carb)  Crispy green beans - Jaz 5 lbs fresh green beans. Toss with 1/3 cup melted coconut oil and sprinkle with 4 tsp salt and 1 tsp onion and garlic powder. Bake at 170-175 for 8 hours or dehydrate overnight in . (1 cup = 5 gm carb)  Salt and vinegar zucchini chips - Thinly slice zucchini as thin as possible. In small bowl whisk 2 Tbsp olive oil, 2 Tbsp white vinegar, and 2 tsp sea salt. Add zucchini and dehydrate or bake on 170 for 3-4 hours till crispy. (½ cup = 3 gm carb). Make in large batches and keep in air tight container up to 1 week   Zucchini Jesus Alberto chips - Thinly slice zucchini as thin as possible. Heat oil in fryer to 350 and drop zucchini in hot oil working in batches of about 20 chips at a time. Remove, drain and sprinkle with Jesus Alberto Taco seasoning. (1/2 cup = 3 gm carb). Make in large batches and keep in air tight container up to 1 week.  Jesus Alberto Taco Seasoning - 2 Tbsp chili powder, ½ tsp garlic powder, ½ tsp onion powder, ½ tsp crushed red pepper flakes, ½ tsp dried oregano, 3 tsp ground cumin, 2 tsp sea salt, 2 tsp black pepper. Makes 20 servings (0 gm carb)  [place] milk (1 cup almond milk = 0.3 gm carb)  Cheese chips - Preheat oven 400. On a nonstick baking sheet add cheese slices (Cheddar, Swiss, Provolone, Liban Rene), bake 10-12 minutes or until browned. Cool completely before removal. (2 slices = 1 gm carb). Store in air tight container up to 1 week.   Breakfast balls - mix together 2 lbs pork sausage, 1 lb ground beef, 3 eggs, 2 Tbsp dried onion flakes, ½ tsp black pepper, ½ lb sharp cheddar cheese, shredded. Form into 4 dozen 1 balls. Bake on cookie sheet for 25 min at 375. Cool and may be frozen as well individually. (0 gm carb)  Meat muffins - spray muffin tin with cooking spray. Line muffin tin with 1 slice ham. Add 1 raw egg. Top with grated cheese and salt and pepper. Bake 10-12  min. (0 gm carb)  Pork rinds/Cracklings (0 gm carb)  Gummy Bears - Add 1 packet of Sugar free jello (flavor of your choice), 1 packet unflavored gelatin and 1/3 cup cold water to small sauce pan. Stir well and heat over low till it become liquid and dissolved (2-3 minutes). Pour into mold and refrigerate 30-40 minutes. Add only ¼ cup if you want them more firm. (0 gm carb)  Cheese and meat kabobs (0 gm carb)  Garlic parmesan cheese crisps - Preheat oven to 350. On a nonstick baking sheet, place 1 Tbsp grated parmesan cheese, sprinkle with garlic and basil. Bake about 5 minutes or until outside edges become devries brown. Cool completely before removal (5 crisps = 1 gm carb)  Antipasti - Pepperoni, salami, green pepper, jalapeno pepper, mushrooms, onion, black olives, cheddar and provolone cheese cubes. Toss with Italian salad dressing. (1/2 cup = 5 gm carb)  [place] chicken wings - (0 gm carb)  Cheetos- preheat oven to 300. Chop ½ cup freshly shredded cheddar cheese that is frozen and place in  or  and chop into tiny pieces. In a mixing bowl, whip 3 egg whites and 1/8 tsp cream of tartar until VERY stiff peaks form. Sprinkle cheese on top of egg whites and then fold cheese into egg whites very carefully. Place mixture into large ziplock bag and cut hole in corner. Gently squeeze onto greased nonstick cookie pan in cheestos like shapes. Sprinkle with parmesan cheese. Bake for 30 minutes. Turn over off and leave in there for another 30 minutes. (1 cup = 1 gm carb)  Cheesy cauliflower tots - preheat oven to 400. Spray mini muffin tin with nonstick spray. Chop ½ large head of cauliflower into small pieces. Place in microwavable bowl and cover. Microwave 2 minutes. Drain cauliflower. Place in  and pulse till finely chopped. To this add, 1/3 cup grated sharp cheddar, ¼ cup grated parmesan cheese, 2 Tbsp. almond flour, ¼ tsp salt, ½ tsp all purpose seasoning and 1 egg. Mix well. Place 1  Tbsp of mixture in each mini muffin tin. Bake 15 minutes. Remove and flip, bake another 15 minutes. Makes 24 tots. (10 tots = 5 gm carb)  [employer] protein bars (carbs vary)  Rene snacks - Preheat oven 350. 1 cup shredded Pepperjack paper. Scoop 1 Tbsp cheese. Place on non stick pan and press slightly flat. Top with 1 slice jalapeno pepper. Bake for 10-12 minutes till brown and crispy. Cool completely before removal. (10 crisps = 1 gm carb)  Snake bite (aka jalapeno poppers) - remove seeds and membrane from jalapenos, fill with cream cheese, wrap in lowe. Stick a toothpick through to hold lowe in place. Bake 15-20 min at 400 (4 bites = 2 gm carb)  5 minute PNB mousse - Beat together 4 oz softened cream cheese, 2 Tbsp natural PNB (no sugar added), ½ tsp vanilla extract and 1/3 cup Splenda. Fold in 1 cup Reddiwhip. Place in container of your choice and refrigerate up to 1 week. Top your serving with 1Tbsp Sugar free chocolate syrup. (1/2 cup = 4 gm carb)  BLT - Fill a leaf of maya lettuce leaf with 1 Tbsp LF luke, 2 slices lowe, sliced tomato. Sprinkle salt and pepper. (0 gm carb)  Cinnamon and coconut bombs - in microwave safe bowl, mix 1 cup coconut butter, 1 cup coconut milk (full fat canned, not from box), 1 tsp vanilla extract, ½ tsp nutmeg and cinnamon, 1 tsp stevia powder extract. Melt until combined. Place bowl in fridge until hard enough to roll into 10-12 balls, about 30 minutes. Roll balls in 1 cup shredded coconut. Store in refrigerator (1 ball = 1 gm carb)

## 2023-06-13 NOTE — TELEPHONE ENCOUNTER
----- Message from Isabella Estrada sent at 6/13/2023  4:03 PM CDT -----  Pt called back about rx sent to [facility], needs to be ? Protonix 180 tablets 90 day supply.

## 2023-06-19 ENCOUNTER — OFFICE VISIT (OUTPATIENT)
Dept: CARDIOLOGY | Facility: CLINIC | Age: 53
End: 2023-06-19
Payer: OTHER GOVERNMENT

## 2023-06-19 VITALS
HEIGHT: 66 IN | DIASTOLIC BLOOD PRESSURE: 60 MMHG | SYSTOLIC BLOOD PRESSURE: 118 MMHG | BODY MASS INDEX: 29.92 KG/M2 | WEIGHT: 186.19 LBS | HEART RATE: 75 BPM | OXYGEN SATURATION: 99 %

## 2023-06-19 DIAGNOSIS — E78.2 MIXED HYPERLIPIDEMIA: ICD-10-CM

## 2023-06-19 DIAGNOSIS — I25.10 CORONARY ARTERY DISEASE, UNSPECIFIED VESSEL OR LESION TYPE, UNSPECIFIED WHETHER ANGINA PRESENT, UNSPECIFIED WHETHER NATIVE OR TRANSPLANTED HEART: Primary | ICD-10-CM

## 2023-06-19 PROCEDURE — 93005 ELECTROCARDIOGRAM TRACING: CPT | Mod: PBBFAC | Performed by: INTERNAL MEDICINE

## 2023-06-19 PROCEDURE — 99214 OFFICE O/P EST MOD 30 MIN: CPT | Mod: S$PBB,,, | Performed by: NURSE PRACTITIONER

## 2023-06-19 PROCEDURE — 99214 PR OFFICE/OUTPT VISIT, EST, LEVL IV, 30-39 MIN: ICD-10-PCS | Mod: S$PBB,,, | Performed by: NURSE PRACTITIONER

## 2023-06-19 PROCEDURE — 93010 EKG 12-LEAD: ICD-10-PCS | Mod: S$PBB,,, | Performed by: INTERNAL MEDICINE

## 2023-06-19 PROCEDURE — 99215 OFFICE O/P EST HI 40 MIN: CPT | Mod: PBBFAC | Performed by: NURSE PRACTITIONER

## 2023-06-19 PROCEDURE — 93010 ELECTROCARDIOGRAM REPORT: CPT | Mod: S$PBB,,, | Performed by: INTERNAL MEDICINE

## 2023-06-19 NOTE — ASSESSMENT & PLAN NOTE
[facility] 11/13/2022  2 vessel CAD with a 50% midLAD stenosis and a  of the RCA with collaterals from the left system  Asymptomatic   Continue ASA/Brilinta/LIpitor  RTC 6 months or sooner if needed.

## 2023-06-19 NOTE — PROGRESS NOTES
"PCP: Primary Doctor No    Referring Provider:     Subjective:   Talia Lawrence is a 52 y.o. female with hx of DM, type I, CAD, HTN and HLD. who presents for routine follow-up.  She states she is doing well as is having no issues.      2022 presents for HD follow up from [facility] which demonstrated 2 vessel CAD with a 50% mid LAD stenosis and a  of the RCA with collateral circulation from the left system. Her LVEF was normal. She is currently asymptomatic on current medical management. She does state that she her pills "get stuck" when she tries to swallow them. She did have recent esophageal dilation in 10/2022.         Fhx:Mother  + heart disease, thyroid disease and DM  Father + DM, cardiomyopathy, HTN  Brother with no known health problems  Shx : + tobacco or recreational drugs; + ETOH      EKG 2023 regular sinus rhythm, heart rate 65 beats per minute, normal EKG  Results for orders placed or performed in visit on 23   EKG 12-lead    Collection Time: 23 10:17 AM    Narrative    Test Reason : I25.10,    Vent. Rate : 065 BPM     Atrial Rate : 065 BPM     P-R Int : 156 ms          QRS Dur : 080 ms      QT Int : 396 ms       P-R-T Axes : 055 -17 033 degrees     QTc Int : 411 ms    Normal sinus rhythm  Normal ECG  When compared with ECG of 15-NOV-2022 13:21,  Questionable change in The axis    Referred By:             Confirmed By:       ECHO Results for orders placed during the hospital encounter of 22    Echo    Interpretation Summary  · Concentric remodeling and normal systolic function.  · The estimated ejection fraction is 70%.  · Normal left ventricular diastolic function.  · Mild pulmonic regurgitation.  · Normal right ventricular size with normal right ventricular systolic function.   [facility] Results for orders placed during the hospital encounter of 22    Cardiac catheterization    Conclusion    The Prox RCA to Dist RCA lesion was 95% stenosed.    The Dist RCA lesion was " 100% stenosed.    The Mid LAD lesion was 50% stenosed.    The ejection fraction was calculated to be 60%.    The left ventricular systolic function was normal.    The left ventricular end diastolic pressure was normal.    The pre-procedure left ventricular end diastolic pressure was 23.    The estimated blood loss was none.    There was two vessel coronary artery disease.    The procedure log was documented by Documenter: Agatha Guerra RN and verified by Masood Rosado MD.    Date: 11/15/2022  Time: 11:33 AM       Lab Results   Component Value Date     (L) 06/13/2023    K 5.0 06/13/2023     06/13/2023    CO2 29 06/13/2023    BUN 17 06/13/2023    CREATININE 1.24 (H) 06/13/2023    CALCIUM 9.6 06/13/2023    ANIONGAP 8 06/13/2023    ESTGFRAFRICA 45 (L) 04/22/2021    EGFRNONAA 52 (L) 04/28/2022       Lab Results   Component Value Date    CHOL 160 06/13/2023    CHOL 172 04/28/2022    CHOL 170 04/22/2021     Lab Results   Component Value Date    HDL 64 (H) 06/13/2023    HDL 42 04/28/2022    HDL 43 04/22/2021     Lab Results   Component Value Date    LDLCALC 76 06/13/2023    LDLCALC 66 04/28/2022    LDLCALC 96 04/22/2021     Lab Results   Component Value Date    TRIG 98 06/13/2023    TRIG 321 (H) 04/28/2022    TRIG 157 (H) 04/22/2021     Lab Results   Component Value Date    CHOLHDL 2.5 06/13/2023    CHOLHDL 4.1 04/28/2022    CHOLHDL 4.0 04/22/2021       Lab Results   Component Value Date    WBC 11.94 (H) 01/25/2023    HGB 13.7 01/25/2023    HCT 44.1 01/25/2023    MCV 76.0 (L) 01/25/2023     01/25/2023           Current Outpatient Medications:     albuterol (PROVENTIL/VENTOLIN HFA) 90 mcg/actuation inhaler, Inhale 2 puffs into the lungs every 6 (six) hours as needed for Wheezing. Rescue, Disp: 18 g, Rfl: 3    amLODIPine-benazepriL (LOTREL) 5-40 mg per capsule, Take 1 capsule by mouth once daily. amlodipine 5 mg-benazepril 40 mg capsule  TAKE ONE CAPSULE BY MOUTH EVERY DAY  amlodipine 5  "mg-benazepril 40 mg capsule  TAKE ONE CAPSULE BY MOUTH EVERY DAY, Disp: 90 capsule, Rfl: 3    atogepant (QULIPTA) 60 mg Tab, Take 60 mg by mouth once daily., Disp: 90 tablet, Rfl: 3    atorvastatin (LIPITOR) 40 MG tablet, Take 1 tablet (40 mg total) by mouth every evening., Disp: 90 tablet, Rfl: 3    BD ULTRA-FINE CARLINE PEN NEEDLE 32 gauge x 5/32" Ndle, Use to inject insulin 6 times daily., Disp: 400 each, Rfl: 3    blood sugar diagnostic (BLOOD GLUCOSE TEST) Strp, To check blood sugar 6 times daily, Disp: 600 each, Rfl: 3    blood-glucose meter (TRUE METRIX GLUCOSE METER MISC), by Misc.(Non-Drug; Combo Route) route. Use as directed, Disp: , Rfl:     blood-glucose sensor (FREESTYLE CARLOS 3 SENSOR) [facility], Use to monitor glucose and change every 14 days as directed., Disp: 6 each, Rfl: 1    brimonidine 0.2% (ALPHAGAN) 0.2 % Drop, Place 1 drop into both eyes 2 (two) times daily., Disp: , Rfl:     cholecalciferol, vitamin D3, 125 mcg (5,000 unit) Tab, Take 5,000 Units by mouth once daily., Disp: , Rfl:     dapagliflozin propanediol (FARXIGA ORAL), Take by mouth., Disp: , Rfl:     dorzolamide-timolol 2-0.5% (COSOPT) 22.3-6.8 mg/mL ophthalmic solution, instill one drop in affected eye twice a day., Disp: , Rfl:     flash glucose scanning reader (FREESTYLE CARLOS 2 READER) [facility], Use as directed., Disp: 1 each, Rfl: 0    fluticasone propionate (FLONASE) 50 mcg/actuation nasal spray, 2 sprays by Each Nostril route once daily., Disp: , Rfl:     fluticasone-salmeterol diskus inhaler 100-50 mcg, Inhale 1 puff into the lungs 2 (two) times daily. Controller, Disp: 180 each, Rfl: 3    glucagon HCL (GLUCAGON, HCL, EMERGENCY KIT) 1 mg SolR, Inject 1 each as directed as needed (hypoglycemia unsafe to swallow). Use as directed for hypoglycemia, Disp: 1 each, Rfl: 1    insulin aspart U-100 (NOVOLOG FLEXPEN U-100 INSULIN) 100 unit/mL (3 mL) InPn pen, Inject sq following sliding scale not to exceed 50 units daily, Disp: 45 mL, Rfl: 3    " "insulin glargine, TOUJEO, (TOUJEO SOLOSTAR U-300 INSULIN) 300 unit/mL (1.5 mL) InPn pen, Inject 45 Units into the skin once daily., Disp: 10 pen, Rfl: 1    isosorbide mononitrate (IMDUR) 60 MG 24 hr tablet, Take 1 tablet (60 mg total) by mouth once daily., Disp: 90 tablet, Rfl: 1    metoprolol succinate (TOPROL-XL) 25 MG 24 hr tablet, Take 1 tablet (25 mg total) by mouth once daily., Disp: 90 tablet, Rfl: 1    nitroGLYCERIN (NITROSTAT) 0.4 MG SL tablet, Place 1 tablet (0.4 mg total) under the tongue every 5 (five) minutes as needed for Chest pain., Disp: 25 tablet, Rfl: 3    pantoprazole (PROTONIX) 40 MG tablet, Take 2 tablets (80 mg total) by mouth once daily., Disp: 180 tablet, Rfl: 0    promethazine (PHENERGAN) 25 MG tablet, Take 1 tablet every 8 hours as needed for migraine.  Not more than 3 days of the week, Disp: 20 tablet, Rfl: 1    ticagrelor (BRILINTA) 90 mg tablet, Take 1 tablet (90 mg total) by mouth 2 (two) times daily., Disp: 180 tablet, Rfl: 3  Meds reviewed by not reconciled. She did not bring her meds.       Review of Systems   Respiratory:  Negative for shortness of breath.    Cardiovascular:  Negative for chest pain, palpitations, orthopnea, claudication, leg swelling and PND.   Neurological:  Negative for dizziness, loss of consciousness and weakness.       Objective:   /60 (BP Location: Left arm, Patient Position: Sitting)   Pulse 75   Ht 5' 6" (1.676 m)   Wt 84.5 kg (186 lb 3.2 oz)   SpO2 99%   BMI 30.05 kg/m²     Physical Exam  Vitals and nursing note reviewed.   Constitutional:       Appearance: Normal appearance. She is obese.   HENT:      Head: Normocephalic and atraumatic.   Neck:      Vascular: No carotid bruit.   Cardiovascular:      Rate and Rhythm: Normal rate and regular rhythm.      Pulses: Normal pulses.      Heart sounds: Normal heart sounds.   Pulmonary:      Effort: Pulmonary effort is normal.      Breath sounds: Normal breath sounds.   Abdominal:      Palpations: " Abdomen is soft.   Musculoskeletal:      Cervical back: Neck supple.      Right lower leg: No edema.      Left lower leg: No edema.   Skin:     General: Skin is warm and dry.      Capillary Refill: Capillary refill takes less than 2 seconds.   Neurological:      General: No focal deficit present.      Mental Status: She is alert and oriented to person, place, and time.   Psychiatric:         Mood and Affect: Mood normal.         Assessment:     1. Coronary artery disease, unspecified vessel or lesion type, unspecified whether angina present, unspecified whether native or transplanted heart  EKG 12-lead    EKG 12-lead      2. Mixed hyperlipidemia              Plan:   CAD (coronary artery disease)  [facility] 11/13/2022  2 vessel CAD with a 50% midLAD stenosis and a  of the RCA with collaterals from the left system  Asymptomatic   Continue ASA/Brilinta/LIpitor  RTC 6 months or sooner if needed.         Hyperlipidemia  Lipid panel results from 06/13/2023 reviewed  Triglycerides 98 mg/dL   LDL 77 mg/dL   HDL 64 mg/dL   Lipitor 40 mg p.o. daily   Lipids followed by PCP        Return to clinic in 6 months

## 2023-06-19 NOTE — ASSESSMENT & PLAN NOTE
Lipid panel results from 06/13/2023 reviewed  Triglycerides 98 mg/dL   LDL 77 mg/dL   HDL 64 mg/dL   Lipitor 40 mg p.o. daily   Lipids followed by PCP

## 2023-06-28 DIAGNOSIS — K21.9 GASTROESOPHAGEAL REFLUX DISEASE, UNSPECIFIED WHETHER ESOPHAGITIS PRESENT: ICD-10-CM

## 2023-07-03 RX ORDER — PANTOPRAZOLE SODIUM 40 MG/1
TABLET, DELAYED RELEASE ORAL
Qty: 180 TABLET | Refills: 0 | Status: SHIPPED | OUTPATIENT
Start: 2023-07-03 | End: 2023-11-09 | Stop reason: SDUPTHER

## 2023-07-11 RX ORDER — ISOSORBIDE MONONITRATE 60 MG/1
TABLET, EXTENDED RELEASE ORAL
Qty: 90 TABLET | Refills: 1 | OUTPATIENT
Start: 2023-07-11

## 2023-07-12 RX ORDER — ISOSORBIDE MONONITRATE 60 MG/1
60 TABLET, EXTENDED RELEASE ORAL DAILY
Qty: 90 TABLET | Refills: 1 | Status: SHIPPED | OUTPATIENT
Start: 2023-07-12 | End: 2023-11-09 | Stop reason: SDUPTHER

## 2023-08-23 DIAGNOSIS — R11.0 NAUSEA: ICD-10-CM

## 2023-08-23 RX ORDER — PROMETHAZINE HYDROCHLORIDE 25 MG/1
TABLET ORAL
Qty: 20 TABLET | Refills: 1 | Status: SHIPPED | OUTPATIENT
Start: 2023-08-23 | End: 2023-09-28 | Stop reason: SDUPTHER

## 2023-09-28 ENCOUNTER — OFFICE VISIT (OUTPATIENT)
Dept: NEUROLOGY | Facility: CLINIC | Age: 53
End: 2023-09-28
Payer: OTHER GOVERNMENT

## 2023-09-28 VITALS
OXYGEN SATURATION: 97 % | BODY MASS INDEX: 30.05 KG/M2 | WEIGHT: 187 LBS | HEIGHT: 66 IN | HEART RATE: 81 BPM | SYSTOLIC BLOOD PRESSURE: 112 MMHG | DIASTOLIC BLOOD PRESSURE: 66 MMHG

## 2023-09-28 DIAGNOSIS — R11.0 NAUSEA: ICD-10-CM

## 2023-09-28 DIAGNOSIS — G43.709 CHRONIC MIGRAINE WITHOUT AURA WITHOUT STATUS MIGRAINOSUS, NOT INTRACTABLE: Primary | ICD-10-CM

## 2023-09-28 DIAGNOSIS — G43.909 ACUTE MIGRAINE: ICD-10-CM

## 2023-09-28 PROCEDURE — 99213 OFFICE O/P EST LOW 20 MIN: CPT | Mod: S$PBB,,, | Performed by: NURSE PRACTITIONER

## 2023-09-28 PROCEDURE — 99213 PR OFFICE/OUTPT VISIT, EST, LEVL III, 20-29 MIN: ICD-10-PCS | Mod: S$PBB,,, | Performed by: NURSE PRACTITIONER

## 2023-09-28 PROCEDURE — 99215 OFFICE O/P EST HI 40 MIN: CPT | Mod: PBBFAC | Performed by: NURSE PRACTITIONER

## 2023-09-28 RX ORDER — CLOPIDOGREL BISULFATE 75 MG/1
75 TABLET ORAL DAILY
COMMUNITY

## 2023-09-28 RX ORDER — ASPIRIN 81 MG/1
81 TABLET ORAL DAILY
COMMUNITY

## 2023-09-28 RX ORDER — RIMEGEPANT SULFATE 75 MG/75MG
75 TABLET, ORALLY DISINTEGRATING ORAL ONCE AS NEEDED
Qty: 16 TABLET | Refills: 2 | Status: SHIPPED | OUTPATIENT
Start: 2023-09-28 | End: 2023-09-28

## 2023-09-28 RX ORDER — RANOLAZINE 500 MG/1
500 TABLET, EXTENDED RELEASE ORAL 2 TIMES DAILY
COMMUNITY
End: 2023-12-11

## 2023-09-28 RX ORDER — PROMETHAZINE HYDROCHLORIDE 25 MG/1
TABLET ORAL
Qty: 20 TABLET | Refills: 1 | Status: SHIPPED | OUTPATIENT
Start: 2023-09-28

## 2023-09-28 NOTE — PATIENT INSTRUCTIONS
Continue qulipta 60mg daily  Phenergan as needed, but not more than 2 days of the week  Continue nurtec as directed as needed

## 2023-09-28 NOTE — PROGRESS NOTES
"    Subjective:       Patient ID: Talia Lawrence is a 53 y.o. female     Chief Complaint:    Chief Complaint   Patient presents with    Follow-up        Allergies:  Lisinopril and Lyrica [pregabalin]    Current Medications:    Outpatient Encounter Medications as of 9/28/2023   Medication Sig Dispense Refill    albuterol (PROVENTIL/VENTOLIN HFA) 90 mcg/actuation inhaler Inhale 2 puffs into the lungs every 6 (six) hours as needed for Wheezing. Rescue 18 g 3    amLODIPine-benazepriL (LOTREL) 5-40 mg per capsule Take 1 capsule by mouth once daily. amlodipine 5 mg-benazepril 40 mg capsule   TAKE ONE CAPSULE BY MOUTH EVERY DAY    amlodipine 5 mg-benazepril 40 mg capsule   TAKE ONE CAPSULE BY MOUTH EVERY DAY 90 capsule 3    aspirin (ECOTRIN) 81 MG EC tablet Take 81 mg by mouth once daily.      atogepant (QULIPTA) 60 mg Tab Take 60 mg by mouth once daily. 90 tablet 3    atorvastatin (LIPITOR) 40 MG tablet Take 1 tablet (40 mg total) by mouth every evening. 90 tablet 3    BD ULTRA-FINE CARLINE PEN NEEDLE 32 gauge x 5/32" Ndle Use to inject insulin 6 times daily. 400 each 3    blood sugar diagnostic (BLOOD GLUCOSE TEST) Strp To check blood sugar 6 times daily 600 each 3    blood-glucose meter (TRUE METRIX GLUCOSE METER MISC) by Misc.(Non-Drug; Combo Route) route. Use as directed      blood-glucose sensor (FREESTYLE CARLOS 3 SENSOR) [facility] Use to monitor glucose and change every 14 days as directed. 6 each 1    brimonidine 0.2% (ALPHAGAN) 0.2 % Drop Place 1 drop into both eyes 2 (two) times daily.      cholecalciferol, vitamin D3, 125 mcg (5,000 unit) Tab Take 5,000 Units by mouth once daily.      clopidogreL (PLAVIX) 75 mg tablet Take 75 mg by mouth once daily.      dapagliflozin propanediol (FARXIGA ORAL) Take by mouth.      dorzolamide-timolol 2-0.5% (COSOPT) 22.3-6.8 mg/mL ophthalmic solution instill one drop in affected eye twice a day.      flash glucose scanning reader (FREESTYLE CARLOS 2 READER) [facility] Use as directed. 1 " each 0    fluticasone propionate (FLONASE) 50 mcg/actuation nasal spray 2 sprays by Each Nostril route once daily.      glucagon HCL (GLUCAGON, HCL, EMERGENCY KIT) 1 mg SolR Inject 1 each as directed as needed (hypoglycemia unsafe to swallow). Use as directed for hypoglycemia 1 each 1    insulin aspart U-100 (NOVOLOG FLEXPEN U-100 INSULIN) 100 unit/mL (3 mL) InPn pen Inject sq following sliding scale not to exceed 50 units daily 45 mL 3    insulin glargine, TOUJEO, (TOUJEO SOLOSTAR U-300 INSULIN) 300 unit/mL (1.5 mL) InPn pen Inject 45 Units into the skin once daily. 10 pen 1    isosorbide mononitrate (IMDUR) 60 MG 24 hr tablet Take 1 tablet (60 mg total) by mouth once daily. 90 tablet 1    metoprolol succinate (TOPROL-XL) 25 MG 24 hr tablet Take 1 tablet (25 mg total) by mouth once daily. 90 tablet 1    pantoprazole (PROTONIX) 40 MG tablet TAKE TWO TABLETS BY MOUTH EVERY  tablet 0    ranolazine (RANEXA) 500 MG Tb12 Take 500 mg by mouth 2 (two) times daily.      [DISCONTINUED] promethazine (PHENERGAN) 25 MG tablet TAKE ONE TABLET BY MOUTH EVERY 8 HOURS AS NEEDED FOR MIGRAINE. DO NOT EXCEED MORE THAN 3 DAYS OF THE WEEK 20 tablet 1    fluticasone-salmeterol diskus inhaler 100-50 mcg Inhale 1 puff into the lungs 2 (two) times daily. Controller 180 each 3    nitroGLYCERIN (NITROSTAT) 0.4 MG SL tablet Place 1 tablet (0.4 mg total) under the tongue every 5 (five) minutes as needed for Chest pain. 25 tablet 3    promethazine (PHENERGAN) 25 MG tablet TAKE ONE TABLET BY MOUTH EVERY 8 HOURS AS NEEDED FOR MIGRAINE. DO NOT EXCEED MORE THAN 3 DAYS OF THE WEEK 20 tablet 1    rimegepant (NURTEC) 75 mg odt Take 1 tablet (75 mg total) by mouth once as needed for Migraine. Place ODT tablet on the tongue; alternatively the ODT tablet may be placed under the tongue 16 tablet 2    ticagrelor (BRILINTA) 90 mg tablet Take 1 tablet (90 mg total) by mouth 2 (two) times daily. (Patient not taking: Reported on 9/28/2023) 180 tablet 3      No facility-administered encounter medications on file as of 9/28/2023.       History of Present Illness  52 y/o female following in neurology for migraine headaches.    Prior treated with topamax but developed glaucoma and it was stopped.  Already taking metoprolol for HTN, an prior on pamelor without reported improvement.  Was on emgality once monthly injections through headache clinic in [place], felt was working good, but said she was on too many shots already and wanted to change.  Thus I put her on Qulipta 60mg daily.  At her followup she reported about 8 headache days a month, which was half of what she was reporting at her initial visit with us.  Her insurance approved nurtec to use PRN.    She has CAD, followed by Dr. Rosado, so she is not able to use any triptan medications.    Prior sleep apnea workup was found negative.           Review of Systems  Review of Systems   Constitutional:  Negative for diaphoresis and fever.   HENT:  Negative for congestion, hearing loss and tinnitus.    Eyes:  Negative for blurred vision, double vision, photophobia, discharge and redness.   Respiratory:  Negative for cough and shortness of breath.    Cardiovascular:  Negative for chest pain.   Gastrointestinal:  Negative for abdominal pain, nausea and vomiting.   Musculoskeletal:  Negative for back pain, joint pain, myalgias and neck pain.   Skin:  Negative for itching and rash.   Neurological:  Positive for headaches. Negative for dizziness, tremors, sensory change, speech change, focal weakness, seizures, loss of consciousness and weakness.   Psychiatric/Behavioral:  Negative for depression, hallucinations and memory loss. The patient does not have insomnia.    All other systems reviewed and are negative.     Objective:     NEUROLOGICAL EXAMINATION:     MENTAL STATUS   Oriented to person, place, and time.   Registration: recalls 3 of 3 objects. Recall at 5 minutes: recalls 3 of 3 objects.   Attention: normal.  Concentration: normal.   Speech: speech is normal   Level of consciousness: alert  Knowledge: good and consistent with education.   Normal comprehension.     CRANIAL NERVES     CN II   Visual fields full to confrontation.   Visual acuity: normal  Right visual field deficit: none  Left visual field deficit: none     CN III, IV, VI   Pupils are equal, round, and reactive to light.  Extraocular motions are normal.   Right pupil: Size: 3 mm. Shape: regular. Reactivity: brisk. Consensual response: intact. Accommodation: intact.   Left pupil: Size: 3 mm. Shape: regular. Reactivity: brisk. Consensual response: intact. Accommodation: intact.   CN III: no CN III palsy  CN VI: no CN VI palsy  Nystagmus: none   Diplopia: none  Upgaze: normal  Downgaze: normal  Conjugate gaze: present  Vestibulo-ocular reflex: present    CN V   Facial sensation intact.   Right facial sensation deficit: none  Left facial sensation deficit: none  Right corneal reflex: normal  Left corneal reflex: normal    CN VII   Facial expression full, symmetric.   Right facial weakness: none  Left facial weakness: none  Right taste: normal  Left taste: normal    CN VIII   CN VIII normal.   Hearing: intact    CN IX, X   CN IX normal.   CN X normal.   Palate: symmetric    CN XI   CN XI normal.   Right sternocleidomastoid strength: normal  Left sternocleidomastoid strength: normal  Right trapezius strength: normal  Left trapezius strength: normal    CN XII   CN XII normal.   Tongue: not atrophic  Fasciculations: absent  Tongue deviation: none    MOTOR EXAM   Muscle bulk: normal  Overall muscle tone: normal  Right arm tone: normal  Left arm tone: normal  Right arm pronator drift: absent  Left arm pronator drift: absent  Right leg tone: normal  Left leg tone: normal    Strength   Right neck flexion: 5/5  Left neck flexion: 5/5  Right neck extension: 5/5  Left neck extension: 5/5  Right deltoid: 5/5  Left deltoid: 5/5  Right biceps: 5/5  Left biceps: 5/5  Right  triceps: 5/5  Left triceps: 5/5  Right wrist flexion: 5/5  Left wrist flexion: 5/5  Right wrist extension: 5/5  Left wrist extension: 5/5  Right interossei: 5/5  Left interossei: 5/5  Right iliopsoas: 5/5  Left iliopsoas: 5/5  Right quadriceps: 5/5  Left quadriceps: 5/5  Right hamstrin/5  Left hamstrin/5  Right anterior tibial: 5/5  Left anterior tibial: 5/5  Right posterior tibial: 5/5  Left posterior tibial: 5/5  Right gastroc: 5/5  Left gastroc: 5/5    REFLEXES     Reflexes   Right brachioradialis: 2+  Left brachioradialis: 2+  Right biceps: 2+  Left biceps: 2+  Right triceps: 2+  Left triceps: 2+  Right patellar: 2+  Left patellar: 2+  Right achilles: 2+  Left achilles: 2+  Right plantar: normal  Left plantar: normal  Right Avilez: absent  Left Avilez: absent  Right ankle clonus: absent  Left ankle clonus: absent  Right pendular knee jerk: absent  Left pendular knee jerk: absent    SENSORY EXAM   Light touch normal.   Right arm light touch: normal  Left arm light touch: normal  Right leg light touch: normal  Left leg light touch: normal  Vibration normal.   Right arm vibration: normal  Left arm vibration: normal  Right leg vibration: normal  Left leg vibration: normal  Proprioception normal.   Right arm proprioception: normal  Left arm proprioception: normal  Right leg proprioception: normal  Left leg proprioception: normal  Pinprick normal.   Right arm pinprick: normal  Left arm pinprick: normal  Right leg pinprick: normal  Left leg pinprick: normal  Graphesthesia: normal  Romberg: negative  Stereognosis: normal    GAIT AND COORDINATION     Gait  Gait: normal     Coordination   Finger to nose coordination: normal  Heel to shin coordination: normal  Tandem walking coordination: normal    Tremor   Resting tremor: absent  Intention tremor: absent  Action tremor: absent       Physical Exam  Vitals and nursing note reviewed.   Constitutional:       Appearance: Normal appearance.   HENT:      Head:  Normocephalic.   Eyes:      Extraocular Movements: Extraocular movements intact and EOM normal.      Pupils: Pupils are equal, round, and reactive to light.   Cardiovascular:      Rate and Rhythm: Normal rate and regular rhythm.   Pulmonary:      Effort: Pulmonary effort is normal.      Breath sounds: Normal breath sounds.   Musculoskeletal:         General: No swelling or tenderness. Normal range of motion.      Cervical back: Normal range of motion and neck supple.      Right lower leg: No edema.      Left lower leg: No edema.   Skin:     General: Skin is warm and dry.      Coloration: Skin is not jaundiced.      Findings: No rash.   Neurological:      General: No focal deficit present.      Mental Status: She is alert and oriented to person, place, and time.      GCS: GCS eye subscore is 4. GCS verbal subscore is 5. GCS motor subscore is 6.      Cranial Nerves: No cranial nerve deficit.      Sensory: No sensory deficit.      Motor: Motor function is intact. No weakness.      Coordination: Coordination is intact. Coordination normal. Finger-Nose-Finger Test, Heel to Shin Test and Romberg Test normal.      Gait: Gait is intact. Gait and tandem walk normal.      Deep Tendon Reflexes: Reflexes normal.      Reflex Scores:       Tricep reflexes are 2+ on the right side and 2+ on the left side.       Bicep reflexes are 2+ on the right side and 2+ on the left side.       Brachioradialis reflexes are 2+ on the right side and 2+ on the left side.       Patellar reflexes are 2+ on the right side and 2+ on the left side.       Achilles reflexes are 2+ on the right side and 2+ on the left side.  Psychiatric:         Mood and Affect: Mood normal.         Speech: Speech normal.         Behavior: Behavior normal.          Assessment:     Problem List Items Addressed This Visit          Neuro    Chronic migraine without aura - Primary     Other Visit Diagnoses       Acute migraine        Relevant Medications    rimegepant  (NURTEC) 75 mg odt    Nausea        Relevant Medications    promethazine (PHENERGAN) 25 MG tablet                   Primary Diagnosis and ICD10  Chronic migraine without aura without status migrainosus, not intractable [G43.709]    Plan:     Patient Instructions   Continue qulipta 60mg daily  Phenergan as needed, but not more than 2 days of the week  Continue nurtec as directed as needed    Medications Discontinued During This Encounter   Medication Reason    promethazine (PHENERGAN) 25 MG tablet Reorder         Requested Prescriptions     Signed Prescriptions Disp Refills    rimegepant (NURTEC) 75 mg odt 16 tablet 2     Sig: Take 1 tablet (75 mg total) by mouth once as needed for Migraine. Place ODT tablet on the tongue; alternatively the ODT tablet may be placed under the tongue    promethazine (PHENERGAN) 25 MG tablet 20 tablet 1     Sig: TAKE ONE TABLET BY MOUTH EVERY 8 HOURS AS NEEDED FOR MIGRAINE. DO NOT EXCEED MORE THAN 3 DAYS OF THE WEEK       No orders of the defined types were placed in this encounter.

## 2023-11-09 DIAGNOSIS — G43.709 CHRONIC MIGRAINE WITHOUT AURA WITHOUT STATUS MIGRAINOSUS, NOT INTRACTABLE: ICD-10-CM

## 2023-11-09 DIAGNOSIS — E10.69 TYPE 1 DIABETES MELLITUS WITH OTHER SPECIFIED COMPLICATION: ICD-10-CM

## 2023-11-09 DIAGNOSIS — K21.9 GASTROESOPHAGEAL REFLUX DISEASE, UNSPECIFIED WHETHER ESOPHAGITIS PRESENT: ICD-10-CM

## 2023-11-09 RX ORDER — PANTOPRAZOLE SODIUM 40 MG/1
40 TABLET, DELAYED RELEASE ORAL DAILY
Qty: 90 TABLET | Refills: 3 | Status: SHIPPED | OUTPATIENT
Start: 2023-11-09 | End: 2024-02-07

## 2023-11-09 RX ORDER — ATOGEPANT 60 MG/1
60 TABLET ORAL DAILY
Qty: 90 TABLET | Refills: 3 | Status: SHIPPED | OUTPATIENT
Start: 2023-11-09

## 2023-11-09 RX ORDER — METOPROLOL SUCCINATE 25 MG/1
25 TABLET, EXTENDED RELEASE ORAL DAILY
Qty: 90 TABLET | Refills: 1 | Status: SHIPPED | OUTPATIENT
Start: 2023-11-09 | End: 2023-12-05 | Stop reason: SDUPTHER

## 2023-11-09 RX ORDER — ISOSORBIDE MONONITRATE 60 MG/1
60 TABLET, EXTENDED RELEASE ORAL DAILY
Qty: 90 TABLET | Refills: 1 | Status: SHIPPED | OUTPATIENT
Start: 2023-11-09

## 2023-12-05 DIAGNOSIS — E10.69 TYPE 1 DIABETES MELLITUS WITH OTHER SPECIFIED COMPLICATION: ICD-10-CM

## 2023-12-06 RX ORDER — METOPROLOL SUCCINATE 25 MG/1
25 TABLET, EXTENDED RELEASE ORAL DAILY
Qty: 90 TABLET | Refills: 1 | Status: SHIPPED | OUTPATIENT
Start: 2023-12-06

## 2023-12-06 RX ORDER — BLOOD-GLUCOSE SENSOR
EACH MISCELLANEOUS
Qty: 6 EACH | Refills: 1 | Status: SHIPPED | OUTPATIENT
Start: 2023-12-06

## 2023-12-11 ENCOUNTER — OFFICE VISIT (OUTPATIENT)
Dept: DIABETES SERVICES | Facility: CLINIC | Age: 53
End: 2023-12-11
Payer: OTHER GOVERNMENT

## 2023-12-11 VITALS
BODY MASS INDEX: 30.51 KG/M2 | SYSTOLIC BLOOD PRESSURE: 118 MMHG | HEIGHT: 66 IN | WEIGHT: 189.81 LBS | RESPIRATION RATE: 16 BRPM | OXYGEN SATURATION: 96 % | HEART RATE: 80 BPM | DIASTOLIC BLOOD PRESSURE: 68 MMHG

## 2023-12-11 DIAGNOSIS — E11.43 DIABETIC GASTROPARESIS: ICD-10-CM

## 2023-12-11 DIAGNOSIS — E11.3559 STABLE PROLIFERATIVE DIABETIC RETINOPATHY ASSOCIATED WITH TYPE 2 DIABETES MELLITUS, UNSPECIFIED LATERALITY: ICD-10-CM

## 2023-12-11 DIAGNOSIS — E78.2 MIXED HYPERLIPIDEMIA: ICD-10-CM

## 2023-12-11 DIAGNOSIS — K31.84 DIABETIC GASTROPARESIS: ICD-10-CM

## 2023-12-11 DIAGNOSIS — I10 PRIMARY HYPERTENSION: ICD-10-CM

## 2023-12-11 DIAGNOSIS — I25.10 CORONARY ARTERY DISEASE INVOLVING NATIVE CORONARY ARTERY OF NATIVE HEART WITHOUT ANGINA PECTORIS: ICD-10-CM

## 2023-12-11 DIAGNOSIS — E10.69 TYPE 1 DIABETES MELLITUS WITH OTHER SPECIFIED COMPLICATION: Primary | ICD-10-CM

## 2023-12-11 LAB — GLUCOSE SERPL-MCNC: 254 MG/DL (ref 70–110)

## 2023-12-11 PROCEDURE — 99999PBSHW POCT GLUCOSE, HAND-HELD DEVICE: Mod: PBBFAC,,,

## 2023-12-11 PROCEDURE — 99215 OFFICE O/P EST HI 40 MIN: CPT | Mod: PBBFAC | Performed by: NURSE PRACTITIONER

## 2023-12-11 PROCEDURE — 99214 PR OFFICE/OUTPT VISIT, EST, LEVL IV, 30-39 MIN: ICD-10-PCS | Mod: S$PBB,,, | Performed by: NURSE PRACTITIONER

## 2023-12-11 PROCEDURE — 99999PBSHW POCT GLUCOSE, HAND-HELD DEVICE: ICD-10-PCS | Mod: PBBFAC,,,

## 2023-12-11 PROCEDURE — 99214 OFFICE O/P EST MOD 30 MIN: CPT | Mod: S$PBB,,, | Performed by: NURSE PRACTITIONER

## 2023-12-11 PROCEDURE — 82962 GLUCOSE BLOOD TEST: CPT | Mod: PBBFAC | Performed by: NURSE PRACTITIONER

## 2023-12-11 RX ORDER — INSULIN GLARGINE 300 U/ML
46 INJECTION, SOLUTION SUBCUTANEOUS DAILY
Qty: 9 PEN | Refills: 1 | Status: SHIPPED | OUTPATIENT
Start: 2023-12-11 | End: 2024-12-10

## 2023-12-11 RX ORDER — PEN NEEDLE, DIABETIC 31 GX5/16"
NEEDLE, DISPOSABLE MISCELLANEOUS
Qty: 400 EACH | Refills: 3 | Status: SHIPPED | OUTPATIENT
Start: 2023-12-11

## 2023-12-11 RX ORDER — INSULIN ASPART 100 [IU]/ML
INJECTION, SOLUTION INTRAVENOUS; SUBCUTANEOUS
Qty: 45 ML | Refills: 3 | Status: SHIPPED | OUTPATIENT
Start: 2023-12-11

## 2023-12-11 NOTE — PROGRESS NOTES
Subjective:         Patient ID: Talia Lawrence is a 53 y.o. female.  Patient's current PCP is No, Primary Doctor.     Chief Complaint: General Diabetes Follow-up (Here for routine follow up. Patient is using yesi to check glucose daily. )    HPI  Talia Lawrence is a 53 y.o. Black or  female presenting for an established visit for type 2 diabetes.   Has recently had LHC under the care of Dr Jones, had stent placed on 10/23/23    Received diabetes education:yes    At last visit :  No change in meds today.  Call for any hypoglycemia.  Change to yesi 3 per pt request. Keep alarms on to prevent hypoglycemia  Keep glucagon pen with you at all times.     Screening or Prevention Patient's value Goal    HgA1C Testing and Control   Hemoglobin A1C   Date Value Ref Range Status   06/13/2023 7.5 (H) 4.5 - 6.6 % Final     Comment:       Normal:               <5.7%  Pre-Diabetic:       5.7% to 6.4%  Diabetic:             >6.4%  Diabetic Goal:     <7%   02/21/2023 8.1 (A) 4.5 - 6.6 % Final   11/02/2022 7.5 (A) 4.5 - 6.6 % Final   08/02/2022 8.1 (A) 4.5 - 6.6 % Final       Annually/Less than 8%    Lipid profile Lab Results   Component Value Date    CHOL 160 06/13/2023    CHOL 172 04/28/2022    CHOL 170 04/22/2021     Lab Results   Component Value Date    HDL 64 (H) 06/13/2023    HDL 42 04/28/2022    HDL 43 04/22/2021     Lab Results   Component Value Date    LDLCALC 76 06/13/2023    LDLCALC 66 04/28/2022    LDLCALC 96 04/22/2021     Lab Results   Component Value Date    TRIG 98 06/13/2023    TRIG 321 (H) 04/28/2022    TRIG 157 (H) 04/22/2021       Lab Results   Component Value Date    CHOLHDL 2.5 06/13/2023    CHOLHDL 4.1 04/28/2022    CHOLHDL 4.0 04/22/2021    Annually    CMP CMP  Sodium   Date Value Ref Range Status   06/13/2023 135 (L) 136 - 145 mmol/L Final     Potassium   Date Value Ref Range Status   06/13/2023 5.0 3.5 - 5.1 mmol/L Final     Chloride   Date Value Ref Range Status   06/13/2023  "103 98 - 107 mmol/L Final     CO2   Date Value Ref Range Status   06/13/2023 29 21 - 32 mmol/L Final     Glucose   Date Value Ref Range Status   06/13/2023 141 (H) 74 - 106 mg/dL Final     BUN   Date Value Ref Range Status   06/13/2023 17 7 - 18 mg/dL Final     Creatinine   Date Value Ref Range Status   06/13/2023 1.24 (H) 0.55 - 1.02 mg/dL Final     Calcium   Date Value Ref Range Status   06/13/2023 9.6 8.5 - 10.1 mg/dL Final     Total Protein   Date Value Ref Range Status   06/13/2023 7.4 6.4 - 8.2 g/dL Final     Albumin   Date Value Ref Range Status   06/13/2023 3.9 3.5 - 5.0 g/dL Final     Bilirubin, Total   Date Value Ref Range Status   06/13/2023 0.4 >0.0 - 1.2 mg/dL Final     Alk Phos   Date Value Ref Range Status   06/13/2023 213 (H) 41 - 108 U/L Final     AST   Date Value Ref Range Status   06/13/2023 21 15 - 37 U/L Final     ALT   Date Value Ref Range Status   06/13/2023 24 13 - 56 U/L Final     Anion Gap   Date Value Ref Range Status   06/13/2023 8 7 - 16 mmol/L Final     eGFR   Date Value Ref Range Status   06/13/2023 53 (L) >=60 mL/min/1.73m2 Final     Annually    Microalbumin  Lab Results   Component Value Date    MICROALBUR <0.5 06/13/2023     Annually     LDL control Lab Results   Component Value Date    LDLCALC 76 06/13/2023    Annually/Less than 100 mg/dl     Nephropathy screening No results found for: "MICALBCREAT"  Lab Results   Component Value Date    PROTEINUA 70 (A) 11/13/2022    Annually    Blood pressure BP Readings from Last 1 Encounters:   09/28/23 112/66    Less than 140/90    Dilated retinal exam kendall NEWMAN Annually    Foot exam   : 06/13/2023 Annually              Past failed treatment include: none    Blood glucose testing is performed regularly. Patient is testing 4+with CGM times per day.        Any episodes of hypoglycemia? denies    Complications related to diabetes: nephropathy, cardiovascular disease, and peripheral vascular disease    Her blood sugar in the clinic today was: "   Lab Results   Component Value Date    POCGLU 117 (A) 06/13/2023           STANDARDS OF CARE  Ace/Arb:on ACE,  had cough with lisinpril but not with lotrel  Statin:lipitor 40mg         Review of Systems   Constitutional:  Negative for activity change, appetite change, diaphoresis and fatigue.   HENT:  Negative for nasal congestion, facial swelling and sinus pressure/congestion.    Eyes:  Negative for visual disturbance.   Respiratory:  Negative for shortness of breath and wheezing.    Cardiovascular:  Negative for chest pain and leg swelling.   Gastrointestinal:  Negative for constipation, diarrhea, nausea and vomiting.   Endocrine: Negative for polydipsia, polyphagia and polyuria.   Genitourinary:  Negative for dysuria, frequency and urgency.   Musculoskeletal:  Negative for gait problem and myalgias.   Integumentary:  Negative for color change, rash and wound.   Neurological:  Negative for dizziness, syncope, weakness, headaches and coordination difficulties.   Hematological:  Does not bruise/bleed easily.   Psychiatric/Behavioral:  Negative for self-injury, sleep disturbance and suicidal ideas. The patient is not nervous/anxious.         Objective:      There were no vitals filed for this visit.  Physical Exam  Vitals and nursing note reviewed.   Constitutional:       Appearance: Normal appearance.   HENT:      Head: Normocephalic.   Cardiovascular:      Rate and Rhythm: Normal rate and regular rhythm.      Heart sounds: Normal heart sounds.   Pulmonary:      Effort: Pulmonary effort is normal.      Breath sounds: Normal breath sounds.   Musculoskeletal:         General: Normal range of motion.      Right lower leg: No edema.      Left lower leg: No edema.   Skin:     General: Skin is warm and dry.   Neurological:      General: No focal deficit present.      Mental Status: She is alert and oriented to person, place, and time.   Psychiatric:         Mood and Affect: Mood normal.         Behavior: Behavior normal.          Thought Content: Thought content normal.         Judgment: Judgment normal.        Assessment/Plan   1. Type 1 diabetes mellitus with other specified complication  Continue with present meds  Lifestyle modifications encouraged    -     POCT Glucose, Hand-Held Device    2. Diabetic gastroparesis  Gastroparesis diet encouraged     3. Mixed hyperlipidemia  Continue statin    4. Primary hypertension  On ACE    5. Stable proliferative diabetic retinopathy associated with type 2 diabetes mellitus, unspecified laterality      6. Coronary artery disease involving native coronary artery of native heart without angina pectoris  See cardiology as scheduled           - Follow up: 3 months      I spent a total of 18 minutes on the day of the visit.This includes face to face time and non-face to face time preparing to see the patient (eg, review of tests), documenting clinical information in the electronic record, independently interpreting results and communicating results to the patient.    Autumn Rowland FNP-BC ADM-BC  [facility]

## 2024-03-18 ENCOUNTER — HOSPITAL ENCOUNTER (OUTPATIENT)
Dept: RADIOLOGY | Facility: HOSPITAL | Age: 54
Discharge: HOME | End: 2024-03-18
Attending: NURSE PRACTITIONER
Payer: OTHER GOVERNMENT

## 2024-03-18 DIAGNOSIS — R22.0 INTRACRANIAL SWELLING: ICD-10-CM

## 2024-03-18 DIAGNOSIS — J34.89 OVERDEVELOPMENT OF NASAL BONES: ICD-10-CM

## 2024-03-18 DIAGNOSIS — J34.89 OVERDEVELOPMENT OF NASAL BONES: Primary | ICD-10-CM

## 2024-03-18 PROCEDURE — 70220 X-RAY EXAM OF SINUSES: CPT | Mod: TC

## 2024-03-28 ENCOUNTER — OFFICE VISIT (OUTPATIENT)
Dept: NEUROLOGY | Facility: CLINIC | Age: 54
End: 2024-03-28
Payer: OTHER GOVERNMENT

## 2024-03-28 VITALS
DIASTOLIC BLOOD PRESSURE: 56 MMHG | WEIGHT: 192 LBS | HEART RATE: 72 BPM | SYSTOLIC BLOOD PRESSURE: 135 MMHG | HEIGHT: 66 IN | OXYGEN SATURATION: 98 % | BODY MASS INDEX: 30.86 KG/M2

## 2024-03-28 DIAGNOSIS — G43.709 CHRONIC MIGRAINE WITHOUT AURA WITHOUT STATUS MIGRAINOSUS, NOT INTRACTABLE: Primary | ICD-10-CM

## 2024-03-28 PROCEDURE — 99215 OFFICE O/P EST HI 40 MIN: CPT | Mod: PBBFAC | Performed by: NURSE PRACTITIONER

## 2024-03-28 PROCEDURE — 99212 OFFICE O/P EST SF 10 MIN: CPT | Mod: S$PBB,,, | Performed by: NURSE PRACTITIONER

## 2024-03-28 NOTE — PROGRESS NOTES
"    Subjective:       Patient ID: Talia Lawrence is a 53 y.o. female     Chief Complaint:    Chief Complaint   Patient presents with    Follow-up        Allergies:  Lisinopril and Lyrica [pregabalin]    Current Medications:    Outpatient Encounter Medications as of 3/28/2024   Medication Sig Dispense Refill    albuterol (PROVENTIL/VENTOLIN HFA) 90 mcg/actuation inhaler Inhale 2 puffs into the lungs every 6 (six) hours as needed for Wheezing. Rescue 18 g 3    amLODIPine-benazepriL (LOTREL) 5-40 mg per capsule Take 1 capsule by mouth once daily. amlodipine 5 mg-benazepril 40 mg capsule   TAKE ONE CAPSULE BY MOUTH EVERY DAY    amlodipine 5 mg-benazepril 40 mg capsule   TAKE ONE CAPSULE BY MOUTH EVERY DAY 90 capsule 3    aspirin (ECOTRIN) 81 MG EC tablet Take 81 mg by mouth once daily.      atogepant (QULIPTA) 60 mg Tab Take 60 mg by mouth once daily. 90 tablet 3    atorvastatin (LIPITOR) 40 MG tablet Take 1 tablet (40 mg total) by mouth every evening. 90 tablet 3    BD ULTRA-FINE CARLINE PEN NEEDLE 32 gauge x 5/32" Ndle Use to inject insulin 4 times daily. 400 each 3    blood sugar diagnostic (BLOOD GLUCOSE TEST) Strp To check blood sugar 4 times daily 400 each 3    blood-glucose meter (TRUE METRIX GLUCOSE METER MISC) by Misc.(Non-Drug; Combo Route) route. Use as directed      blood-glucose sensor (FREESTYLE CARLOS 3 SENSOR) Deborah Use to monitor glucose and change every 14 days as directed. 6 each 1    brimonidine 0.2% (ALPHAGAN) 0.2 % Drop Place 1 drop into both eyes 2 (two) times daily.      cholecalciferol, vitamin D3, 125 mcg (5,000 unit) Tab Take 5,000 Units by mouth once daily.      clopidogreL (PLAVIX) 75 mg tablet Take 75 mg by mouth once daily.      dapagliflozin propanediol (FARXIGA ORAL) Take by mouth.      dorzolamide-timolol 2-0.5% (COSOPT) 22.3-6.8 mg/mL ophthalmic solution instill one drop in affected eye twice a day.      flash glucose scanning reader (FREESTYLE CARLOS 2 READER) [facility] Use as directed. 1 " each 0    fluticasone propionate (FLONASE) 50 mcg/actuation nasal spray 2 sprays by Each Nostril route once daily.      glucagon HCL (GLUCAGON, HCL, EMERGENCY KIT) 1 mg SolR Inject 1 each as directed as needed (hypoglycemia unsafe to swallow). Use as directed for hypoglycemia 1 each 1    insulin aspart U-100 (NOVOLOG FLEXPEN U-100 INSULIN) 100 unit/mL (3 mL) InPn pen Inject sq following sliding scale not to exceed 50 units daily 45 mL 3    insulin glargine, TOUJEO, (TOUJEO SOLOSTAR U-300 INSULIN) 300 unit/mL (1.5 mL) InPn pen Inject 46 Units into the skin once daily. 9 pen 1    isosorbide mononitrate (IMDUR) 60 MG 24 hr tablet Take 1 tablet (60 mg total) by mouth once daily. 90 tablet 1    lancets 31 gauge Misc 1 lancet  by Misc.(Non-Drug; Combo Route) route 4 (four) times daily. 400 each 3    metoprolol succinate (TOPROL-XL) 25 MG 24 hr tablet Take 1 tablet (25 mg total) by mouth once daily. 90 tablet 1    promethazine (PHENERGAN) 25 MG tablet TAKE ONE TABLET BY MOUTH EVERY 8 HOURS AS NEEDED FOR MIGRAINE. DO NOT EXCEED MORE THAN 3 DAYS OF THE WEEK 20 tablet 1    fluticasone-salmeterol diskus inhaler 100-50 mcg Inhale 1 puff into the lungs 2 (two) times daily. Controller 180 each 3    nitroGLYCERIN (NITROSTAT) 0.4 MG SL tablet Place 1 tablet (0.4 mg total) under the tongue every 5 (five) minutes as needed for Chest pain. 25 tablet 3    pantoprazole (PROTONIX) 40 MG tablet Take 1 tablet (40 mg total) by mouth once daily. 90 tablet 3     No facility-administered encounter medications on file as of 3/28/2024.       History of Present Illness  54 y/o female following in neurology for migraine headaches.    Prior treated with topamax but developed glaucoma and it was stopped.  Already taking metoprolol for HTN, an prior on pamelor without reported improvement.  Was on emgality once monthly injections through headache clinic in [place], felt was working good, but said she was on too many shots already and wanted to  change.  Thus I put her on Qulipta 60mg daily.  At her followup she reported about 8 headache days a month, which was half of what she was reporting at her initial visit with us.  Her insurance approved nurte to use PRN.  So she is getting very good medication coverage.  She is reporting more sinus headache symptoms lately, but they have her on ear drops and antibiotic for this.    She has CAD, followed by Dr. Rosado, so she is not able to use any triptan medications.    Prior sleep apnea workup was found negative.           Review of Systems  Review of Systems   Constitutional:  Negative for diaphoresis and fever.   HENT:  Negative for congestion, hearing loss and tinnitus.    Eyes:  Negative for blurred vision, double vision, photophobia, discharge and redness.   Respiratory:  Negative for cough and shortness of breath.    Cardiovascular:  Negative for chest pain.   Gastrointestinal:  Negative for abdominal pain, nausea and vomiting.   Musculoskeletal:  Negative for back pain, joint pain, myalgias and neck pain.   Skin:  Negative for itching and rash.   Neurological:  Positive for headaches. Negative for dizziness, tremors, sensory change, speech change, focal weakness, seizures, loss of consciousness and weakness.   Psychiatric/Behavioral:  Negative for depression, hallucinations and memory loss. The patient does not have insomnia.    All other systems reviewed and are negative.     Objective:     NEUROLOGICAL EXAMINATION:     MENTAL STATUS   Oriented to person, place, and time.   Attention: normal. Concentration: normal.   Speech: speech is normal   Level of consciousness: alert  Knowledge: good and consistent with education.   Normal comprehension.     CRANIAL NERVES     CN II   Visual fields full to confrontation.   Visual acuity: normal  Right visual field deficit: none  Left visual field deficit: none     CN III, IV, VI   Pupils are equal, round, and reactive to light.  Extraocular motions are normal.    Right pupil: Size: 3 mm. Shape: regular. Reactivity: brisk. Consensual response: intact. Accommodation: intact.   Left pupil: Size: 3 mm. Shape: regular. Reactivity: brisk. Consensual response: intact. Accommodation: intact.   CN III: no CN III palsy  CN VI: no CN VI palsy  Nystagmus: none   Diplopia: none  Upgaze: normal  Downgaze: normal  Conjugate gaze: present  Vestibulo-ocular reflex: present    CN V   Facial sensation intact.   Right facial sensation deficit: none  Left facial sensation deficit: none  Right corneal reflex: normal  Left corneal reflex: normal    CN VII   Facial expression full, symmetric.   Right facial weakness: none  Left facial weakness: none  Right taste: normal  Left taste: normal    CN VIII   CN VIII normal.   Hearing: intact    CN IX, X   CN IX normal.   CN X normal.   Palate: symmetric    CN XI   CN XI normal.   Right sternocleidomastoid strength: normal  Left sternocleidomastoid strength: normal  Right trapezius strength: normal  Left trapezius strength: normal    CN XII   CN XII normal.   Tongue: not atrophic  Fasciculations: absent  Tongue deviation: none    MOTOR EXAM   Muscle bulk: normal  Overall muscle tone: normal  Right arm tone: normal  Left arm tone: normal  Right arm pronator drift: absent  Left arm pronator drift: absent  Right leg tone: normal  Left leg tone: normal    Strength   Right neck flexion: 5/5  Left neck flexion: 5/5  Right neck extension: 5/5  Left neck extension: 5/5  Right deltoid: 5/5  Left deltoid: 5/5  Right biceps: 5/5  Left biceps: 5/5  Right triceps: 5/5  Left triceps: 5/5  Right wrist flexion: 5/5  Left wrist flexion: 5/5  Right wrist extension: 5/5  Left wrist extension: 5/5  Right interossei: 5/5  Left interossei: 5/5  Right iliopsoas: 5/5  Left iliopsoas: 5/5  Right quadriceps: 5/5  Left quadriceps: 5/5  Right hamstrin/5  Left hamstrin/5  Right anterior tibial: 5/5  Left anterior tibial: 5/5  Right posterior tibial: 5/5  Left posterior  tibial: 5/5  Right gastroc: 5/5  Left gastroc: 5/5    REFLEXES     Reflexes   Right brachioradialis: 2+  Left brachioradialis: 2+  Right biceps: 2+  Left biceps: 2+  Right triceps: 2+  Left triceps: 2+  Right patellar: 2+  Left patellar: 2+  Right achilles: 2+  Left achilles: 2+  Right plantar: normal  Left plantar: normal  Right Avilez: absent  Left Avilez: absent  Right ankle clonus: absent  Left ankle clonus: absent  Right pendular knee jerk: absent  Left pendular knee jerk: absent    SENSORY EXAM   Light touch normal.   Right arm light touch: normal  Left arm light touch: normal  Right leg light touch: normal  Left leg light touch: normal  Vibration normal.   Right arm vibration: normal  Left arm vibration: normal  Right leg vibration: normal  Left leg vibration: normal  Proprioception normal.   Right arm proprioception: normal  Left arm proprioception: normal  Right leg proprioception: normal  Left leg proprioception: normal  Pinprick normal.   Right arm pinprick: normal  Left arm pinprick: normal  Right leg pinprick: normal  Left leg pinprick: normal  Graphesthesia: normal  Romberg: negative  Stereognosis: normal    GAIT AND COORDINATION     Gait  Gait: normal     Coordination   Finger to nose coordination: normal  Heel to shin coordination: normal  Tandem walking coordination: normal    Tremor   Resting tremor: absent  Intention tremor: absent  Action tremor: absent       Physical Exam  Vitals and nursing note reviewed.   Constitutional:       Appearance: Normal appearance.   HENT:      Head: Normocephalic.   Eyes:      Extraocular Movements: Extraocular movements intact and EOM normal.      Pupils: Pupils are equal, round, and reactive to light.   Cardiovascular:      Rate and Rhythm: Normal rate and regular rhythm.   Pulmonary:      Effort: Pulmonary effort is normal.      Breath sounds: Normal breath sounds.   Musculoskeletal:         General: No swelling or tenderness. Normal range of motion.       Cervical back: Normal range of motion and neck supple.      Right lower leg: No edema.      Left lower leg: No edema.   Skin:     General: Skin is warm and dry.      Coloration: Skin is not jaundiced.      Findings: No rash.   Neurological:      General: No focal deficit present.      Mental Status: She is alert and oriented to person, place, and time.      GCS: GCS eye subscore is 4. GCS verbal subscore is 5. GCS motor subscore is 6.      Cranial Nerves: No cranial nerve deficit.      Sensory: No sensory deficit.      Motor: Motor function is intact. No weakness.      Coordination: Coordination is intact. Coordination normal. Finger-Nose-Finger Test, Heel to Shin Test and Romberg Test normal.      Gait: Gait is intact. Gait and tandem walk normal.      Deep Tendon Reflexes: Reflexes normal.      Reflex Scores:       Tricep reflexes are 2+ on the right side and 2+ on the left side.       Bicep reflexes are 2+ on the right side and 2+ on the left side.       Brachioradialis reflexes are 2+ on the right side and 2+ on the left side.       Patellar reflexes are 2+ on the right side and 2+ on the left side.       Achilles reflexes are 2+ on the right side and 2+ on the left side.  Psychiatric:         Mood and Affect: Mood normal.         Speech: Speech normal.         Behavior: Behavior normal.          Assessment:     Problem List Items Addressed This Visit          Neuro    Chronic migraine without aura - Primary                Primary Diagnosis and ICD10  Chronic migraine without aura without status migrainosus, not intractable [G43.709]    Plan:     Patient Instructions   Continue qulipta 60mg daily  Phenergan as needed, but not more than 2 days of the week  Continue nurtec as directed as needed    There are no discontinued medications.        Requested Prescriptions      No prescriptions requested or ordered in this encounter       No orders of the defined types were placed in this encounter.

## 2024-07-09 ENCOUNTER — OFFICE VISIT (OUTPATIENT)
Dept: NEUROLOGY | Facility: CLINIC | Age: 54
End: 2024-07-09
Payer: OTHER GOVERNMENT

## 2024-07-09 VITALS
RESPIRATION RATE: 18 BRPM | SYSTOLIC BLOOD PRESSURE: 172 MMHG | WEIGHT: 197 LBS | HEIGHT: 66 IN | OXYGEN SATURATION: 98 % | DIASTOLIC BLOOD PRESSURE: 67 MMHG | HEART RATE: 74 BPM | BODY MASS INDEX: 31.66 KG/M2

## 2024-07-09 DIAGNOSIS — G44.201 ACUTE INTRACTABLE TENSION-TYPE HEADACHE: ICD-10-CM

## 2024-07-09 DIAGNOSIS — G43.709 CHRONIC MIGRAINE WITHOUT AURA WITHOUT STATUS MIGRAINOSUS, NOT INTRACTABLE: Primary | ICD-10-CM

## 2024-07-09 PROCEDURE — 99999 PR PBB SHADOW E&M-EST. PATIENT-LVL V: CPT | Mod: PBBFAC,,, | Performed by: NURSE PRACTITIONER

## 2024-07-09 PROCEDURE — 99215 OFFICE O/P EST HI 40 MIN: CPT | Mod: PBBFAC | Performed by: NURSE PRACTITIONER

## 2024-07-09 PROCEDURE — 99214 OFFICE O/P EST MOD 30 MIN: CPT | Mod: S$PBB,,, | Performed by: NURSE PRACTITIONER

## 2024-07-09 RX ORDER — GABAPENTIN 100 MG/1
CAPSULE ORAL
Qty: 120 CAPSULE | Refills: 11 | Status: SHIPPED | OUTPATIENT
Start: 2024-07-09

## 2024-07-09 RX ORDER — MONTELUKAST SODIUM 10 MG/1
10 TABLET ORAL
COMMUNITY
Start: 2024-03-22

## 2024-07-09 NOTE — PATIENT INSTRUCTIONS
Continue qulipta 60mg daily  Phenergan as needed, but not more than 2 days of the week  Continue nurtec as directed as needed  MRI brain  Labs as ordered  Add neurontin tapering as instructed to 200mg twice daily

## 2024-07-09 NOTE — PROGRESS NOTES
"    Subjective:       Patient ID: Talia Lawrence is a 54 y.o. female     Chief Complaint:    No chief complaint on file.       Allergies:  Lisinopril and Lyrica [pregabalin]    Current Medications:    Outpatient Encounter Medications as of 7/9/2024   Medication Sig Dispense Refill    albuterol (PROVENTIL/VENTOLIN HFA) 90 mcg/actuation inhaler Inhale 2 puffs into the lungs every 6 (six) hours as needed for Wheezing. Rescue 18 g 3    amLODIPine-benazepriL (LOTREL) 5-40 mg per capsule Take 1 capsule by mouth once daily. amlodipine 5 mg-benazepril 40 mg capsule   TAKE ONE CAPSULE BY MOUTH EVERY DAY    amlodipine 5 mg-benazepril 40 mg capsule   TAKE ONE CAPSULE BY MOUTH EVERY DAY 90 capsule 3    aspirin (ECOTRIN) 81 MG EC tablet Take 81 mg by mouth once daily.      atogepant (QULIPTA) 60 mg Tab Take 60 mg by mouth once daily. 90 tablet 3    atorvastatin (LIPITOR) 40 MG tablet Take 1 tablet (40 mg total) by mouth every evening. 90 tablet 3    BD ULTRA-FINE CARLINE PEN NEEDLE 32 gauge x 5/32" Ndle Use to inject insulin 4 times daily. 400 each 3    blood sugar diagnostic (BLOOD GLUCOSE TEST) Strp To check blood sugar 4 times daily 400 each 3    blood-glucose meter (TRUE METRIX GLUCOSE METER MISC) by Misc.(Non-Drug; Combo Route) route. Use as directed      blood-glucose sensor (FREESTYLE CARLOS 3 SENSOR) Deborah Use to monitor glucose and change every 14 days as directed. 6 each 1    brimonidine 0.2% (ALPHAGAN) 0.2 % Drop Place 1 drop into both eyes 2 (two) times daily.      cholecalciferol, vitamin D3, 125 mcg (5,000 unit) Tab Take 5,000 Units by mouth once daily.      clopidogreL (PLAVIX) 75 mg tablet Take 75 mg by mouth once daily.      dapagliflozin propanediol (FARXIGA ORAL) Take by mouth.      dorzolamide-timolol 2-0.5% (COSOPT) 22.3-6.8 mg/mL ophthalmic solution instill one drop in affected eye twice a day.      flash glucose scanning reader (FREESTYLE CARLOS 2 READER) [employer] [facility] Use as directed. 1 each 0    " fluticasone propionate (FLONASE) 50 mcg/actuation nasal spray 2 sprays by Each Nostril route once daily.      fluticasone-salmeterol diskus inhaler 100-50 mcg Inhale 1 puff into the lungs 2 (two) times daily. Controller 180 each 3    glucagon HCL (GLUCAGON, HCL, EMERGENCY KIT) 1 mg SolR Inject 1 each as directed as needed (hypoglycemia unsafe to swallow). Use as directed for hypoglycemia 1 each 1    insulin aspart U-100 (NOVOLOG FLEXPEN U-100 INSULIN) 100 unit/mL (3 mL) InPn pen Inject sq following sliding scale not to exceed 50 units daily 45 mL 3    insulin glargine, TOUJEO, (TOUJEO SOLOSTAR U-300 INSULIN) 300 unit/mL (1.5 mL) InPn pen Inject 46 Units into the skin once daily. 9 pen 1    isosorbide mononitrate (IMDUR) 60 MG 24 hr tablet Take 1 tablet (60 mg total) by mouth once daily. 90 tablet 1    lancets 31 gauge Misc 1 lancet  by Misc.(Non-Drug; Combo Route) route 4 (four) times daily. 400 each 3    metoprolol succinate (TOPROL-XL) 25 MG 24 hr tablet Take 1 tablet (25 mg total) by mouth once daily. 90 tablet 1    nitroGLYCERIN (NITROSTAT) 0.4 MG SL tablet Place 1 tablet (0.4 mg total) under the tongue every 5 (five) minutes as needed for Chest pain. 25 tablet 3    pantoprazole (PROTONIX) 40 MG tablet Take 1 tablet (40 mg total) by mouth once daily. 90 tablet 3    promethazine (PHENERGAN) 25 MG tablet TAKE ONE TABLET BY MOUTH EVERY 8 HOURS AS NEEDED FOR MIGRAINE. DO NOT EXCEED MORE THAN 3 DAYS OF THE WEEK 20 tablet 1     No facility-administered encounter medications on file as of 7/9/2024.       History of Present Illness  55 y/o female following in neurology for migraine headaches.    Prior treated with topamax but developed glaucoma and it was stopped.  Already taking metoprolol for HTN, an prior on pamelor without reported improvement.  Was on emgality once monthly injections through headache clinic in [place], felt was working good, but said she was on too many shots already and wanted to change  "to an oral option.  Thus I put her on Qulipta 60mg daily.  At her followup she reported about 8 headache days a month, which was half of what she was reporting at her initial visit with us.  Her insurance approved nurtec to use PRN so she essentially has CGRP medication for preventive and abortive treatment.  So she is getting very good medication coverage.    Last seen in March of 2024 and doing well on this treatment thus we setup 6 month follow-up, but she is on schedule today reporting a "constant" migraine?  She says over the last 2-3 week she has been having a very regular headache, bilateral frontal, but also more around her right eye, also indicating her bilateral temporal regions as well.  States not out of the qulipta, and that nurtec not eliminating the headaches.  This is not typical of her headaches in the past, so this is worse.  I would like to get head imaging to ensure no acute changes.  Will obtain marker labs including ESR and CRP given the bilateral temporal symptoms.  Will start her with low dosing neurontin and taper up.    She has CAD, followed by Dr. Rosado, so she is not able to use any triptan medications.    Prior sleep apnea workup was found negative.         Review of Systems  Review of Systems   Constitutional:  Negative for diaphoresis and fever.   HENT:  Negative for congestion, hearing loss and tinnitus.    Eyes:  Negative for blurred vision, double vision, photophobia, discharge and redness.   Respiratory:  Negative for cough and shortness of breath.    Cardiovascular:  Negative for chest pain.   Gastrointestinal:  Negative for abdominal pain, nausea and vomiting.   Musculoskeletal:  Negative for back pain, joint pain, myalgias and neck pain.   Skin:  Negative for itching and rash.   Neurological:  Positive for headaches. Negative for dizziness, tremors, sensory change, speech change, focal weakness, seizures, loss of consciousness and weakness.   Psychiatric/Behavioral:  " Negative for depression, hallucinations and memory loss. The patient does not have insomnia.    All other systems reviewed and are negative.     Objective:     NEUROLOGICAL EXAMINATION:     MENTAL STATUS   Oriented to person, place, and time.   Attention: normal. Concentration: normal.   Speech: speech is normal   Level of consciousness: alert  Knowledge: good and consistent with education.   Normal comprehension.     CRANIAL NERVES     CN II   Visual fields full to confrontation.   Visual acuity: normal  Right visual field deficit: none  Left visual field deficit: none     CN III, IV, VI   Pupils are equal, round, and reactive to light.  Extraocular motions are normal.   Right pupil: Size: 3 mm. Shape: regular. Reactivity: brisk. Consensual response: intact. Accommodation: intact.   Left pupil: Size: 3 mm. Shape: regular. Reactivity: brisk. Consensual response: intact. Accommodation: intact.   CN III: no CN III palsy  CN VI: no CN VI palsy  Nystagmus: none   Diplopia: none  Upgaze: normal  Downgaze: normal  Conjugate gaze: present  Vestibulo-ocular reflex: present    CN V   Facial sensation intact.   Right facial sensation deficit: none  Left facial sensation deficit: none  Right corneal reflex: normal  Left corneal reflex: normal    CN VII   Facial expression full, symmetric.   Right facial weakness: none  Left facial weakness: none  Right taste: normal  Left taste: normal    CN VIII   CN VIII normal.   Hearing: intact    CN IX, X   CN IX normal.   CN X normal.   Palate: symmetric    CN XI   CN XI normal.   Right sternocleidomastoid strength: normal  Left sternocleidomastoid strength: normal  Right trapezius strength: normal  Left trapezius strength: normal    CN XII   CN XII normal.   Tongue: not atrophic  Fasciculations: absent  Tongue deviation: none    MOTOR EXAM   Muscle bulk: normal  Overall muscle tone: normal  Right arm tone: normal  Left arm tone: normal  Right arm pronator drift: absent  Left arm  pronator drift: absent  Right leg tone: normal  Left leg tone: normal    Strength   Right neck flexion: 5/5  Left neck flexion: 5/5  Right neck extension: 5/5  Left neck extension: 5/5  Right deltoid: 5/5  Left deltoid: 5/5  Right biceps: 5/5  Left biceps: 5/5  Right triceps: 5  Left triceps: /  Right wrist flexion:   Left wrist flexion: /  Right wrist extension: /  Left wrist extension:   Right interossei:   Left interossei:   Right iliopsoas:   Left iliopsoas:   Right quadriceps:   Left quadriceps:   Right hamstrin/5  Left hamstrin/5  Right anterior tibial:   Left anterior tibial:   Right posterior tibial:   Left posterior tibial:   Right gastroc:   Left gastroc:     REFLEXES     Reflexes   Right brachioradialis: 2+  Left brachioradialis: 2+  Right biceps: 2+  Left biceps: 2+  Right triceps: 2+  Left triceps: 2+  Right patellar: 2+  Left patellar: 2+  Right achilles: 2+  Left achilles: 2+  Right plantar: normal  Left plantar: normal  Right Avilez: absent  Left Avilez: absent  Right ankle clonus: absent  Left ankle clonus: absent  Right pendular knee jerk: absent  Left pendular knee jerk: absent    SENSORY EXAM   Light touch normal.   Right arm light touch: normal  Left arm light touch: normal  Right leg light touch: normal  Left leg light touch: normal  Vibration normal.   Right arm vibration: normal  Left arm vibration: normal  Right leg vibration: normal  Left leg vibration: normal  Proprioception normal.   Right arm proprioception: normal  Left arm proprioception: normal  Right leg proprioception: normal  Left leg proprioception: normal  Pinprick normal.   Right arm pinprick: normal  Left arm pinprick: normal  Right leg pinprick: normal  Left leg pinprick: normal  Graphesthesia: normal  Romberg: negative  Stereognosis: normal    GAIT AND COORDINATION     Gait  Gait: normal     Coordination   Finger to nose coordination: normal  Heel to shin  coordination: normal  Tandem walking coordination: normal    Tremor   Resting tremor: absent  Intention tremor: absent  Action tremor: absent       Physical Exam  Vitals and nursing note reviewed.   Constitutional:       Appearance: Normal appearance.   HENT:      Head: Normocephalic.   Eyes:      Extraocular Movements: Extraocular movements intact and EOM normal.      Pupils: Pupils are equal, round, and reactive to light.   Cardiovascular:      Rate and Rhythm: Normal rate and regular rhythm.   Pulmonary:      Effort: Pulmonary effort is normal.      Breath sounds: Normal breath sounds.   Musculoskeletal:         General: No swelling or tenderness. Normal range of motion.      Cervical back: Normal range of motion and neck supple.      Right lower leg: No edema.      Left lower leg: No edema.   Skin:     General: Skin is warm and dry.      Coloration: Skin is not jaundiced.      Findings: No rash.   Neurological:      General: No focal deficit present.      Mental Status: She is alert and oriented to person, place, and time.      GCS: GCS eye subscore is 4. GCS verbal subscore is 5. GCS motor subscore is 6.      Cranial Nerves: No cranial nerve deficit.      Sensory: No sensory deficit.      Motor: Motor function is intact. No weakness.      Coordination: Coordination is intact. Coordination normal. Finger-Nose-Finger Test, Heel to Shin Test and Romberg Test normal.      Gait: Gait is intact. Gait and tandem walk normal.      Deep Tendon Reflexes: Reflexes normal.      Reflex Scores:       Tricep reflexes are 2+ on the right side and 2+ on the left side.       Bicep reflexes are 2+ on the right side and 2+ on the left side.       Brachioradialis reflexes are 2+ on the right side and 2+ on the left side.       Patellar reflexes are 2+ on the right side and 2+ on the left side.       Achilles reflexes are 2+ on the right side and 2+ on the left side.  Psychiatric:         Mood and Affect: Mood normal.          Speech: Speech normal.         Behavior: Behavior normal.        Assessment:     Problem List Items Addressed This Visit    None                 Primary Diagnosis and ICD10  No primary diagnosis found.    Plan:     There are no Patient Instructions on file for this visit.    There are no discontinued medications.        Requested Prescriptions      No prescriptions requested or ordered in this encounter       No orders of the defined types were placed in this encounter.

## 2024-07-22 DIAGNOSIS — H53.2 DIPLOPIA: Primary | ICD-10-CM

## 2024-07-24 ENCOUNTER — TELEPHONE (OUTPATIENT)
Dept: NEUROLOGY | Facility: CLINIC | Age: 54
End: 2024-07-24
Payer: OTHER GOVERNMENT

## 2024-07-24 ENCOUNTER — HOSPITAL ENCOUNTER (OUTPATIENT)
Dept: RADIOLOGY | Facility: HOSPITAL | Age: 54
Discharge: HOME | End: 2024-07-24
Attending: NURSE PRACTITIONER
Payer: OTHER GOVERNMENT

## 2024-07-24 DIAGNOSIS — G44.201 ACUTE INTRACTABLE TENSION-TYPE HEADACHE: ICD-10-CM

## 2024-07-24 PROCEDURE — 25500020 PHARM REV CODE 255: Performed by: NURSE PRACTITIONER

## 2024-07-24 PROCEDURE — 70553 MRI BRAIN STEM W/O & W/DYE: CPT | Mod: 26,,, | Performed by: RADIOLOGY

## 2024-07-24 PROCEDURE — A9577 INJ MULTIHANCE: HCPCS | Performed by: NURSE PRACTITIONER

## 2024-07-24 PROCEDURE — 70553 MRI BRAIN STEM W/O & W/DYE: CPT | Mod: TC

## 2024-07-24 RX ADMIN — GADOBENATE DIMEGLUMINE 18 ML: 529 INJECTION, SOLUTION INTRAVENOUS at 10:07

## 2024-07-24 NOTE — TELEPHONE ENCOUNTER
----- Message from STEPHANIE Quezada sent at 7/24/2024  3:30 PM CDT -----  MRI brain did not show any acute abnormalities, no strokes, tumors or mass was appreciated.   Rachel Cage [address] [facility]. [address]  Dept: [phone]  Dept Fax: [phone]    Visit type: Established patient    Reason for Visit: ED Follow-up (ER follow up from yesterday), Health Maintenance (potassium/creatinine), and Discuss Medications (anemic in hospital admission yesterday)         Assessment and Plan       1. GI bleed due to NSAIDs  -     CBC with Auto Differential; Future  -     Comprehensive Metabolic Panel; Future  -     External Referral To Hematology Oncology  2. Blood loss anemia  -     CBC with Auto Differential; Future  -     Comprehensive Metabolic Panel; Future  -     External Referral To Hematology Oncology  3. Congestive heart failure, unspecified HF chronicity, unspecified heart failure type ([facility] [address]  4. At high risk for falls    Discussed had GI work up and was taken off of NSAIDS, and now on carafate  Still having anemia post covid, which discussed can also come from his CKD  Will re eval labs in a couple of weeks and send to [facility] for consideration of iron infusions- they would like to either go to [place] or [facility] for this  Is usually at baseline of 8 for his hemoglobin  The Er notes from 4/11 reviewed and he was negative for occult blood yesterday as well   Continue lasix for CHF   Reviewed risk and benefit of discontinuing versus continuing lopressor and zocor for his age - will continue both at this time   Continue using walker for ambulation   Return for 2 week labs, and then 1 month fu .        Subjective       WAS in ER yesterday for CKD, anemia-  Is taking iron supplement 325 mg every other day, has been on it since diagnosed with covid a couple of months ago   Was on NSAIDS and these were discontinued, and is now on carafate  Is wondering if they can consider doing iron infusions again  Daughter that is with him today states that hospital refused to do infusion unless H was under 7  Has fatigue  Uses walker with ambulation Want medication review to see if anything is not needed. Tachycardia with trying to come off beta blocker, so this was not discontinued    ER notes reviewed      \"ED Course / Medical Decision Making:     Because of the patient's age and his symptoms he did have a rather thorough work-up obtained. Because of his history of renal failure I did not do a CT with contrast. The plain CT came back showing multiple chronic findings but no acute pathology. Blood work although did have some abnormalities appear to be at his baseline with a hemoglobin today of 8.3 which is typically where he is been over the last 2 months. He is on ferrous sulfate twice a day. Guaiac test came back negative. No obvious bleeding or signs of GI bleed. No abdominal pain. His back pain got better with Tylenol. Although he does have some hyperglycemia there is no evidence of underlying metabolic acidosis. Lactate was little bit elevated to 3.4 however the patient clinically appears to be otherwise stable and has a normal white count with no left shift. His renal insufficiency is consistent words been in the past. I also did a screening BNP and troponin both of which were essentially normal as well. At which time he had his conversation with the patient. He states his back pain is feeling better after Tylenol. I also discussed with his son and his daughter at the bedside utilizing shared decision-making. Although his got some chronic abnormalities these are not inconsistent with his prior history nor with his age. There are risks associated with being admitted to this hospital or transfer to another hospital for admission. After discussion with his kids as well as the patient, they are all in agreement that he may be discharged home this evening. Medically if this were my father, I am in agreement with that as well.  Although he does have anemia, it is been consistent where he is been in the past he has no evidence of acute GI bleed at this time. Although he did have some diarrhea earlier today he does have a moderately formed stool within his depends. Vital signs otherwise remained stable as well. As I discussed with the patient as well as his children. He is to return immediately if he has any abdominal pain, any fever, increasing back pain, any unusual or new symptoms in his legs or any other concerns. Otherwise the patient was actually able to ambulate with his walker around the emergency department as he typically does. Clinical Impression:     1. Anemia, unspecified type   2. Bilateral low back pain without sciatica, unspecified chronicity   3. Renal insufficiency   4. Hyperglycemia   5. History of COVID-19\"         Review of Systems   Constitutional: Positive for fatigue. Negative for activity change, appetite change and fever. HENT: Positive for hearing loss. Negative for congestion, ear pain and rhinorrhea. Eyes: Negative for discharge and visual disturbance. Respiratory: Negative for cough, chest tightness and shortness of breath. Cardiovascular: Negative for chest pain and palpitations. Gastrointestinal: Positive for constipation (intermittent controlled with colace). Negative for abdominal pain, diarrhea and vomiting. Genitourinary: Negative for difficulty urinating and hematuria. Musculoskeletal: Positive for arthralgias, back pain, gait problem (uses walker), joint swelling and myalgias. Skin: Negative for rash. Neurological: Positive for weakness. Negative for dizziness, numbness and headaches. Psychiatric/Behavioral: The patient is not nervous/anxious.          Allergies   Allergen Reactions    Ramipril Shortness Of Breath    Diclofenac     Metformin And Related      Kidney function        Outpatient Medications Prior to Visit   Medication Sig Dispense Refill    POTASSIUM CHLORIDE ER PO       allopurinol (ZYLOPRIM) 100 MG tablet Take 1 tablet by mouth daily 90 tablet 1    furosemide (LASIX) 40 MG tablet Take 1 tablet by mouth 2 times daily 180 tablet 0    ferrous sulfate (FE TABS) 325 (65 Fe) MG EC tablet Take 1 tablet by mouth every other day 90 tablet 3    simvastatin (ZOCOR) 20 MG tablet Take 1 tablet by mouth nightly 30 tablet 5    sucralfate (CARAFATE) 1 GM tablet Take 1 tablet by mouth 4 times daily 120 tablet 0    albuterol sulfate  (90 Base) MCG/ACT inhaler INHALE 2 PUFFS BY MOUTH EVERY 4 HOURS AS NEEDED FOR WHEEZING      glyBURIDE (DIABETA) 5 MG tablet Take 1 tablet by mouth daily (with breakfast) 90 tablet 0    traMADol (ULTRAM) 50 MG tablet Take 1 tablet by mouth in the morning and at bedtime for 90 days. 60 tablet 2    metoprolol tartrate (LOPRESSOR) 50 MG tablet Take 1 tablet by mouth once daily 90 tablet 2    ipratropium-albuterol (DUONEB) 0.5-2.5 (3) MG/3ML SOLN nebulizer solution USE 3 ML IN NEBULIZER EVERY 6 HOURS (Patient taking differently: Take 1 vial by nebulization as needed ) 360 mL 0    acetaminophen (TYLENOL) 500 MG tablet Take 2 tablets by mouth 3 times daily      aspirin 81 MG EC tablet Take 81 mg by mouth daily      midodrine (PROAMATINE) 10 MG tablet TAKE 1 TABLET BY MOUTH TWICE DAILY      Docusate Sodium (COLACE PO) Take 100 mg by mouth 2 times daily       No facility-administered medications prior to visit. Past Medical History:   Diagnosis Date    Anemia 04/11/2022    Community acquired pneumonia of right lower lobe of lung 12/10/2016    Last Assessment & Plan:  Community-acquired pneumonia: Jose Jones feels much better and states that he is about 95% better. He is lungs sound good, his afebrile and his O2 saturations are good. Chest x-ray shows that the pneumonia is almost cleared. Plan: We will send him home on Zithromax and a prednisone tapering dose to take with food    COPD exacerbation ([facility] [address] 2/2/2020    Last Assessment & Plan:  COPD exacerbation: Sherri Huffman is feeling much better and he wants to go home. He is afebrile and lungs are clear.   I advised him to stay in and drink plenty of fluids and get plenty rest.    Hyperglycemia 2022    Influenza B 3/5/2020    Last Assessment & Plan:  Influenza B: Mr. Greta Shepard was diagnosed with influenza B so he will get Tamiflu 30 mg daily for 5 days. Orlin Link is feeling much better. Cold symptoms have resolved and cough is better. He has no questions no concerns and is ready to go home.  Renal insufficiency 2022    Sciatica 2022    Upper respiratory infection 2019        Social History     Tobacco Use    Smoking status: Former Smoker     Types: Cigarettes     Quit date: 1975     Years since quittin.3    Smokeless tobacco: Never Used   Substance Use Topics    Alcohol use: Not Currently        No past surgical history on file. Family History   Problem Relation Age of Onset    Heart Disease Mother     Heart Attack Mother     High Blood Pressure Mother     Heart Attack Sister     Cancer Brother         lung    Cancer Brother         lung    Heart Attack Brother        Objective       /68   Pulse 78   Temp 97.5 °F (36.4 °C) (Temporal)   Resp 18   Wt 175 lb 9.6 oz (79.7 kg)   SpO2 98%   BMI 30.14 kg/m²   Physical Exam  Constitutional:       General: He is not in acute distress. Appearance: He is well-developed. Interventions: He is not intubated. HENT:      Head: Normocephalic and atraumatic. Right Ear: External ear normal.      Left Ear: External ear normal.   Eyes:      General: Lids are normal.      Conjunctiva/sclera: Conjunctivae normal.   Pulmonary:      Effort: No tachypnea, bradypnea, prolonged expiration, respiratory distress or retractions. He is not intubated. Musculoskeletal:      Cervical back: Normal range of motion. Comments: Walker with ambulation    Skin:     Coloration: Skin is pale. Findings: No erythema or rash. Neurological:      Mental Status: He is alert and oriented to person, place, and time.    Psychiatric: Attention and Perception: Attention and perception normal.         Mood and Affect: Mood and affect normal.         Speech: Speech normal.         Behavior: Behavior normal. Behavior is cooperative. Thought Content: Thought content normal.         Cognition and Memory: Cognition and memory normal.         Judgment: Judgment normal.     Laboratory & Radiological Imaging (if done):     Labs Reviewed   NT PRO BNP - Abnormal; Notable for the following components:   Result Value   NT-Pro  (*)   All other components within normal limits   Narrative:   Pride Study Cut-offs  Rule In:  < /= 50 Years >450 pg/mL  51 Years - 75 Years >900 pg/mL  76 Years - 99 Years >1800 pg/mL  Rule Out:  All patients <300 pg/mL   CHEM 7 - Abnormal; Notable for the following components:   Potassium 3.4 (*)   Bicarbonate 20 (*)   Creatinine 1.87 (*)   Glucose 215 (*)   BUN 85 (*)   eGFR 30 (*)   BUN/Creatinine Ratio 45.5 (*)   All other components within normal limits   Narrative: The eGFR should be used for monitoring renal function only and not for medication dosing. HEPATIC FUNCTION PANEL - Abnormal; Notable for the following components: Total Protein 8.1 (*)   All other components within normal limits   URINALYSIS - Abnormal; Notable for the following components:   Bacteria, Urine Rare (*)   All other components within normal limits   Narrative:   Microscopic examination is performed on all urinalysis samples and only positive findings are reported. The test for blood on the chemical analytic portion of urinalysis may also be positive due to hemoglobinuria and myoglobinuria and if red blood cells are present they are quantified by microscopic examination.    LACTIC ACID, PLASMA - Abnormal; Notable for the following components:   Lactic Acid 3.4 (*)   All other components within normal limits   CBC WITH AUTO DIFFERENTIAL - Abnormal; Notable for the following components:   RBC 2.95 (*)   Hemoglobin 8.3 (*)   Hematocrit 26.5

## 2024-08-07 ENCOUNTER — TELEPHONE (OUTPATIENT)
Dept: NEUROLOGY | Facility: CLINIC | Age: 54
End: 2024-08-07
Payer: OTHER GOVERNMENT

## 2024-08-30 ENCOUNTER — OFFICE VISIT (OUTPATIENT)
Dept: NEUROLOGY | Facility: CLINIC | Age: 54
End: 2024-08-30
Payer: OTHER GOVERNMENT

## 2024-08-30 VITALS
SYSTOLIC BLOOD PRESSURE: 132 MMHG | HEIGHT: 66 IN | DIASTOLIC BLOOD PRESSURE: 32 MMHG | OXYGEN SATURATION: 98 % | BODY MASS INDEX: 31.98 KG/M2 | WEIGHT: 199 LBS | RESPIRATION RATE: 18 BRPM | HEART RATE: 83 BPM

## 2024-08-30 DIAGNOSIS — G43.709 CHRONIC MIGRAINE WITHOUT AURA WITHOUT STATUS MIGRAINOSUS, NOT INTRACTABLE: Primary | ICD-10-CM

## 2024-08-30 PROCEDURE — 99213 OFFICE O/P EST LOW 20 MIN: CPT | Mod: S$PBB,,, | Performed by: NURSE PRACTITIONER

## 2024-08-30 PROCEDURE — 99999 PR PBB SHADOW E&M-EST. PATIENT-LVL III: CPT | Mod: PBBFAC,,, | Performed by: NURSE PRACTITIONER

## 2024-08-30 PROCEDURE — 99213 OFFICE O/P EST LOW 20 MIN: CPT | Mod: PBBFAC | Performed by: NURSE PRACTITIONER

## 2024-08-30 RX ORDER — AMITRIPTYLINE HYDROCHLORIDE 25 MG/1
25 TABLET, FILM COATED ORAL NIGHTLY
Qty: 30 TABLET | Refills: 5 | Status: SHIPPED | OUTPATIENT
Start: 2024-08-30 | End: 2024-08-30

## 2024-08-30 RX ORDER — METOPROLOL TARTRATE 25 MG/1
25 TABLET, FILM COATED ORAL
COMMUNITY

## 2024-08-30 RX ORDER — LATANOPROST 50 UG/ML
SOLUTION/ DROPS OPHTHALMIC
COMMUNITY

## 2024-08-30 RX ORDER — AMITRIPTYLINE HYDROCHLORIDE 25 MG/1
25 TABLET, FILM COATED ORAL NIGHTLY
Qty: 30 TABLET | Refills: 5 | Status: SHIPPED | OUTPATIENT
Start: 2024-08-30 | End: 2025-08-30

## 2024-08-30 NOTE — PROGRESS NOTES
"    Subjective:       Patient ID: Talia Lawrence is a 54 y.o. female     Chief Complaint:    No chief complaint on file.       Allergies:  Lisinopril and Lyrica [pregabalin]    Current Medications:    Outpatient Encounter Medications as of 8/30/2024   Medication Sig Dispense Refill    albuterol (PROVENTIL/VENTOLIN HFA) 90 mcg/actuation inhaler Inhale 2 puffs into the lungs every 6 (six) hours as needed for Wheezing. Rescue 18 g 3    amitriptyline (ELAVIL) 25 MG tablet Take 1 tablet (25 mg total) by mouth every evening. 30 tablet 5    amLODIPine-benazepriL (LOTREL) 5-40 mg per capsule Take 1 capsule by mouth once daily. amlodipine 5 mg-benazepril 40 mg capsule   TAKE ONE CAPSULE BY MOUTH EVERY DAY    amlodipine 5 mg-benazepril 40 mg capsule   TAKE ONE CAPSULE BY MOUTH EVERY DAY 90 capsule 3    aspirin (ECOTRIN) 81 MG EC tablet Take 81 mg by mouth once daily.      atogepant (QULIPTA) 60 mg Tab Take 60 mg by mouth once daily. 90 tablet 3    atorvastatin (LIPITOR) 40 MG tablet Take 1 tablet (40 mg total) by mouth every evening. 90 tablet 3    BD ULTRA-FINE CARLINE PEN NEEDLE 32 gauge x 5/32" Ndle Use to inject insulin 4 times daily. 400 each 3    blood sugar diagnostic (BLOOD GLUCOSE TEST) Strp To check blood sugar 4 times daily 400 each 3    blood-glucose meter (TRUE METRIX GLUCOSE METER MISC) by Misc.(Non-Drug; Combo Route) route. Use as directed      blood-glucose sensor (FREESTYLE CARLOS 3 SENSOR) Deborah Use to monitor glucose and change every 14 days as directed. 6 each 1    brimonidine 0.2% (ALPHAGAN) 0.2 % Drop Place 1 drop into both eyes 2 (two) times daily.      cholecalciferol, vitamin D3, 125 mcg (5,000 unit) Tab Take 5,000 Units by mouth once daily.      clopidogreL (PLAVIX) 75 mg tablet Take 75 mg by mouth once daily.      dapagliflozin propanediol (FARXIGA ORAL) Take by mouth.      dorzolamide-timolol 2-0.5% (COSOPT) 22.3-6.8 mg/mL ophthalmic solution instill one drop in affected eye twice a day.      " flash glucose scanning reader (FREESTYLE CARLOS 2 READER) [facility] Use as directed. 1 each 0    fluticasone propionate (FLONASE) 50 mcg/actuation nasal spray 2 sprays by Each Nostril route once daily.      fluticasone-salmeterol diskus inhaler 100-50 mcg Inhale 1 puff into the lungs 2 (two) times daily. Controller 180 each 3    glucagon HCL (GLUCAGON, HCL, EMERGENCY KIT) 1 mg SolR Inject 1 each as directed as needed (hypoglycemia unsafe to swallow). Use as directed for hypoglycemia 1 each 1    insulin aspart U-100 (NOVOLOG FLEXPEN U-100 INSULIN) 100 unit/mL (3 mL) InPn pen Inject sq following sliding scale not to exceed 50 units daily 45 mL 3    insulin glargine, TOUJEO, (TOUJEO SOLOSTAR U-300 INSULIN) 300 unit/mL (1.5 mL) InPn pen Inject 46 Units into the skin once daily. 9 pen 1    isosorbide mononitrate (IMDUR) 60 MG 24 hr tablet Take 1 tablet (60 mg total) by mouth once daily. 90 tablet 1    lancets 31 gauge Misc 1 lancet  by Misc.(Non-Drug; Combo Route) route 4 (four) times daily. 400 each 3    latanoprost 0.005 % ophthalmic solution INSTILL ONE DROP IN EACH EYE AT BEDTIME Ophthalmic for 20      metoprolol succinate (TOPROL-XL) 25 MG 24 hr tablet Take 1 tablet (25 mg total) by mouth once daily. 90 tablet 1    metoprolol tartrate (LOPRESSOR) 25 MG tablet Take 25 mg by mouth.      montelukast (SINGULAIR) 10 mg tablet Take 10 mg by mouth.      nitroGLYCERIN (NITROSTAT) 0.4 MG SL tablet Place 1 tablet (0.4 mg total) under the tongue every 5 (five) minutes as needed for Chest pain. 25 tablet 3    pantoprazole (PROTONIX) 40 MG tablet Take 1 tablet (40 mg total) by mouth once daily. 90 tablet 3    promethazine (PHENERGAN) 25 MG tablet TAKE ONE TABLET BY MOUTH EVERY 8 HOURS AS NEEDED FOR MIGRAINE. DO NOT EXCEED MORE THAN 3 DAYS OF THE WEEK 20 tablet 1    [DISCONTINUED] gabapentin (NEURONTIN) 100 MG capsule Take 1 capsule twice daily for 3 days then increase to 2 capsules twice daily 120 capsule 11     No  facility-administered encounter medications on file as of 8/30/2024.       History of Present Illness  55 y/o female following in neurology for migraine headaches.    Prior treated with topamax but developed glaucoma and it was stopped.  Already taking metoprolol for HTN, an prior on pamelor without reported improvement.  Was on emgality once monthly injections through headache clinic in [place], felt was working good, but said she was on too many shots already and wanted to change to an oral option.  Thus I put her on Qulipta 60mg daily.  At her followup she reported about 8 headache days a month, which was half of what she was reporting at her initial visit with us.  Her insurance approved [facility] to use PRN so she essentially has CGRP medication for preventive and abortive treatment.  So she is getting very good medication coverage.      She was seen back early in July for reported 2 week long bilateral frontal migraine, atypical of her prior presentation and not helped with nurtec.  We did obtain labs not revealing, imaging including MRI brain and orbits was not revealing of any acute findings either.  I had started her with low dosing neurontin, tapering to 200mg bid but she reports unable to tolerate, due to drowsiness.  She is saying 15 or more days of the week.  She is taking tylenol likely more than 3 days of the week so there is possible component and I talked with her at length about avoiding all over the counter medications.    Will decrease to 1 tablet twice daily for a week then once daily for a week then stop.  I can try her with elavil 25mg at night, again, she is already on the metoprolol.  This is not clearly a migraine, her imaging is not abnormal, she denies any new meds, and denies overuse headaches.  I did offer to go back to injections, she declines.     She has CAD, followed by Dr. Rosado, so she is not able to use any triptan medications.    Prior sleep apnea workup was found  negative.    She reports follows with her ophthalmologist who is actually who ordered the MRI orbits that was negative.           Review of Systems  Review of Systems   Constitutional:  Negative for diaphoresis and fever.   HENT:  Negative for congestion, hearing loss and tinnitus.    Eyes:  Negative for blurred vision, double vision, photophobia, discharge and redness.   Respiratory:  Negative for cough and shortness of breath.    Cardiovascular:  Negative for chest pain.   Gastrointestinal:  Negative for abdominal pain, nausea and vomiting.   Musculoskeletal:  Negative for back pain, joint pain, myalgias and neck pain.   Skin:  Negative for itching and rash.   Neurological:  Positive for headaches. Negative for dizziness, tremors, sensory change, speech change, focal weakness, seizures, loss of consciousness and weakness.   Psychiatric/Behavioral:  Negative for depression, hallucinations and memory loss. The patient does not have insomnia.    All other systems reviewed and are negative.     Objective:     NEUROLOGICAL EXAMINATION:     MENTAL STATUS   Oriented to person, place, and time.   Attention: normal. Concentration: normal.   Speech: speech is normal   Level of consciousness: alert  Knowledge: good and consistent with education.   Normal comprehension.     CRANIAL NERVES     CN II   Visual fields full to confrontation.   Visual acuity: normal  Right visual field deficit: none  Left visual field deficit: none     CN III, IV, VI   Pupils are equal, round, and reactive to light.  Extraocular motions are normal.   Right pupil: Size: 3 mm. Shape: regular. Reactivity: brisk. Consensual response: intact. Accommodation: intact.   Left pupil: Size: 3 mm. Shape: regular. Reactivity: brisk. Consensual response: intact. Accommodation: intact.   CN III: no CN III palsy  CN VI: no CN VI palsy  Nystagmus: none   Diplopia: none  Upgaze: normal  Downgaze: normal  Conjugate gaze: present  Vestibulo-ocular reflex:  present    CN V   Facial sensation intact.   Right facial sensation deficit: none  Left facial sensation deficit: none  Right corneal reflex: normal  Left corneal reflex: normal    CN VII   Facial expression full, symmetric.   Right facial weakness: none  Left facial weakness: none  Right taste: normal  Left taste: normal    CN VIII   CN VIII normal.   Hearing: intact    CN IX, X   CN IX normal.   CN X normal.   Palate: symmetric    CN XI   CN XI normal.   Right sternocleidomastoid strength: normal  Left sternocleidomastoid strength: normal  Right trapezius strength: normal  Left trapezius strength: normal    CN XII   CN XII normal.   Tongue: not atrophic  Fasciculations: absent  Tongue deviation: none    MOTOR EXAM   Muscle bulk: normal  Overall muscle tone: normal  Right arm tone: normal  Left arm tone: normal  Right arm pronator drift: absent  Left arm pronator drift: absent  Right leg tone: normal  Left leg tone: normal    Strength   Right neck flexion: 5/5  Left neck flexion: 5/5  Right neck extension: 5/5  Left neck extension: 5/5  Right deltoid: 5/5  Left deltoid: 5/5  Right biceps: 5/5  Left biceps: 5/5  Right triceps: 5/5  Left triceps: 5/5  Right wrist flexion: 5/5  Left wrist flexion: 5/5  Right wrist extension: 5/5  Left wrist extension: 5/5  Right interossei: 5/5  Left interossei: 5/5  Right iliopsoas: 5/5  Left iliopsoas: 5/5  Right quadriceps: 5/5  Left quadriceps: 5/5  Right hamstrin/5  Left hamstrin/5  Right anterior tibial: 5/5  Left anterior tibial: 5/5  Right posterior tibial: 5/5  Left posterior tibial: 5/5  Right gastroc: 5/5  Left gastroc: 5/5    REFLEXES     Reflexes   Right brachioradialis: 2+  Left brachioradialis: 2+  Right biceps: 2+  Left biceps: 2+  Right triceps: 2+  Left triceps: 2+  Right patellar: 2+  Left patellar: 2+  Right achilles: 2+  Left achilles: 2+  Right plantar: normal  Left plantar: normal  Right Avilez: absent  Left Avilez: absent  Right ankle clonus:  absent  Left ankle clonus: absent  Right pendular knee jerk: absent  Left pendular knee jerk: absent    SENSORY EXAM   Light touch normal.   Right arm light touch: normal  Left arm light touch: normal  Right leg light touch: normal  Left leg light touch: normal  Vibration normal.   Right arm vibration: normal  Left arm vibration: normal  Right leg vibration: normal  Left leg vibration: normal  Proprioception normal.   Right arm proprioception: normal  Left arm proprioception: normal  Right leg proprioception: normal  Left leg proprioception: normal  Pinprick normal.   Right arm pinprick: normal  Left arm pinprick: normal  Right leg pinprick: normal  Left leg pinprick: normal  Graphesthesia: normal  Romberg: negative  Stereognosis: normal    GAIT AND COORDINATION     Gait  Gait: normal     Coordination   Finger to nose coordination: normal  Heel to shin coordination: normal  Tandem walking coordination: normal    Tremor   Resting tremor: absent  Intention tremor: absent  Action tremor: absent       Physical Exam  Vitals and nursing note reviewed.   Constitutional:       Appearance: Normal appearance.   HENT:      Head: Normocephalic.   Eyes:      Extraocular Movements: Extraocular movements intact and EOM normal.      Pupils: Pupils are equal, round, and reactive to light.   Cardiovascular:      Rate and Rhythm: Normal rate and regular rhythm.   Pulmonary:      Effort: Pulmonary effort is normal.      Breath sounds: Normal breath sounds.   Musculoskeletal:         General: No swelling or tenderness. Normal range of motion.      Cervical back: Normal range of motion and neck supple.      Right lower leg: No edema.      Left lower leg: No edema.   Skin:     General: Skin is warm and dry.      Coloration: Skin is not jaundiced.      Findings: No rash.   Neurological:      General: No focal deficit present.      Mental Status: She is alert and oriented to person, place, and time.      GCS: GCS eye subscore is 4. GCS  verbal subscore is 5. GCS motor subscore is 6.      Cranial Nerves: No cranial nerve deficit.      Sensory: No sensory deficit.      Motor: Motor function is intact. No weakness.      Coordination: Coordination is intact. Coordination normal. Finger-Nose-Finger Test, Heel to Shin Test and Romberg Test normal.      Gait: Gait is intact. Gait and tandem walk normal.      Deep Tendon Reflexes: Reflexes normal.      Reflex Scores:       Tricep reflexes are 2+ on the right side and 2+ on the left side.       Bicep reflexes are 2+ on the right side and 2+ on the left side.       Brachioradialis reflexes are 2+ on the right side and 2+ on the left side.       Patellar reflexes are 2+ on the right side and 2+ on the left side.       Achilles reflexes are 2+ on the right side and 2+ on the left side.  Psychiatric:         Mood and Affect: Mood normal.         Speech: Speech normal.         Behavior: Behavior normal.          Assessment:     Problem List Items Addressed This Visit          Neuro    Chronic migraine without aura - Primary    Relevant Medications    amitriptyline (ELAVIL) 25 MG tablet                  Primary Diagnosis and ICD10  Chronic migraine without aura without status migrainosus, not intractable [G43.709]    Plan:     There are no Patient Instructions on file for this visit.    Medications Discontinued During This Encounter   Medication Reason    gabapentin (NEURONTIN) 100 MG capsule            Requested Prescriptions     Signed Prescriptions Disp Refills    amitriptyline (ELAVIL) 25 MG tablet 30 tablet 5     Sig: Take 1 tablet (25 mg total) by mouth every evening.       No orders of the defined types were placed in this encounter.

## 2024-11-15 DIAGNOSIS — G43.709 CHRONIC MIGRAINE WITHOUT AURA WITHOUT STATUS MIGRAINOSUS, NOT INTRACTABLE: ICD-10-CM

## 2024-11-15 RX ORDER — ATOGEPANT 60 MG/1
1 TABLET ORAL
Qty: 90 TABLET | Refills: 3 | Status: SHIPPED | OUTPATIENT
Start: 2024-11-15

## 2024-11-15 RX ORDER — ATOGEPANT 60 MG/1
60 TABLET ORAL DAILY
Qty: 90 TABLET | Refills: 3 | Status: SHIPPED | OUTPATIENT
Start: 2024-11-15

## 2025-01-02 NOTE — PROGRESS NOTES
"    Subjective:       Patient ID: Talia Lawrence is a 54 y.o. female     Chief Complaint:    No chief complaint on file.       Allergies:  Lisinopril and Lyrica [pregabalin]    Current Medications:    Outpatient Encounter Medications as of 1/3/2025   Medication Sig Dispense Refill    albuterol (PROVENTIL/VENTOLIN HFA) 90 mcg/actuation inhaler Inhale 2 puffs into the lungs every 6 (six) hours as needed for Wheezing. Rescue 18 g 3    amLODIPine-benazepriL (LOTREL) 5-40 mg per capsule Take 1 capsule by mouth once daily. amlodipine 5 mg-benazepril 40 mg capsule   TAKE ONE CAPSULE BY MOUTH EVERY DAY    amlodipine 5 mg-benazepril 40 mg capsule   TAKE ONE CAPSULE BY MOUTH EVERY DAY 90 capsule 3    aspirin (ECOTRIN) 81 MG EC tablet Take 81 mg by mouth once daily.      atogepant (QULIPTA) 60 mg Tab Take 1 tablet (60 mg total) by mouth once daily. 90 tablet 3    BD ULTRA-FINE CARLINE PEN NEEDLE 32 gauge x 5/32" Ndle Use to inject insulin 4 times daily. 400 each 3    blood sugar diagnostic (BLOOD GLUCOSE TEST) Strp To check blood sugar 4 times daily 400 each 3    blood-glucose meter (TRUE METRIX GLUCOSE METER MISC) by Misc.(Non-Drug; Combo Route) route. Use as directed      blood-glucose sensor (DartPointsSTYLE CARLOS 3 SENSOR) Deborah Use to monitor glucose and change every 14 days as directed. 6 each 1    brimonidine 0.2% (ALPHAGAN) 0.2 % Drop Place 1 drop into both eyes 2 (two) times daily.      cholecalciferol, vitamin D3, 125 mcg (5,000 unit) Tab Take 5,000 Units by mouth once daily.      clopidogreL (PLAVIX) 75 mg tablet Take 75 mg by mouth once daily.      dapagliflozin propanediol (FARXIGA ORAL) Take by mouth.      dorzolamide-timolol 2-0.5% (COSOPT) 22.3-6.8 mg/mL ophthalmic solution instill one drop in affected eye twice a day.      ezetimibe (ZETIA) 10 mg tablet Take 10 mg by mouth.      flash glucose scanning reader (FREESTYLE CARLOS 2 READER) [facility] Use as directed. 1 each 0    fluticasone propionate (FLONASE) 50 " mcg/actuation nasal spray 2 sprays by Each Nostril route once daily.      glucagon HCL (GLUCAGON, HCL, EMERGENCY KIT) 1 mg SolR Inject 1 each as directed as needed (hypoglycemia unsafe to swallow). Use as directed for hypoglycemia 1 each 1    insulin aspart U-100 (NOVOLOG FLEXPEN U-100 INSULIN) 100 unit/mL (3 mL) InPn pen Inject sq following sliding scale not to exceed 50 units daily 45 mL 3    insulin glargine U-300 conc (TOUJEO MAX SOLOSTAR) 300 unit/mL (3 mL) insulin pen Inject 46 Units into the skin.      isosorbide mononitrate (IMDUR) 60 MG 24 hr tablet Take 1 tablet (60 mg total) by mouth once daily. 90 tablet 1    lancets 31 gauge Misc 1 lancet  by Misc.(Non-Drug; Combo Route) route 4 (four) times daily. 400 each 3    latanoprost 0.005 % ophthalmic solution INSTILL ONE DROP IN EACH EYE AT BEDTIME Ophthalmic for 20      metoprolol succinate (TOPROL-XL) 25 MG 24 hr tablet Take 1 tablet (25 mg total) by mouth once daily. 90 tablet 1    metoprolol tartrate (LOPRESSOR) 25 MG tablet Take 25 mg by mouth.      montelukast (SINGULAIR) 10 mg tablet Take 10 mg by mouth.      promethazine (PHENERGAN) 25 MG tablet TAKE ONE TABLET BY MOUTH EVERY 8 HOURS AS NEEDED FOR MIGRAINE. DO NOT EXCEED MORE THAN 3 DAYS OF THE WEEK 20 tablet 1    QULIPTA 60 mg Tab TAKE ONE TABLET BY MOUTH EVERY DAY 90 tablet 3    [DISCONTINUED] amitriptyline (ELAVIL) 25 MG tablet Take 1 tablet (25 mg total) by mouth every evening. 30 tablet 5    atorvastatin (LIPITOR) 40 MG tablet Take 1 tablet (40 mg total) by mouth every evening. 90 tablet 3    fluticasone-salmeterol diskus inhaler 100-50 mcg Inhale 1 puff into the lungs 2 (two) times daily. Controller 180 each 3    [] insulin glargine, TOUJEO, (TOUJEO SOLOSTAR U-300 INSULIN) 300 unit/mL (1.5 mL) InPn pen Inject 46 Units into the skin once daily. 9 pen 1    nitroGLYCERIN (NITROSTAT) 0.4 MG SL tablet Place 1 tablet (0.4 mg total) under the tongue every 5 (five) minutes as needed for Chest pain.  25 tablet 3    pantoprazole (PROTONIX) 40 MG tablet Take 1 tablet (40 mg total) by mouth once daily. 90 tablet 3    rimegepant (NURTEC) 75 mg odt Take 1 tablet (75 mg total) by mouth once as needed for Migraine. Place ODT tablet on the tongue; alternatively the ODT tablet may be placed under the tongue 16 tablet 5     No facility-administered encounter medications on file as of 1/3/2025.       History of Present Illness  53 y/o female following in neurology for migraine headaches.    Prior treated with topamax but developed glaucoma and it was stopped.  Already taking metoprolol for HTN, an prior on pamelor without reported improvement.  Was on emgality once monthly injections through headache clinic in [place], felt was working good, but said she was on too many shots already and wanted to change to an oral option.  Thus I put her on Qulipta 60mg daily.  At her followup she reported about 8 headache days a month, which was half of what she was reporting at her initial visit with us.  [facility] so she essentially has CGRP medication for preventive and abortive treatment.  So she is getting very good medication coverage.    She was seen back early in July for reported 2 week long bilateral frontal migraine, atypical of her prior presentation and not helped with nurte.  We did obtain labs not revealing, imaging including MRI brain and orbits was not revealing of any acute findings either.  Neurontin was tried without benefit and was not able to tolerate side effects, thus tapered her off of it and started elavil 25mg at night but she never started it.  She was actually found in early September to develop shingles rash and was being treated by Dr. Rai but ultimately she developed significant complications, ultimately had to be treated at [facility] in [place] with IV acyclovir but reports since then doing good.    She has CAD followed by Dr. Javier, this is why we do not use triptans  for her migraines.    Prior sleep apnea workup was found negative.           Review of Systems  Review of Systems   Constitutional:  Negative for diaphoresis and fever.   HENT:  Negative for congestion, hearing loss and tinnitus.    Eyes:  Negative for blurred vision, double vision, photophobia, discharge and redness.   Respiratory:  Negative for cough and shortness of breath.    Cardiovascular:  Negative for chest pain.   Gastrointestinal:  Negative for abdominal pain, nausea and vomiting.   Musculoskeletal:  Negative for back pain, joint pain, myalgias and neck pain.   Skin:  Negative for itching and rash.   Neurological:  Positive for headaches. Negative for dizziness, tremors, sensory change, speech change, focal weakness, seizures, loss of consciousness and weakness.   Psychiatric/Behavioral:  Negative for depression, hallucinations and memory loss. The patient does not have insomnia.    All other systems reviewed and are negative.     Objective:     NEUROLOGICAL EXAMINATION:     MENTAL STATUS   Oriented to person, place, and time.   Attention: normal. Concentration: normal.   Speech: speech is normal   Level of consciousness: alert  Knowledge: good and consistent with education.   Normal comprehension.     CRANIAL NERVES     CN II   Visual fields full to confrontation.   Visual acuity: normal  Right visual field deficit: none  Left visual field deficit: none     CN III, IV, VI   Pupils are equal, round, and reactive to light.  Extraocular motions are normal.   Right pupil: Size: 3 mm. Shape: regular. Reactivity: brisk. Consensual response: intact. Accommodation: intact.   Left pupil: Size: 3 mm. Shape: regular. Reactivity: brisk. Consensual response: intact. Accommodation: intact.   CN III: no CN III palsy  CN VI: no CN VI palsy  Nystagmus: none   Diplopia: none  Upgaze: normal  Downgaze: normal  Conjugate gaze: present  Vestibulo-ocular reflex: present    CN V   Facial sensation intact.   Right facial  sensation deficit: none  Left facial sensation deficit: none  Right corneal reflex: normal  Left corneal reflex: normal    CN VII   Facial expression full, symmetric.   Right facial weakness: none  Left facial weakness: none  Right taste: normal  Left taste: normal    CN VIII   CN VIII normal.   Hearing: intact    CN IX, X   CN IX normal.   CN X normal.   Palate: symmetric    CN XI   CN XI normal.   Right sternocleidomastoid strength: normal  Left sternocleidomastoid strength: normal  Right trapezius strength: normal  Left trapezius strength: normal    CN XII   CN XII normal.   Tongue: not atrophic  Fasciculations: absent  Tongue deviation: none    MOTOR EXAM   Muscle bulk: normal  Overall muscle tone: normal  Right arm tone: normal  Left arm tone: normal  Right arm pronator drift: absent  Left arm pronator drift: absent  Right leg tone: normal  Left leg tone: normal    Strength   Right neck flexion: 5/5  Left neck flexion: 5/5  Right neck extension: 5/5  Left neck extension: 5/5  Right deltoid: 5/5  Left deltoid: 5/5  Right biceps: 5/5  Left biceps: 5/5  Right triceps: 5/5  Left triceps: 5/5  Right wrist flexion: 5/5  Left wrist flexion: 5/5  Right wrist extension: 5/5  Left wrist extension: 5/5  Right interossei: 5/5  Left interossei: 5/5  Right iliopsoas: 5/5  Left iliopsoas: 5/5  Right quadriceps: 5/5  Left quadriceps: 5/5  Right hamstrin/5  Left hamstrin/5  Right anterior tibial: 5/5  Left anterior tibial: 5/5  Right posterior tibial: 5/5  Left posterior tibial: 5/5  Right gastroc: 5/5  Left gastroc: 5/5    REFLEXES     Reflexes   Right brachioradialis: 2+  Left brachioradialis: 2+  Right biceps: 2+  Left biceps: 2+  Right triceps: 2+  Left triceps: 2+  Right patellar: 2+  Left patellar: 2+  Right achilles: 2+  Left achilles: 2+  Right plantar: normal  Left plantar: normal  Right Avilez: absent  Left Avilez: absent  Right ankle clonus: absent  Left ankle clonus: absent  Right pendular knee jerk:  absent  Left pendular knee jerk: absent    SENSORY EXAM   Light touch normal.   Right arm light touch: normal  Left arm light touch: normal  Right leg light touch: normal  Left leg light touch: normal  Vibration normal.   Right arm vibration: normal  Left arm vibration: normal  Right leg vibration: normal  Left leg vibration: normal  Proprioception normal.   Right arm proprioception: normal  Left arm proprioception: normal  Right leg proprioception: normal  Left leg proprioception: normal  Pinprick normal.   Right arm pinprick: normal  Left arm pinprick: normal  Right leg pinprick: normal  Left leg pinprick: normal  Graphesthesia: normal  Romberg: negative  Stereognosis: normal    GAIT AND COORDINATION     Gait  Gait: normal     Coordination   Finger to nose coordination: normal  Heel to shin coordination: normal  Tandem walking coordination: normal    Tremor   Resting tremor: absent  Intention tremor: absent  Action tremor: absent       Physical Exam  Vitals and nursing note reviewed.   Constitutional:       Appearance: Normal appearance.   HENT:      Head: Normocephalic.   Eyes:      Extraocular Movements: Extraocular movements intact and EOM normal.      Pupils: Pupils are equal, round, and reactive to light.   Cardiovascular:      Rate and Rhythm: Normal rate and regular rhythm.   Pulmonary:      Effort: Pulmonary effort is normal.      Breath sounds: Normal breath sounds.   Musculoskeletal:         General: No swelling or tenderness. Normal range of motion.      Cervical back: Normal range of motion and neck supple.      Right lower leg: No edema.      Left lower leg: No edema.   Skin:     General: Skin is warm and dry.      Coloration: Skin is not jaundiced.      Findings: No rash.   Neurological:      General: No focal deficit present.      Mental Status: She is alert and oriented to person, place, and time.      GCS: GCS eye subscore is 4. GCS verbal subscore is 5. GCS motor subscore is 6.      Cranial  Nerves: No cranial nerve deficit.      Sensory: No sensory deficit.      Motor: Motor function is intact. No weakness.      Coordination: Coordination is intact. Coordination normal. Finger-Nose-Finger Test, Heel to Shin Test and Romberg Test normal.      Gait: Gait is intact. Gait and tandem walk normal.      Deep Tendon Reflexes: Reflexes normal.      Reflex Scores:       Tricep reflexes are 2+ on the right side and 2+ on the left side.       Bicep reflexes are 2+ on the right side and 2+ on the left side.       Brachioradialis reflexes are 2+ on the right side and 2+ on the left side.       Patellar reflexes are 2+ on the right side and 2+ on the left side.       Achilles reflexes are 2+ on the right side and 2+ on the left side.  Psychiatric:         Mood and Affect: Mood normal.         Speech: Speech normal.         Behavior: Behavior normal.          Assessment:     Problem List Items Addressed This Visit          Neuro    Chronic migraine without aura - Primary    Relevant Medications    rimegepant (NURTEC) 75 mg odt                    Primary Diagnosis and ICD10  Chronic migraine without aura without status migrainosus, not intractable [G43.709]    Plan:     Patient Instructions   Continue qulipta 60mg daily  Phenergan as needed, but not more than 2 days of the week  Continue nurtec as directed as needed      Medications Discontinued During This Encounter   Medication Reason    amitriptyline (ELAVIL) 25 MG tablet              Requested Prescriptions     Signed Prescriptions Disp Refills    rimegepant (NURTEC) 75 mg odt 16 tablet 5     Sig: Take 1 tablet (75 mg total) by mouth once as needed for Migraine. Place ODT tablet on the tongue; alternatively the ODT tablet may be placed under the tongue       No orders of the defined types were placed in this encounter.

## 2025-01-03 ENCOUNTER — OFFICE VISIT (OUTPATIENT)
Dept: NEUROLOGY | Facility: CLINIC | Age: 55
End: 2025-01-03
Payer: OTHER GOVERNMENT

## 2025-01-03 VITALS
OXYGEN SATURATION: 99 % | WEIGHT: 192.38 LBS | RESPIRATION RATE: 18 BRPM | BODY MASS INDEX: 30.92 KG/M2 | HEIGHT: 66 IN | SYSTOLIC BLOOD PRESSURE: 146 MMHG | DIASTOLIC BLOOD PRESSURE: 76 MMHG | HEART RATE: 70 BPM

## 2025-01-03 DIAGNOSIS — G43.709 CHRONIC MIGRAINE WITHOUT AURA WITHOUT STATUS MIGRAINOSUS, NOT INTRACTABLE: Primary | ICD-10-CM

## 2025-01-03 PROCEDURE — 99215 OFFICE O/P EST HI 40 MIN: CPT | Mod: PBBFAC | Performed by: NURSE PRACTITIONER

## 2025-01-03 PROCEDURE — 99213 OFFICE O/P EST LOW 20 MIN: CPT | Mod: S$PBB,,, | Performed by: NURSE PRACTITIONER

## 2025-01-03 PROCEDURE — 99999 PR PBB SHADOW E&M-EST. PATIENT-LVL V: CPT | Mod: PBBFAC,,, | Performed by: NURSE PRACTITIONER

## 2025-01-03 RX ORDER — INSULIN GLARGINE 300 [IU]/ML
46 INJECTION, SOLUTION SUBCUTANEOUS
COMMUNITY
Start: 2024-04-29

## 2025-01-03 RX ORDER — EZETIMIBE 10 MG/1
10 TABLET ORAL
COMMUNITY
Start: 2024-12-30

## 2025-01-03 RX ORDER — RIMEGEPANT SULFATE 75 MG/75MG
75 TABLET, ORALLY DISINTEGRATING ORAL ONCE AS NEEDED
Qty: 16 TABLET | Refills: 5 | Status: SHIPPED | OUTPATIENT
Start: 2025-01-03 | End: 2025-01-03

## 2025-04-27 ENCOUNTER — OFFICE VISIT (OUTPATIENT)
Dept: FAMILY MEDICINE | Facility: CLINIC | Age: 55
End: 2025-04-27
Payer: OTHER GOVERNMENT

## 2025-04-27 VITALS
TEMPERATURE: 98 F | SYSTOLIC BLOOD PRESSURE: 119 MMHG | BODY MASS INDEX: 32.3 KG/M2 | OXYGEN SATURATION: 97 % | HEART RATE: 67 BPM | HEIGHT: 66 IN | DIASTOLIC BLOOD PRESSURE: 76 MMHG | RESPIRATION RATE: 18 BRPM | WEIGHT: 201 LBS

## 2025-04-27 DIAGNOSIS — H61.21 IMPACTED CERUMEN OF RIGHT EAR: Primary | ICD-10-CM

## 2025-04-27 DIAGNOSIS — H60.91 OTITIS EXTERNA OF RIGHT EAR, UNSPECIFIED CHRONICITY, UNSPECIFIED TYPE: ICD-10-CM

## 2025-04-27 DIAGNOSIS — J32.9 SINUSITIS, UNSPECIFIED CHRONICITY, UNSPECIFIED LOCATION: ICD-10-CM

## 2025-04-27 PROCEDURE — 99051 MED SERV EVE/WKEND/HOLIDAY: CPT | Mod: ,,, | Performed by: FAMILY MEDICINE

## 2025-04-27 PROCEDURE — 69209 REMOVE IMPACTED EAR WAX UNI: CPT | Mod: RT,,, | Performed by: FAMILY MEDICINE

## 2025-04-27 PROCEDURE — 99213 OFFICE O/P EST LOW 20 MIN: CPT | Mod: 25,,, | Performed by: FAMILY MEDICINE

## 2025-04-27 RX ORDER — AMOXICILLIN AND CLAVULANATE POTASSIUM 875; 125 MG/1; MG/1
1 TABLET, FILM COATED ORAL 2 TIMES DAILY
Qty: 14 TABLET | Refills: 0 | Status: SHIPPED | OUTPATIENT
Start: 2025-04-27 | End: 2025-05-04

## 2025-04-27 RX ORDER — PREDNISONE 10 MG/1
10 TABLET ORAL DAILY
Qty: 3 TABLET | Refills: 0 | Status: SHIPPED | OUTPATIENT
Start: 2025-04-27 | End: 2025-04-30

## 2025-04-27 RX ORDER — NEOMYCIN SULFATE, POLYMYXIN B SULFATE AND HYDROCORTISONE 10; 3.5; 1 MG/ML; MG/ML; [USP'U]/ML
3 SUSPENSION/ DROPS AURICULAR (OTIC) 3 TIMES DAILY
Qty: 10 ML | Refills: 0 | Status: SHIPPED | OUTPATIENT
Start: 2025-04-27 | End: 2025-05-02

## 2025-04-27 NOTE — PROGRESS NOTES
Subjective:       Patient ID: Talia Lawrence is a 54 y.o. female.    Chief Complaint: Otalgia (Right ear. Bleeding started yesterday. ) and Sinus Problem    Otalgia   Associated symptoms include coughing and rhinorrhea. Pertinent negatives include no abdominal pain, diarrhea, ear discharge, headaches, hearing loss, neck pain, rash, sore throat or vomiting.   Sinus Problem  Associated symptoms include congestion, coughing, ear pain and sinus pressure. Pertinent negatives include no chills, diaphoresis, headaches, neck pain, shortness of breath, sneezing or sore throat.     Review of Systems   Constitutional:  Negative for activity change, appetite change, chills, diaphoresis, fatigue, fever and unexpected weight change.   HENT:  Positive for nasal congestion, ear pain, postnasal drip, rhinorrhea and sinus pressure/congestion. Negative for dental problem, drooling, ear discharge, facial swelling, hearing loss, mouth sores, nosebleeds, sneezing, sore throat, tinnitus, trouble swallowing, voice change and goiter.    Eyes:  Negative for photophobia, discharge, itching and visual disturbance.   Respiratory:  Positive for cough. Negative for apnea, choking, chest tightness, shortness of breath, wheezing and stridor.    Cardiovascular:  Negative for chest pain, palpitations, leg swelling and claudication.   Gastrointestinal:  Negative for abdominal distention, abdominal pain, anal bleeding, blood in stool, change in bowel habit, constipation, diarrhea, nausea and vomiting.   Endocrine: Negative for cold intolerance, heat intolerance, polydipsia, polyphagia and polyuria.   Genitourinary:  Negative for bladder incontinence, decreased urine volume, difficulty urinating, dysuria, enuresis, flank pain, frequency, hematuria, nocturia, pelvic pain and urgency.   Musculoskeletal:  Negative for arthralgias, back pain, gait problem, joint swelling, leg pain, myalgias, neck pain, neck stiffness and joint deformity.    Integumentary:  Negative for pallor, rash, wound, breast mass and breast tenderness.   Allergic/Immunologic: Negative for environmental allergies, food allergies and immunocompromised state.   Neurological:  Negative for dizziness, vertigo, tremors, seizures, syncope, facial asymmetry, speech difficulty, weakness, light-headedness, numbness, headaches, coordination difficulties and memory loss.   Hematological:  Negative for adenopathy. Does not bruise/bleed easily.   Psychiatric/Behavioral:  Negative for agitation, behavioral problems, confusion, decreased concentration, dysphoric mood, hallucinations, self-injury, sleep disturbance and suicidal ideas. The patient is not nervous/anxious and is not hyperactive.    Breast: Negative for mass and tenderness        Objective:      Physical Exam  Vitals reviewed.   Constitutional:       Appearance: Normal appearance.   HENT:      Head: Normocephalic and atraumatic.      Right Ear: Tympanic membrane and external ear normal. There is impacted cerumen.      Left Ear: Tympanic membrane, ear canal and external ear normal.      Nose: Congestion and rhinorrhea present.      Mouth/Throat:      Mouth: Mucous membranes are moist.      Pharynx: Oropharynx is clear. Posterior oropharyngeal erythema present.   Eyes:      Extraocular Movements: Extraocular movements intact.      Conjunctiva/sclera: Conjunctivae normal.      Pupils: Pupils are equal, round, and reactive to light.   Cardiovascular:      Rate and Rhythm: Normal rate and regular rhythm.      Pulses: Normal pulses.      Heart sounds: Normal heart sounds.   Pulmonary:      Effort: Pulmonary effort is normal.      Breath sounds: Normal breath sounds.   Abdominal:      General: Bowel sounds are normal.      Palpations: Abdomen is soft.   Musculoskeletal:         General: Normal range of motion.      Cervical back: Normal range of motion and neck supple.   Skin:     General: Skin is warm and dry.   Neurological:       General: No focal deficit present.      Mental Status: She is alert. Mental status is at baseline.   Psychiatric:         Mood and Affect: Mood normal.         Behavior: Behavior normal.         Thought Content: Thought content normal.         Judgment: Judgment normal.         Assessment:       1. Impacted cerumen of right ear    2. Otitis externa of right ear, unspecified chronicity, unspecified type    3. Sinusitis, unspecified chronicity, unspecified location        Plan:     Impacted cerumen of right ear    Otitis externa of right ear, unspecified chronicity, unspecified type  -     neomycin-polymyxin-hydrocortisone (CORTISPORIN) 3.5-10,000-1 mg/mL-unit/mL-% otic suspension; Place 3 drops into the right ear 3 (three) times daily. for 5 days  Dispense: 10 mL; Refill: 0    Sinusitis, unspecified chronicity, unspecified location    Other orders  -     amoxicillin-clavulanate 875-125mg (AUGMENTIN) 875-125 mg per tablet; Take 1 tablet by mouth 2 (two) times a day. for 7 days  Dispense: 14 tablet; Refill: 0  -     predniSONE (DELTASONE) 10 MG tablet; Take 1 tablet (10 mg total) by mouth once daily. for 3 days  Dispense: 3 tablet; Refill: 0       Procedure Note: patient placed in seated position, water used to irrigate right ear canal, wax exuded from canal, patient tolerated well.

## 2025-07-01 ENCOUNTER — OFFICE VISIT (OUTPATIENT)
Dept: NEUROLOGY | Facility: CLINIC | Age: 55
End: 2025-07-01
Payer: OTHER GOVERNMENT

## 2025-07-01 VITALS
HEIGHT: 66 IN | WEIGHT: 199.63 LBS | BODY MASS INDEX: 32.08 KG/M2 | HEART RATE: 75 BPM | DIASTOLIC BLOOD PRESSURE: 62 MMHG | SYSTOLIC BLOOD PRESSURE: 110 MMHG | OXYGEN SATURATION: 99 %

## 2025-07-01 DIAGNOSIS — R11.0 NAUSEA: ICD-10-CM

## 2025-07-01 DIAGNOSIS — G43.709 CHRONIC MIGRAINE WITHOUT AURA WITHOUT STATUS MIGRAINOSUS, NOT INTRACTABLE: Primary | ICD-10-CM

## 2025-07-01 PROCEDURE — 99999 PR PBB SHADOW E&M-EST. PATIENT-LVL III: CPT | Mod: PBBFAC,,, | Performed by: NURSE PRACTITIONER

## 2025-07-01 PROCEDURE — 99213 OFFICE O/P EST LOW 20 MIN: CPT | Mod: PBBFAC | Performed by: NURSE PRACTITIONER

## 2025-07-01 PROCEDURE — 99213 OFFICE O/P EST LOW 20 MIN: CPT | Mod: S$PBB,,, | Performed by: NURSE PRACTITIONER

## 2025-07-01 RX ORDER — RIMEGEPANT SULFATE 75 MG/75MG
75 TABLET, ORALLY DISINTEGRATING ORAL
COMMUNITY

## 2025-07-01 RX ORDER — PROMETHAZINE HYDROCHLORIDE 25 MG/1
TABLET ORAL
Qty: 20 TABLET | Refills: 1 | Status: SHIPPED | OUTPATIENT
Start: 2025-07-01

## 2025-07-01 NOTE — PROGRESS NOTES
"    Subjective:       Patient ID: Talia Lawrence is a 55 y.o. female     Chief Complaint:    Chief Complaint   Patient presents with    Follow-up        Allergies:  Lisinopril and Lyrica [pregabalin]    Current Medications:    Outpatient Encounter Medications as of 7/1/2025   Medication Sig Dispense Refill    albuterol (PROVENTIL/VENTOLIN HFA) 90 mcg/actuation inhaler Inhale 2 puffs into the lungs every 6 (six) hours as needed for Wheezing. Rescue 18 g 3    amLODIPine-benazepriL (LOTREL) 5-40 mg per capsule Take 1 capsule by mouth once daily. amlodipine 5 mg-benazepril 40 mg capsule   TAKE ONE CAPSULE BY MOUTH EVERY DAY    amlodipine 5 mg-benazepril 40 mg capsule   TAKE ONE CAPSULE BY MOUTH EVERY DAY 90 capsule 3    aspirin (ECOTRIN) 81 MG EC tablet Take 81 mg by mouth once daily.      atogepant (QULIPTA) 60 mg Tab Take 1 tablet (60 mg total) by mouth once daily. 90 tablet 3    BD ULTRA-FINE CARLINE PEN NEEDLE 32 gauge x 5/32" Ndle Use to inject insulin 4 times daily. 400 each 3    blood sugar diagnostic (BLOOD GLUCOSE TEST) Strp To check blood sugar 4 times daily 400 each 3    blood-glucose meter (TRUE METRIX GLUCOSE METER MISC) by Misc.(Non-Drug; Combo Route) route. Use as directed      blood-glucose sensor (FREESTYLE CARLOS 3 SENSOR) Deborah Use to monitor glucose and change every 14 days as directed. 6 each 1    brimonidine 0.2% (ALPHAGAN) 0.2 % Drop Place 1 drop into both eyes 2 (two) times daily.      cholecalciferol, vitamin D3, 125 mcg (5,000 unit) Tab Take 5,000 Units by mouth once daily.      clopidogreL (PLAVIX) 75 mg tablet Take 75 mg by mouth once daily.      dapagliflozin propanediol (FARXIGA ORAL) Take by mouth.      dorzolamide-timolol 2-0.5% (COSOPT) 22.3-6.8 mg/mL ophthalmic solution instill one drop in affected eye twice a day.      ezetimibe (ZETIA) 10 mg tablet Take 10 mg by mouth.      fluticasone propionate (FLONASE) 50 mcg/actuation nasal spray 2 sprays by Each Nostril route once daily.      " isosorbide mononitrate (IMDUR) 60 MG 24 hr tablet Take 1 tablet (60 mg total) by mouth once daily. 90 tablet 1    metoprolol succinate (TOPROL-XL) 25 MG 24 hr tablet Take 1 tablet (25 mg total) by mouth once daily. 90 tablet 1    QULIPTA 60 mg Tab TAKE ONE TABLET BY MOUTH EVERY DAY 90 tablet 3    [DISCONTINUED] promethazine (PHENERGAN) 25 MG tablet TAKE ONE TABLET BY MOUTH EVERY 8 HOURS AS NEEDED FOR MIGRAINE. DO NOT EXCEED MORE THAN 3 DAYS OF THE WEEK 20 tablet 1    atorvastatin (LIPITOR) 40 MG tablet Take 1 tablet (40 mg total) by mouth every evening. 90 tablet 3    flash glucose scanning reader ([employer] CARLOS 2 READER) [facility] Use as directed. (Patient not taking: Reported on 7/1/2025) 1 each 0    fluticasone-salmeterol diskus inhaler 100-50 mcg Inhale 1 puff into the lungs 2 (two) times daily. Controller 180 each 3    glucagon HCL (GLUCAGON, HCL, EMERGENCY KIT) 1 mg SolR Inject 1 each as directed as needed (hypoglycemia unsafe to swallow). Use as directed for hypoglycemia (Patient not taking: Reported on 7/1/2025) 1 each 1    insulin aspart U-100 (NOVOLOG FLEXPEN U-100 INSULIN) 100 unit/mL (3 mL) InPn pen Inject sq following sliding scale not to exceed 50 units daily (Patient not taking: Reported on 7/1/2025) 45 mL 3    insulin glargine U-300 conc (TOUJEO MAX SOLOSTAR) 300 unit/mL (3 mL) insulin pen Inject 46 Units into the skin. (Patient not taking: Reported on 7/1/2025)      lancets 31 gauge Misc 1 lancet  by Misc.(Non-Drug; Combo Route) route 4 (four) times daily. (Patient not taking: Reported on 7/1/2025) 400 each 3    nitroGLYCERIN (NITROSTAT) 0.4 MG SL tablet Place 1 tablet (0.4 mg total) under the tongue every 5 (five) minutes as needed for Chest pain. 25 tablet 3    pantoprazole (PROTONIX) 40 MG tablet Take 1 tablet (40 mg total) by mouth once daily. 90 tablet 3    promethazine (PHENERGAN) 25 MG tablet TAKE ONE TABLET BY MOUTH EVERY 8 HOURS AS NEEDED FOR MIGRAINE. DO NOT EXCEED MORE THAN 3 DAYS OF THE  WEEK 20 tablet 1    rimegepant (NURTEC) 75 mg odt Take 75 mg by mouth.      [DISCONTINUED] latanoprost 0.005 % ophthalmic solution INSTILL ONE DROP IN EACH EYE AT BEDTIME Ophthalmic for 20 (Patient not taking: Reported on 7/1/2025)      [DISCONTINUED] metoprolol tartrate (LOPRESSOR) 25 MG tablet Take 25 mg by mouth. (Patient not taking: Reported on 7/1/2025)      [DISCONTINUED] montelukast (SINGULAIR) 10 mg tablet Take 10 mg by mouth. (Patient not taking: Reported on 7/1/2025)       No facility-administered encounter medications on file as of 7/1/2025.       History of Present Illness  56 y/o female following in neurology for migraine headaches.    Prior treated with topamax but developed glaucoma and it was stopped.  Already taking metoprolol for HTN, an prior on pamelor without reported improvement.  Was on emgality once monthly injections through headache clinic in [place], felt was working good, but said she was on too many shots already and wanted to change to an oral option.  Thus I put her on Qulipta 60mg daily.  At her followup she reported about 8 headache days a month, which was half of what she was reporting at her initial visit with us.  Her insurance approved nurtec to use PRN so she essentially has CGRP medication for preventive and abortive treatment.  So she is getting very good medication coverage.    She has CAD followed by Dr. Javier, this is why we do not use triptans for her migraines.    Prior sleep apnea workup was found negative.           Review of Systems  Review of Systems   Constitutional:  Negative for diaphoresis and fever.   HENT:  Negative for congestion, hearing loss and tinnitus.    Eyes:  Negative for blurred vision, double vision, photophobia, discharge and redness.   Respiratory:  Negative for cough and shortness of breath.    Cardiovascular:  Negative for chest pain.   Gastrointestinal:  Negative for abdominal pain, nausea and vomiting.   Musculoskeletal:  Negative for back  pain, joint pain, myalgias and neck pain.   Skin:  Negative for itching and rash.   Neurological:  Positive for headaches. Negative for dizziness, tremors, sensory change, speech change, focal weakness, seizures, loss of consciousness and weakness.   Psychiatric/Behavioral:  Negative for depression, hallucinations and memory loss. The patient does not have insomnia.    All other systems reviewed and are negative.     Objective:     NEUROLOGICAL EXAMINATION:     MENTAL STATUS   Oriented to person, place, and time.   Attention: normal. Concentration: normal.   Speech: speech is normal   Level of consciousness: alert  Knowledge: good and consistent with education.   Normal comprehension.     CRANIAL NERVES     CN II   Visual fields full to confrontation.   Visual acuity: normal  Right visual field deficit: none  Left visual field deficit: none     CN III, IV, VI   Pupils are equal, round, and reactive to light.  Extraocular motions are normal.   Right pupil: Size: 3 mm. Shape: regular. Reactivity: brisk. Consensual response: intact. Accommodation: intact.   Left pupil: Size: 3 mm. Shape: regular. Reactivity: brisk. Consensual response: intact. Accommodation: intact.   CN III: no CN III palsy  CN VI: no CN VI palsy  Nystagmus: none   Diplopia: none  Upgaze: normal  Downgaze: normal  Conjugate gaze: present  Vestibulo-ocular reflex: present    CN V   Facial sensation intact.   Right facial sensation deficit: none  Left facial sensation deficit: none  Right corneal reflex: normal  Left corneal reflex: normal    CN VII   Facial expression full, symmetric.   Right facial weakness: none  Left facial weakness: none  Right taste: normal  Left taste: normal    CN VIII   CN VIII normal.   Hearing: intact    CN IX, X   CN IX normal.   CN X normal.   Palate: symmetric    CN XI   CN XI normal.   Right sternocleidomastoid strength: normal  Left sternocleidomastoid strength: normal  Right trapezius strength: normal  Left trapezius  strength: normal    CN XII   CN XII normal.   Tongue: not atrophic  Fasciculations: absent  Tongue deviation: none    MOTOR EXAM   Muscle bulk: normal  Overall muscle tone: normal  Right arm tone: normal  Left arm tone: normal  Right arm pronator drift: absent  Left arm pronator drift: absent  Right leg tone: normal  Left leg tone: normal    Strength   Right neck flexion: 5/5  Left neck flexion: 5/5  Right neck extension: 5/5  Left neck extension: 5/5  Right deltoid: 5/5  Left deltoid: 5/5  Right biceps: 5/5  Left biceps: 5/5  Right triceps: 5/5  Left triceps: 5/5  Right wrist flexion: 5/5  Left wrist flexion: 5/5  Right wrist extension: 5/5  Left wrist extension: 5/5  Right interossei: 5/5  Left interossei: 5/5  Right iliopsoas: 5/5  Left iliopsoas: 5/5  Right quadriceps: 5/5  Left quadriceps: 5/5  Right hamstrin/5  Left hamstrin/5  Right anterior tibial: /5  Left anterior tibial: /5  Right posterior tibial: 5/5  Left posterior tibial: 5/5  Right gastroc: 5/5  Left gastroc: 5/5    REFLEXES     Reflexes   Right brachioradialis: 2+  Left brachioradialis: 2+  Right biceps: 2+  Left biceps: 2+  Right triceps: 2+  Left triceps: 2+  Right patellar: 2+  Left patellar: 2+  Right achilles: 2+  Left achilles: 2+  Right plantar: normal  Left plantar: normal  Right Avilez: absent  Left Avilez: absent  Right ankle clonus: absent  Left ankle clonus: absent  Right pendular knee jerk: absent  Left pendular knee jerk: absent    SENSORY EXAM   Light touch normal.   Right arm light touch: normal  Left arm light touch: normal  Right leg light touch: normal  Left leg light touch: normal  Vibration normal.   Right arm vibration: normal  Left arm vibration: normal  Right leg vibration: normal  Left leg vibration: normal  Proprioception normal.   Right arm proprioception: normal  Left arm proprioception: normal  Right leg proprioception: normal  Left leg proprioception: normal  Pinprick normal.   Right arm pinprick:  normal  Left arm pinprick: normal  Right leg pinprick: normal  Left leg pinprick: normal  Graphesthesia: normal  Romberg: negative  Stereognosis: normal    GAIT AND COORDINATION     Gait  Gait: normal     Coordination   Finger to nose coordination: normal  Heel to shin coordination: normal  Tandem walking coordination: normal    Tremor   Resting tremor: absent  Intention tremor: absent  Action tremor: absent       Physical Exam  Vitals and nursing note reviewed.   Constitutional:       Appearance: Normal appearance.   HENT:      Head: Normocephalic.   Eyes:      Extraocular Movements: Extraocular movements intact and EOM normal.      Pupils: Pupils are equal, round, and reactive to light.   Cardiovascular:      Rate and Rhythm: Normal rate and regular rhythm.   Pulmonary:      Effort: Pulmonary effort is normal.      Breath sounds: Normal breath sounds.   Musculoskeletal:         General: No swelling or tenderness. Normal range of motion.      Cervical back: Normal range of motion and neck supple.      Right lower leg: No edema.      Left lower leg: No edema.   Skin:     General: Skin is warm and dry.      Coloration: Skin is not jaundiced.      Findings: No rash.   Neurological:      General: No focal deficit present.      Mental Status: She is alert and oriented to person, place, and time.      GCS: GCS eye subscore is 4. GCS verbal subscore is 5. GCS motor subscore is 6.      Cranial Nerves: No cranial nerve deficit.      Sensory: No sensory deficit.      Motor: Motor function is intact. No weakness.      Coordination: Coordination is intact. Coordination normal. Finger-Nose-Finger Test, Heel to Shin Test and Romberg Test normal.      Gait: Gait is intact. Gait and tandem walk normal.      Deep Tendon Reflexes: Reflexes normal.      Reflex Scores:       Tricep reflexes are 2+ on the right side and 2+ on the left side.       Bicep reflexes are 2+ on the right side and 2+ on the left side.       Brachioradialis  reflexes are 2+ on the right side and 2+ on the left side.       Patellar reflexes are 2+ on the right side and 2+ on the left side.       Achilles reflexes are 2+ on the right side and 2+ on the left side.  Psychiatric:         Mood and Affect: Mood normal.         Speech: Speech normal.         Behavior: Behavior normal.          Assessment:     Problem List Items Addressed This Visit          Neuro    Chronic migraine without aura - Primary     Other Visit Diagnoses         Nausea        Relevant Medications    promethazine (PHENERGAN) 25 MG tablet                           Primary Diagnosis and ICD10  Chronic migraine without aura without status migrainosus, not intractable [G43.709]    Plan:     There are no Patient Instructions on file for this visit.    Medications Discontinued During This Encounter   Medication Reason    promethazine (PHENERGAN) 25 MG tablet Reorder    montelukast (SINGULAIR) 10 mg tablet     metoprolol tartrate (LOPRESSOR) 25 MG tablet     latanoprost 0.005 % ophthalmic solution                Requested Prescriptions     Signed Prescriptions Disp Refills    promethazine (PHENERGAN) 25 MG tablet 20 tablet 1     Sig: TAKE ONE TABLET BY MOUTH EVERY 8 HOURS AS NEEDED FOR MIGRAINE. DO NOT EXCEED MORE THAN 3 DAYS OF THE WEEK       No orders of the defined types were placed in this encounter.

## (undated) DEVICE — TOWEL OR DISP STRL BLUE 4/PK

## (undated) DEVICE — GLOVE SURG BIOGEL LATEX SZ 7.5

## (undated) DEVICE — GLOVE SURGICAL PROTEXIS PI SIZE 6

## (undated) DEVICE — NDL PERCUTANEOUS ENTRYBSDN 18

## (undated) DEVICE — DEVICE MYNX GRIP 6/7F

## (undated) DEVICE — BALL COTTON STERILE ENT

## (undated) DEVICE — INTRODUCER CATH 6F 11CM

## (undated) DEVICE — CHLORAPREP 10.5 ML APPLICATOR

## (undated) DEVICE — GLOVE PROTEXIS PI CRM 6

## (undated) DEVICE — DECANTER FLUID TRNSF WHITE 9IN

## (undated) DEVICE — PROTECTOR ULNAR NERVE FOAM

## (undated) DEVICE — Device

## (undated) DEVICE — OXISENSOR ADULT DIGIT N/S

## (undated) DEVICE — DRESSING TRANS 4X4 TEGADERM

## (undated) DEVICE — LINE MCRSTRM NONINTUB ST ETC02

## (undated) DEVICE — SET EXTENSION CLEARLINK 2INJ

## (undated) DEVICE — KIT HEART ANGIO MFLD LEFT RUSH

## (undated) DEVICE — BLADE MYRINGOTOMY 7120

## (undated) DEVICE — CATH IMPULSE 6FR MULTI-PAK

## (undated) DEVICE — TUBING SUCTION 5MM STERILE 3/16IN X 12FT

## (undated) DEVICE — CONTRAST ISOVUE 370 100ML

## (undated) DEVICE — SOL IRR SOD CHL .9% POUR

## (undated) DEVICE — SYR MED RAD 150ML

## (undated) DEVICE — GLOVE SURGICAL PROTEXIS PI SIZE 7.5

## (undated) DEVICE — SET IV PRIMARY

## (undated) DEVICE — CLIPPER BLADE MOD 4406 (CAREF)